# Patient Record
Sex: FEMALE | Race: WHITE | Employment: UNEMPLOYED | URBAN - NONMETROPOLITAN AREA
[De-identification: names, ages, dates, MRNs, and addresses within clinical notes are randomized per-mention and may not be internally consistent; named-entity substitution may affect disease eponyms.]

---

## 2017-01-03 ENCOUNTER — NURSE ONLY (OUTPATIENT)
Dept: FAMILY MEDICINE CLINIC | Age: 42
End: 2017-01-03
Payer: MEDICAID

## 2017-01-03 DIAGNOSIS — E53.8 B12 DEFICIENCY: Primary | ICD-10-CM

## 2017-01-03 PROCEDURE — 96372 THER/PROPH/DIAG INJ SC/IM: CPT | Performed by: FAMILY MEDICINE

## 2017-01-03 RX ORDER — CYANOCOBALAMIN 1000 UG/ML
1000 INJECTION INTRAMUSCULAR; SUBCUTANEOUS ONCE
Status: COMPLETED | OUTPATIENT
Start: 2017-01-03 | End: 2017-01-03

## 2017-01-03 RX ADMIN — CYANOCOBALAMIN 1000 MCG: 1000 INJECTION INTRAMUSCULAR; SUBCUTANEOUS at 09:21

## 2017-01-10 ENCOUNTER — OFFICE VISIT (OUTPATIENT)
Dept: FAMILY MEDICINE CLINIC | Age: 42
End: 2017-01-10
Payer: MEDICAID

## 2017-01-10 VITALS
DIASTOLIC BLOOD PRESSURE: 70 MMHG | SYSTOLIC BLOOD PRESSURE: 108 MMHG | RESPIRATION RATE: 18 BRPM | OXYGEN SATURATION: 98 % | BODY MASS INDEX: 37.89 KG/M2 | HEIGHT: 60 IN | WEIGHT: 193 LBS | HEART RATE: 74 BPM

## 2017-01-10 DIAGNOSIS — M72.2 PLANTAR FASCIITIS, RIGHT: Primary | ICD-10-CM

## 2017-01-10 DIAGNOSIS — B37.2 YEAST DERMATITIS: ICD-10-CM

## 2017-01-10 PROCEDURE — 99214 OFFICE O/P EST MOD 30 MIN: CPT | Performed by: FAMILY MEDICINE

## 2017-01-10 RX ORDER — MONTELUKAST SODIUM 10 MG/1
TABLET ORAL
Qty: 30 TABLET | Refills: 5 | Status: SHIPPED | OUTPATIENT
Start: 2017-01-10 | End: 2017-06-19 | Stop reason: SDUPTHER

## 2017-01-10 RX ORDER — PHENTERMINE HYDROCHLORIDE 37.5 MG/1
37.5 CAPSULE ORAL EVERY MORNING
Qty: 30 CAPSULE | Refills: 2 | Status: SHIPPED | OUTPATIENT
Start: 2017-01-10 | End: 2017-02-09

## 2017-01-10 RX ORDER — LEVOTHYROXINE SODIUM 88 UG/1
88 TABLET ORAL DAILY
Qty: 30 TABLET | Refills: 5 | Status: SHIPPED | OUTPATIENT
Start: 2017-01-10 | End: 2017-06-19 | Stop reason: SDUPTHER

## 2017-01-10 RX ORDER — CETIRIZINE HYDROCHLORIDE 10 MG/1
TABLET ORAL
Qty: 30 TABLET | Refills: 5 | Status: SHIPPED | OUTPATIENT
Start: 2017-01-10 | End: 2017-06-19 | Stop reason: SDUPTHER

## 2017-01-10 RX ORDER — NYSTATIN 100000 U/G
CREAM TOPICAL
Qty: 1 TUBE | Refills: 2 | Status: SHIPPED | OUTPATIENT
Start: 2017-01-10 | End: 2017-04-10 | Stop reason: SDUPTHER

## 2017-01-10 ASSESSMENT — ENCOUNTER SYMPTOMS: VOICE CHANGE: 1

## 2017-03-03 ENCOUNTER — LAB REQUISITION (OUTPATIENT)
Dept: LAB | Facility: HOSPITAL | Age: 42
End: 2017-03-03

## 2017-03-03 DIAGNOSIS — R13.10 DYSPHAGIA: ICD-10-CM

## 2017-03-03 PROCEDURE — 88305 TISSUE EXAM BY PATHOLOGIST: CPT | Performed by: INTERNAL MEDICINE

## 2017-03-08 LAB
CYTO UR: NORMAL
LAB AP CASE REPORT: NORMAL
LAB AP CLINICAL INFORMATION: NORMAL
Lab: NORMAL
PATH REPORT.ADDENDUM SPEC: NORMAL
PATH REPORT.FINAL DX SPEC: NORMAL
PATH REPORT.GROSS SPEC: NORMAL

## 2017-03-13 RX ORDER — ERGOCALCIFEROL 1.25 MG/1
CAPSULE ORAL
Qty: 4 CAPSULE | Refills: 0 | Status: SHIPPED | OUTPATIENT
Start: 2017-03-13 | End: 2017-04-10 | Stop reason: SDUPTHER

## 2017-03-30 RX ORDER — FUROSEMIDE 20 MG/1
TABLET ORAL
Qty: 30 TABLET | Refills: 0 | Status: SHIPPED | OUTPATIENT
Start: 2017-03-30 | End: 2017-04-10 | Stop reason: SDUPTHER

## 2017-04-10 ENCOUNTER — OFFICE VISIT (OUTPATIENT)
Dept: FAMILY MEDICINE CLINIC | Age: 42
End: 2017-04-10
Payer: MEDICAID

## 2017-04-10 VITALS
HEART RATE: 96 BPM | DIASTOLIC BLOOD PRESSURE: 72 MMHG | BODY MASS INDEX: 38.48 KG/M2 | OXYGEN SATURATION: 99 % | SYSTOLIC BLOOD PRESSURE: 118 MMHG | RESPIRATION RATE: 18 BRPM | WEIGHT: 196 LBS | HEIGHT: 60 IN

## 2017-04-10 DIAGNOSIS — M25.561 ACUTE PAIN OF RIGHT KNEE: ICD-10-CM

## 2017-04-10 DIAGNOSIS — M54.42 CHRONIC BILATERAL LOW BACK PAIN WITH BILATERAL SCIATICA: ICD-10-CM

## 2017-04-10 DIAGNOSIS — L03.032 CELLULITIS OF TOE OF LEFT FOOT: Primary | ICD-10-CM

## 2017-04-10 DIAGNOSIS — G89.29 CHRONIC BILATERAL LOW BACK PAIN WITH BILATERAL SCIATICA: ICD-10-CM

## 2017-04-10 DIAGNOSIS — F32.A DEPRESSION, UNSPECIFIED DEPRESSION TYPE: ICD-10-CM

## 2017-04-10 DIAGNOSIS — R10.31 RIGHT LOWER QUADRANT PAIN: ICD-10-CM

## 2017-04-10 DIAGNOSIS — M54.41 CHRONIC BILATERAL LOW BACK PAIN WITH BILATERAL SCIATICA: ICD-10-CM

## 2017-04-10 DIAGNOSIS — E53.8 B12 DEFICIENCY: ICD-10-CM

## 2017-04-10 PROCEDURE — 96372 THER/PROPH/DIAG INJ SC/IM: CPT | Performed by: FAMILY MEDICINE

## 2017-04-10 PROCEDURE — 99214 OFFICE O/P EST MOD 30 MIN: CPT | Performed by: FAMILY MEDICINE

## 2017-04-10 RX ORDER — FUROSEMIDE 20 MG/1
TABLET ORAL
Qty: 30 TABLET | Refills: 2 | Status: SHIPPED | OUTPATIENT
Start: 2017-04-10 | End: 2017-06-19 | Stop reason: SDUPTHER

## 2017-04-10 RX ORDER — CEPHALEXIN 500 MG/1
500 CAPSULE ORAL 3 TIMES DAILY
Qty: 21 CAPSULE | Refills: 0 | Status: SHIPPED | OUTPATIENT
Start: 2017-04-10 | End: 2017-04-17

## 2017-04-10 RX ORDER — FAMOTIDINE 40 MG/1
1 TABLET, FILM COATED ORAL 2 TIMES DAILY
COMMUNITY
Start: 2017-03-29 | End: 2018-03-05 | Stop reason: SDUPTHER

## 2017-04-10 RX ORDER — CYANOCOBALAMIN 1000 UG/ML
1000 INJECTION INTRAMUSCULAR; SUBCUTANEOUS ONCE
Status: COMPLETED | OUTPATIENT
Start: 2017-04-10 | End: 2017-04-10

## 2017-04-10 RX ORDER — PHENTERMINE HYDROCHLORIDE 37.5 MG/1
37.5 CAPSULE ORAL EVERY MORNING
Qty: 30 CAPSULE | Refills: 2 | Status: CANCELLED | OUTPATIENT
Start: 2017-04-10 | End: 2017-05-10

## 2017-04-10 RX ORDER — LEVOCETIRIZINE DIHYDROCHLORIDE 5 MG/1
5 TABLET, FILM COATED ORAL DAILY
Qty: 30 TABLET | Refills: 5 | Status: SHIPPED | OUTPATIENT
Start: 2017-04-10 | End: 2017-12-29 | Stop reason: SDUPTHER

## 2017-04-10 RX ORDER — NAPROXEN 500 MG/1
500 TABLET ORAL 2 TIMES DAILY WITH MEALS
Qty: 60 TABLET | Refills: 5 | Status: SHIPPED | OUTPATIENT
Start: 2017-04-10 | End: 2017-06-19

## 2017-04-10 RX ORDER — FLUTICASONE PROPIONATE 50 MCG
SPRAY, SUSPENSION (ML) NASAL
Qty: 1 BOTTLE | Refills: 5 | Status: SHIPPED | OUTPATIENT
Start: 2017-04-10 | End: 2017-10-20 | Stop reason: SDUPTHER

## 2017-04-10 RX ORDER — ERGOCALCIFEROL 1.25 MG/1
CAPSULE ORAL
Qty: 4 CAPSULE | Refills: 2 | Status: SHIPPED | OUTPATIENT
Start: 2017-04-10 | End: 2017-06-19 | Stop reason: SDUPTHER

## 2017-04-10 RX ORDER — DEXLANSOPRAZOLE 60 MG/1
1 CAPSULE, DELAYED RELEASE ORAL DAILY
COMMUNITY
Start: 2017-04-03 | End: 2018-06-29 | Stop reason: SDUPTHER

## 2017-04-10 RX ORDER — NYSTATIN 100000 U/G
CREAM TOPICAL
Qty: 1 TUBE | Refills: 2 | Status: SHIPPED | OUTPATIENT
Start: 2017-04-10 | End: 2017-06-19 | Stop reason: SDUPTHER

## 2017-04-10 RX ADMIN — CYANOCOBALAMIN 1000 MCG: 1000 INJECTION INTRAMUSCULAR; SUBCUTANEOUS at 15:18

## 2017-04-10 ASSESSMENT — ENCOUNTER SYMPTOMS: VOICE CHANGE: 1

## 2017-04-21 ENCOUNTER — HOSPITAL ENCOUNTER (OUTPATIENT)
Dept: GENERAL RADIOLOGY | Age: 42
Discharge: HOME | End: 2017-04-21
Attending: FAMILY MEDICINE | Admitting: FAMILY MEDICINE

## 2017-04-21 DIAGNOSIS — R10.31 RIGHT LOWER QUADRANT PAIN: ICD-10-CM

## 2017-04-24 ENCOUNTER — OFFICE VISIT (OUTPATIENT)
Dept: FAMILY MEDICINE CLINIC | Age: 42
End: 2017-04-24
Payer: MEDICAID

## 2017-04-24 ENCOUNTER — HOSPITAL ENCOUNTER (OUTPATIENT)
Dept: OTHER | Age: 42
Discharge: HOME | End: 2017-04-24
Attending: FAMILY MEDICINE | Admitting: FAMILY MEDICINE

## 2017-04-24 VITALS
BODY MASS INDEX: 38.68 KG/M2 | DIASTOLIC BLOOD PRESSURE: 76 MMHG | SYSTOLIC BLOOD PRESSURE: 120 MMHG | HEART RATE: 73 BPM | HEIGHT: 60 IN | RESPIRATION RATE: 18 BRPM | OXYGEN SATURATION: 98 % | WEIGHT: 197 LBS

## 2017-04-24 DIAGNOSIS — R53.83 FATIGUE, UNSPECIFIED TYPE: ICD-10-CM

## 2017-04-24 DIAGNOSIS — E55.9 VITAMIN D DEFICIENCY: ICD-10-CM

## 2017-04-24 DIAGNOSIS — K21.9 GASTROESOPHAGEAL REFLUX DISEASE WITHOUT ESOPHAGITIS: ICD-10-CM

## 2017-04-24 DIAGNOSIS — R10.9 RIGHT LATERAL ABDOMINAL PAIN: Primary | ICD-10-CM

## 2017-04-24 DIAGNOSIS — K60.2 ANAL FISSURE: ICD-10-CM

## 2017-04-24 DIAGNOSIS — R63.5 WEIGHT GAIN: ICD-10-CM

## 2017-04-24 PROCEDURE — 99214 OFFICE O/P EST MOD 30 MIN: CPT | Performed by: FAMILY MEDICINE

## 2017-04-24 RX ORDER — PHENTERMINE HYDROCHLORIDE 37.5 MG/1
37.5 CAPSULE ORAL EVERY MORNING
Qty: 30 CAPSULE | Refills: 1 | Status: SHIPPED | OUTPATIENT
Start: 2017-04-24 | End: 2017-05-24

## 2017-04-24 ASSESSMENT — ENCOUNTER SYMPTOMS: VOICE CHANGE: 1

## 2017-04-25 LAB
A/G RATIO: 2 (ref 0.8–2)
ALBUMIN SERPL-MCNC: 4.5 G/DL (ref 3.4–4.8)
ALP BLD-CCNC: 95 U/L (ref 25–100)
ALT SERPL-CCNC: 8 U/L (ref 4–36)
ANION GAP SERPL CALCULATED.3IONS-SCNC: 10 MMOL/L (ref 3–16)
AST SERPL-CCNC: 11 U/L (ref 8–33)
BASOPHILS ABSOLUTE: 0 K/UL (ref 0–0.1)
BASOPHILS RELATIVE PERCENT: 0.4 %
BILIRUB SERPL-MCNC: <0.2 MG/DL (ref 0.3–1.2)
BUN BLDV-MCNC: 14 MG/DL (ref 6–20)
CALCIUM SERPL-MCNC: 9.3 MG/DL (ref 8.5–10.5)
CHLORIDE BLD-SCNC: 103 MMOL/L (ref 98–107)
CO2: 29 MMOL/L (ref 20–30)
CREAT SERPL-MCNC: 0.9 MG/DL (ref 0.4–1.2)
EOSINOPHILS ABSOLUTE: 0.2 K/UL (ref 0–0.4)
EOSINOPHILS RELATIVE PERCENT: 2.2 %
GFR AFRICAN AMERICAN: >59
GFR NON-AFRICAN AMERICAN: >60
GLOBULIN: 2.3 G/DL
GLUCOSE BLD-MCNC: 83 MG/DL (ref 74–106)
HCT VFR BLD CALC: 38.3 % (ref 37–47)
HEMOGLOBIN: 13.1 G/DL (ref 11.5–16.5)
LYMPHOCYTES ABSOLUTE: 3.2 K/UL (ref 1.5–4)
LYMPHOCYTES RELATIVE PERCENT: 34.8 % (ref 20–40)
MCH RBC QN AUTO: 30.7 PG (ref 27–32)
MCHC RBC AUTO-ENTMCNC: 34.2 G/DL (ref 31–35)
MCV RBC AUTO: 89.7 FL (ref 80–100)
MONOCYTES ABSOLUTE: 0.6 K/UL (ref 0.2–0.8)
MONOCYTES RELATIVE PERCENT: 6.7 % (ref 3–10)
NEUTROPHILS ABSOLUTE: 5 K/UL (ref 2–7.5)
NEUTROPHILS RELATIVE PERCENT: 55.9 %
PDW BLD-RTO: 13.7 % (ref 11–16)
PLATELET # BLD: 328 K/UL (ref 150–400)
PMV BLD AUTO: 10.9 FL (ref 6–10)
POTASSIUM SERPL-SCNC: 4 MMOL/L (ref 3.4–5.1)
RBC # BLD: 4.27 M/UL (ref 3.8–5.8)
SODIUM BLD-SCNC: 142 MMOL/L (ref 136–145)
T4 FREE: 1.74 NG/DL (ref 0.89–1.76)
TOTAL PROTEIN: 6.8 G/DL (ref 6.4–8.3)
TSH SERPL DL<=0.05 MIU/L-ACNC: 1.67 UIU/ML (ref 0.35–5.5)
VITAMIN D 25-HYDROXY: 21.1 (ref 32–100)
WBC # BLD: 9 K/UL (ref 4–11)

## 2017-05-02 ENCOUNTER — HOSPITAL ENCOUNTER (OUTPATIENT)
Dept: OTHER | Age: 42
Discharge: HOME | End: 2017-05-02
Attending: FAMILY MEDICINE | Admitting: FAMILY MEDICINE

## 2017-05-02 LAB
A/G RATIO: 1.7 (ref 0.8–2)
ALBUMIN SERPL-MCNC: 4.2 G/DL (ref 3.4–4.8)
ALP BLD-CCNC: 96 U/L (ref 25–100)
ALT SERPL-CCNC: 7 U/L (ref 4–36)
ANION GAP SERPL CALCULATED.3IONS-SCNC: 10 MMOL/L (ref 3–16)
APTT: 25.7 SEC (ref 23.2–28.2)
AST SERPL-CCNC: 11 U/L (ref 8–33)
BILIRUB SERPL-MCNC: 0.4 MG/DL (ref 0.3–1.2)
BUN BLDV-MCNC: 18 MG/DL (ref 6–20)
CALCIUM SERPL-MCNC: 9.5 MG/DL (ref 8.5–10.5)
CHLORIDE BLD-SCNC: 102 MMOL/L (ref 98–107)
CO2: 28 MMOL/L (ref 20–30)
CREAT SERPL-MCNC: 0.8 MG/DL (ref 0.4–1.2)
GFR AFRICAN AMERICAN: >59
GFR NON-AFRICAN AMERICAN: >60
GLOBULIN: 2.5 G/DL
GLUCOSE BLD-MCNC: 105 MG/DL (ref 74–106)
HCT VFR BLD CALC: 38.2 % (ref 37–47)
HEMOGLOBIN: 13 G/DL (ref 11.5–16.5)
INR BLD: 1 (ref 0.85–1.15)
MCH RBC QN AUTO: 30.4 PG (ref 27–32)
MCHC RBC AUTO-ENTMCNC: 34 G/DL (ref 31–35)
MCV RBC AUTO: 89.5 FL (ref 80–100)
PDW BLD-RTO: 13.6 % (ref 11–16)
PLATELET # BLD: 275 K/UL (ref 150–400)
PMV BLD AUTO: 10.4 FL (ref 6–10)
POTASSIUM SERPL-SCNC: 4.2 MMOL/L (ref 3.4–5.1)
PROTHROMBIN TIME: 10.1 SEC (ref 9.8–11.5)
RBC # BLD: 4.27 M/UL (ref 3.8–5.8)
SODIUM BLD-SCNC: 140 MMOL/L (ref 136–145)
TOTAL PROTEIN: 6.7 G/DL (ref 6.4–8.3)
WBC # BLD: 7.9 K/UL (ref 4–11)

## 2017-05-03 ENCOUNTER — TELEPHONE (OUTPATIENT)
Dept: FAMILY MEDICINE CLINIC | Age: 42
End: 2017-05-03

## 2017-05-15 RX ORDER — CLONAZEPAM 0.5 MG/1
TABLET ORAL
Qty: 45 TABLET | Refills: 0 | Status: SHIPPED | OUTPATIENT
Start: 2017-05-15 | End: 2017-06-13 | Stop reason: SDUPTHER

## 2017-05-15 RX ORDER — BUPROPION HYDROCHLORIDE 300 MG/1
300 TABLET ORAL EVERY MORNING
Qty: 30 TABLET | Refills: 0 | Status: SHIPPED | OUTPATIENT
Start: 2017-05-15 | End: 2017-06-13 | Stop reason: SDUPTHER

## 2017-06-14 RX ORDER — CLONAZEPAM 0.5 MG/1
TABLET ORAL
Qty: 45 TABLET | Refills: 0 | Status: SHIPPED | OUTPATIENT
Start: 2017-06-14 | End: 2017-06-19 | Stop reason: SDUPTHER

## 2017-06-14 RX ORDER — BUPROPION HYDROCHLORIDE 300 MG/1
TABLET ORAL
Qty: 30 TABLET | Refills: 0 | Status: SHIPPED | OUTPATIENT
Start: 2017-06-14 | End: 2017-06-19 | Stop reason: SDUPTHER

## 2017-06-19 ENCOUNTER — OFFICE VISIT (OUTPATIENT)
Dept: FAMILY MEDICINE CLINIC | Age: 42
End: 2017-06-19
Payer: MEDICAID

## 2017-06-19 VITALS
WEIGHT: 197 LBS | BODY MASS INDEX: 37.19 KG/M2 | DIASTOLIC BLOOD PRESSURE: 74 MMHG | HEART RATE: 78 BPM | SYSTOLIC BLOOD PRESSURE: 110 MMHG | RESPIRATION RATE: 18 BRPM | HEIGHT: 61 IN | OXYGEN SATURATION: 98 %

## 2017-06-19 DIAGNOSIS — R34 DECREASED URINE OUTPUT: Primary | ICD-10-CM

## 2017-06-19 DIAGNOSIS — R91.1 LUNG NODULE: ICD-10-CM

## 2017-06-19 DIAGNOSIS — E53.8 B12 DEFICIENCY: ICD-10-CM

## 2017-06-19 PROCEDURE — 99214 OFFICE O/P EST MOD 30 MIN: CPT | Performed by: FAMILY MEDICINE

## 2017-06-19 RX ORDER — FUROSEMIDE 20 MG/1
TABLET ORAL
Qty: 30 TABLET | Refills: 5 | Status: SHIPPED | OUTPATIENT
Start: 2017-06-19 | End: 2018-01-12 | Stop reason: SDUPTHER

## 2017-06-19 RX ORDER — PHENTERMINE HYDROCHLORIDE 37.5 MG/1
37.5 CAPSULE ORAL EVERY MORNING
Qty: 30 CAPSULE | Refills: 0 | Status: SHIPPED | OUTPATIENT
Start: 2017-06-19 | End: 2017-07-17 | Stop reason: SDUPTHER

## 2017-06-19 RX ORDER — CETIRIZINE HYDROCHLORIDE 10 MG/1
TABLET ORAL
Qty: 30 TABLET | Refills: 5 | Status: SHIPPED | OUTPATIENT
Start: 2017-06-19 | End: 2018-01-17 | Stop reason: SDUPTHER

## 2017-06-19 RX ORDER — NYSTATIN 100000 U/G
CREAM TOPICAL
Qty: 1 TUBE | Refills: 2 | Status: SHIPPED | OUTPATIENT
Start: 2017-06-19 | End: 2018-01-17

## 2017-06-19 RX ORDER — TERBINAFINE HYDROCHLORIDE 250 MG/1
250 TABLET ORAL DAILY
COMMUNITY
End: 2018-01-17 | Stop reason: SDUPTHER

## 2017-06-19 RX ORDER — LEVOTHYROXINE SODIUM 88 UG/1
88 TABLET ORAL DAILY
Qty: 30 TABLET | Refills: 5 | Status: SHIPPED | OUTPATIENT
Start: 2017-06-19 | End: 2017-12-18 | Stop reason: SDUPTHER

## 2017-06-19 RX ORDER — MELOXICAM 15 MG/1
15 TABLET ORAL DAILY
Qty: 30 TABLET | Refills: 2 | Status: SHIPPED | OUTPATIENT
Start: 2017-06-19 | End: 2017-06-22 | Stop reason: SINTOL

## 2017-06-19 RX ORDER — PHENTERMINE HYDROCHLORIDE 37.5 MG/1
37.5 CAPSULE ORAL EVERY MORNING
Qty: 30 CAPSULE | Refills: 1 | Status: CANCELLED | OUTPATIENT
Start: 2017-06-19 | End: 2017-07-19

## 2017-06-19 RX ORDER — ERGOCALCIFEROL 1.25 MG/1
CAPSULE ORAL
Qty: 4 CAPSULE | Refills: 2 | Status: SHIPPED | OUTPATIENT
Start: 2017-06-19 | End: 2017-10-20 | Stop reason: SDUPTHER

## 2017-06-19 RX ORDER — OXYBUTYNIN CHLORIDE 10 MG/1
TABLET, EXTENDED RELEASE ORAL
Qty: 30 TABLET | Refills: 5 | Status: SHIPPED | OUTPATIENT
Start: 2017-06-19 | End: 2017-12-18 | Stop reason: SDUPTHER

## 2017-06-19 RX ORDER — BUDESONIDE AND FORMOTEROL FUMARATE DIHYDRATE 160; 4.5 UG/1; UG/1
AEROSOL RESPIRATORY (INHALATION)
Qty: 10.2 G | Refills: 5 | Status: SHIPPED | OUTPATIENT
Start: 2017-06-19 | End: 2017-12-18 | Stop reason: SDUPTHER

## 2017-06-19 RX ORDER — CYANOCOBALAMIN 1000 UG/ML
1000 INJECTION INTRAMUSCULAR; SUBCUTANEOUS ONCE
Status: COMPLETED | OUTPATIENT
Start: 2017-06-19 | End: 2017-06-19

## 2017-06-19 RX ORDER — PRENATAL VIT 91/IRON/FOLIC/DHA 28-975-200
COMBINATION PACKAGE (EA) ORAL 2 TIMES DAILY
COMMUNITY
End: 2018-06-29 | Stop reason: ALTCHOICE

## 2017-06-19 RX ORDER — BUPROPION HYDROCHLORIDE 300 MG/1
TABLET ORAL
Qty: 30 TABLET | Refills: 0 | Status: SHIPPED | OUTPATIENT
Start: 2017-06-19 | End: 2017-07-17 | Stop reason: SDUPTHER

## 2017-06-19 RX ORDER — MONTELUKAST SODIUM 10 MG/1
TABLET ORAL
Qty: 30 TABLET | Refills: 5 | Status: SHIPPED | OUTPATIENT
Start: 2017-06-19 | End: 2017-12-18 | Stop reason: SDUPTHER

## 2017-06-19 RX ORDER — CLONAZEPAM 0.5 MG/1
TABLET ORAL
Qty: 45 TABLET | Refills: 0 | Status: SHIPPED | OUTPATIENT
Start: 2017-06-19 | End: 2017-07-17 | Stop reason: SDUPTHER

## 2017-06-19 RX ADMIN — CYANOCOBALAMIN 1000 MCG: 1000 INJECTION INTRAMUSCULAR; SUBCUTANEOUS at 17:05

## 2017-06-19 ASSESSMENT — PATIENT HEALTH QUESTIONNAIRE - PHQ9
1. LITTLE INTEREST OR PLEASURE IN DOING THINGS: 0
2. FEELING DOWN, DEPRESSED OR HOPELESS: 0
SUM OF ALL RESPONSES TO PHQ9 QUESTIONS 1 & 2: 0
SUM OF ALL RESPONSES TO PHQ QUESTIONS 1-9: 0

## 2017-06-19 ASSESSMENT — ENCOUNTER SYMPTOMS: VOICE CHANGE: 1

## 2017-06-29 ENCOUNTER — HOSPITAL ENCOUNTER (OUTPATIENT)
Dept: OTHER | Age: 42
Discharge: HOME | End: 2017-06-29
Attending: FAMILY MEDICINE | Admitting: FAMILY MEDICINE

## 2017-06-29 DIAGNOSIS — R91.1 LUNG NODULE: ICD-10-CM

## 2017-07-17 ENCOUNTER — HOSPITAL ENCOUNTER (OUTPATIENT)
Dept: OTHER | Age: 42
Discharge: HOME | End: 2017-07-17
Attending: FAMILY MEDICINE | Admitting: FAMILY MEDICINE

## 2017-07-17 ENCOUNTER — OFFICE VISIT (OUTPATIENT)
Dept: FAMILY MEDICINE CLINIC | Age: 42
End: 2017-07-17
Payer: MEDICAID

## 2017-07-17 VITALS
HEART RATE: 86 BPM | OXYGEN SATURATION: 99 % | HEIGHT: 61 IN | BODY MASS INDEX: 37 KG/M2 | DIASTOLIC BLOOD PRESSURE: 76 MMHG | RESPIRATION RATE: 18 BRPM | WEIGHT: 196 LBS | SYSTOLIC BLOOD PRESSURE: 108 MMHG

## 2017-07-17 DIAGNOSIS — F32.A DEPRESSION, UNSPECIFIED DEPRESSION TYPE: ICD-10-CM

## 2017-07-17 DIAGNOSIS — E53.8 B12 DEFICIENCY: Primary | ICD-10-CM

## 2017-07-17 DIAGNOSIS — G89.29 CHRONIC BILATERAL LOW BACK PAIN WITH BILATERAL SCIATICA: ICD-10-CM

## 2017-07-17 DIAGNOSIS — E55.9 VITAMIN D DEFICIENCY: ICD-10-CM

## 2017-07-17 DIAGNOSIS — M54.41 CHRONIC BILATERAL LOW BACK PAIN WITH BILATERAL SCIATICA: ICD-10-CM

## 2017-07-17 DIAGNOSIS — M54.42 CHRONIC BILATERAL LOW BACK PAIN WITH BILATERAL SCIATICA: ICD-10-CM

## 2017-07-17 DIAGNOSIS — R22.32 AXILLARY MASS, LEFT: ICD-10-CM

## 2017-07-17 DIAGNOSIS — R53.83 FATIGUE, UNSPECIFIED TYPE: ICD-10-CM

## 2017-07-17 DIAGNOSIS — L30.9 ECZEMA, UNSPECIFIED TYPE: ICD-10-CM

## 2017-07-17 DIAGNOSIS — E03.9 HYPOTHYROIDISM, UNSPECIFIED TYPE: ICD-10-CM

## 2017-07-17 DIAGNOSIS — R73.9 ELEVATED BLOOD SUGAR: ICD-10-CM

## 2017-07-17 PROCEDURE — 99214 OFFICE O/P EST MOD 30 MIN: CPT | Performed by: FAMILY MEDICINE

## 2017-07-17 RX ORDER — CLONAZEPAM 0.5 MG/1
TABLET ORAL
Qty: 45 TABLET | Refills: 2 | Status: SHIPPED | OUTPATIENT
Start: 2017-07-17 | End: 2017-10-17 | Stop reason: SDUPTHER

## 2017-07-17 RX ORDER — BUPROPION HYDROCHLORIDE 300 MG/1
TABLET ORAL
Qty: 30 TABLET | Refills: 5 | Status: SHIPPED | OUTPATIENT
Start: 2017-07-17 | End: 2018-01-17 | Stop reason: SDUPTHER

## 2017-07-17 RX ORDER — PHENTERMINE HYDROCHLORIDE 37.5 MG/1
37.5 CAPSULE ORAL EVERY MORNING
Qty: 30 CAPSULE | Refills: 2 | Status: SHIPPED | OUTPATIENT
Start: 2017-07-17 | End: 2017-10-17 | Stop reason: SDUPTHER

## 2017-07-17 RX ORDER — CYANOCOBALAMIN 1000 UG/ML
1000 INJECTION INTRAMUSCULAR; SUBCUTANEOUS ONCE
Status: COMPLETED | OUTPATIENT
Start: 2017-07-17 | End: 2017-07-17

## 2017-07-17 RX ADMIN — CYANOCOBALAMIN 1000 MCG: 1000 INJECTION INTRAMUSCULAR; SUBCUTANEOUS at 17:02

## 2017-07-18 ENCOUNTER — HOSPITAL ENCOUNTER (OUTPATIENT)
Dept: OTHER | Age: 42
Discharge: HOME | End: 2017-07-18
Attending: FAMILY MEDICINE | Admitting: FAMILY MEDICINE

## 2017-07-18 DIAGNOSIS — R73.9 ELEVATED BLOOD SUGAR: ICD-10-CM

## 2017-07-18 DIAGNOSIS — E55.9 VITAMIN D DEFICIENCY: ICD-10-CM

## 2017-07-18 DIAGNOSIS — E03.9 HYPOTHYROIDISM, UNSPECIFIED TYPE: ICD-10-CM

## 2017-07-18 DIAGNOSIS — E53.8 B12 DEFICIENCY: ICD-10-CM

## 2017-07-18 LAB
FOLATE: 9.9 NG/ML
HBA1C MFR BLD: 5.5 %
T4 FREE: 1.71 NG/DL (ref 0.89–1.76)
TSH SERPL DL<=0.05 MIU/L-ACNC: 0.51 UIU/ML (ref 0.35–5.5)
VITAMIN B-12: >2000 PG/ML (ref 211–911)
VITAMIN D 25-HYDROXY: 20.1 (ref 32–100)

## 2017-07-18 ASSESSMENT — ENCOUNTER SYMPTOMS: VOICE CHANGE: 1

## 2017-08-02 ENCOUNTER — TELEPHONE (OUTPATIENT)
Dept: FAMILY MEDICINE CLINIC | Age: 42
End: 2017-08-02

## 2017-09-25 ENCOUNTER — NURSE ONLY (OUTPATIENT)
Dept: FAMILY MEDICINE CLINIC | Age: 42
End: 2017-09-25
Payer: MEDICAID

## 2017-09-25 DIAGNOSIS — E53.8 B12 DEFICIENCY: ICD-10-CM

## 2017-09-25 DIAGNOSIS — J30.89 ALLERGIC RHINITIS DUE TO OTHER ALLERGIC TRIGGER, UNSPECIFIED RHINITIS SEASONALITY: Primary | ICD-10-CM

## 2017-09-25 PROCEDURE — 96372 THER/PROPH/DIAG INJ SC/IM: CPT | Performed by: FAMILY MEDICINE

## 2017-09-25 PROCEDURE — 95117 IMMUNOTHERAPY INJECTIONS: CPT | Performed by: FAMILY MEDICINE

## 2017-09-25 RX ORDER — CYANOCOBALAMIN 1000 UG/ML
1000 INJECTION INTRAMUSCULAR; SUBCUTANEOUS ONCE
Status: COMPLETED | OUTPATIENT
Start: 2017-09-25 | End: 2017-09-25

## 2017-09-25 RX ADMIN — CYANOCOBALAMIN 1000 MCG: 1000 INJECTION INTRAMUSCULAR; SUBCUTANEOUS at 10:56

## 2017-10-09 ENCOUNTER — NURSE ONLY (OUTPATIENT)
Dept: FAMILY MEDICINE CLINIC | Age: 42
End: 2017-10-09
Payer: MEDICAID

## 2017-10-09 DIAGNOSIS — J30.2 SEASONAL ALLERGIC RHINITIS, UNSPECIFIED ALLERGIC RHINITIS TRIGGER: Primary | ICD-10-CM

## 2017-10-09 PROCEDURE — 95115 IMMUNOTHERAPY ONE INJECTION: CPT | Performed by: FAMILY MEDICINE

## 2017-10-17 ENCOUNTER — HOSPITAL ENCOUNTER (OUTPATIENT)
Dept: OTHER | Age: 42
Discharge: HOME | End: 2017-10-17
Attending: FAMILY MEDICINE | Admitting: FAMILY MEDICINE

## 2017-10-17 ENCOUNTER — OFFICE VISIT (OUTPATIENT)
Dept: FAMILY MEDICINE CLINIC | Age: 42
End: 2017-10-17
Payer: MEDICAID

## 2017-10-17 VITALS
SYSTOLIC BLOOD PRESSURE: 120 MMHG | HEIGHT: 61 IN | DIASTOLIC BLOOD PRESSURE: 68 MMHG | BODY MASS INDEX: 37.19 KG/M2 | HEART RATE: 82 BPM | RESPIRATION RATE: 18 BRPM | OXYGEN SATURATION: 97 % | WEIGHT: 197 LBS

## 2017-10-17 DIAGNOSIS — F32.A DEPRESSION, UNSPECIFIED DEPRESSION TYPE: ICD-10-CM

## 2017-10-17 DIAGNOSIS — E53.8 B12 DEFICIENCY: ICD-10-CM

## 2017-10-17 DIAGNOSIS — E66.9 OBESITY, UNSPECIFIED CLASSIFICATION, UNSPECIFIED OBESITY TYPE, UNSPECIFIED WHETHER SERIOUS COMORBIDITY PRESENT: ICD-10-CM

## 2017-10-17 DIAGNOSIS — G89.29 CHRONIC BILATERAL LOW BACK PAIN WITH BILATERAL SCIATICA: ICD-10-CM

## 2017-10-17 DIAGNOSIS — J30.2 SEASONAL ALLERGIC RHINITIS, UNSPECIFIED CHRONICITY, UNSPECIFIED TRIGGER: ICD-10-CM

## 2017-10-17 DIAGNOSIS — M54.41 CHRONIC BILATERAL LOW BACK PAIN WITH BILATERAL SCIATICA: ICD-10-CM

## 2017-10-17 DIAGNOSIS — E03.9 HYPOTHYROIDISM, UNSPECIFIED TYPE: ICD-10-CM

## 2017-10-17 DIAGNOSIS — M54.42 CHRONIC BILATERAL LOW BACK PAIN WITH BILATERAL SCIATICA: ICD-10-CM

## 2017-10-17 DIAGNOSIS — E03.9 HYPOTHYROIDISM, UNSPECIFIED TYPE: Primary | ICD-10-CM

## 2017-10-17 DIAGNOSIS — R49.0 HOARSENESS: ICD-10-CM

## 2017-10-17 PROCEDURE — 95115 IMMUNOTHERAPY ONE INJECTION: CPT | Performed by: FAMILY MEDICINE

## 2017-10-17 PROCEDURE — 99214 OFFICE O/P EST MOD 30 MIN: CPT | Performed by: FAMILY MEDICINE

## 2017-10-17 RX ORDER — CLONAZEPAM 0.5 MG/1
TABLET ORAL
Qty: 45 TABLET | Refills: 2 | Status: SHIPPED | OUTPATIENT
Start: 2017-10-17 | End: 2018-01-17 | Stop reason: SDUPTHER

## 2017-10-17 RX ORDER — PHENTERMINE HYDROCHLORIDE 37.5 MG/1
37.5 CAPSULE ORAL EVERY MORNING
Qty: 30 CAPSULE | Refills: 2 | Status: SHIPPED | OUTPATIENT
Start: 2017-10-17 | End: 2018-01-12 | Stop reason: SDUPTHER

## 2017-10-17 ASSESSMENT — ENCOUNTER SYMPTOMS: VOICE CHANGE: 1

## 2017-10-17 NOTE — PROGRESS NOTES
Have you seen any other physician or provider since your last visit yes - Pain management and Podiatrist    Have you had any other diagnostic tests since your last visit? no    Have you changed or stopped any medications since your last visit? no

## 2017-10-17 NOTE — PROGRESS NOTES
SUBJECTIVE:    Patient ID: Hussain Patten is a 38 y.o. female. Chief Complaint   Patient presents with    Hypothyroidism     follow up       HPI:    Review of Systems   HENT: Positive for voice change. Musculoskeletal: Positive for gait problem. Psychiatric/Behavioral: The patient is nervous/anxious. All other systems reviewed and are negative. OBJECTIVE:  Wt Readings from Last 3 Encounters:   10/17/17 197 lb (89.4 kg)   07/17/17 196 lb (88.9 kg)   06/19/17 197 lb (89.4 kg)     BP Readings from Last 3 Encounters:   10/17/17 120/68   07/17/17 108/76   06/19/17 110/74      Pulse Readings from Last 3 Encounters:   10/17/17 82   07/17/17 86   06/19/17 78     Body mass index is 37.22 kg/m². Resp Readings from Last 3 Encounters:   10/17/17 18   07/17/17 18   06/19/17 18     Physical Exam   Constitutional: She is oriented to person, place, and time. Vital signs are normal. She appears well-developed and well-nourished. HENT:   Head: Normocephalic and atraumatic. Right Ear: Hearing and external ear normal.   Left Ear: Hearing and external ear normal.   Nose: Nose normal.   Mouth/Throat: Uvula is midline, oropharynx is clear and moist and mucous membranes are normal.   Eyes: Conjunctivae, EOM and lids are normal. Pupils are equal, round, and reactive to light. Neck: Trachea normal, normal range of motion and phonation normal. Neck supple. Carotid bruit is not present. No thyroid mass and no thyromegaly present. Cardiovascular: Normal rate, regular rhythm, S1 normal, S2 normal, normal heart sounds, intact distal pulses and normal pulses. Exam reveals no gallop and no friction rub. No murmur heard. Pulmonary/Chest: Effort normal and breath sounds normal.   Abdominal: Normal appearance. Musculoskeletal:        Right knee: She exhibits decreased range of motion. Tenderness found. Medial joint line tenderness noted.         Lumbar back: She exhibits decreased range of motion, tenderness, pain

## 2017-10-18 LAB
FOLATE: 17.1 NG/ML
T4 FREE: 1.86 NG/DL (ref 0.89–1.76)
TSH SERPL DL<=0.05 MIU/L-ACNC: 0.65 UIU/ML (ref 0.35–5.5)
VITAMIN B-12: >2000 PG/ML (ref 211–911)

## 2017-10-18 RX ORDER — CYANOCOBALAMIN 1000 UG/ML
1000 INJECTION INTRAMUSCULAR; SUBCUTANEOUS ONCE
Status: COMPLETED | OUTPATIENT
Start: 2017-10-18 | End: 2017-10-18

## 2017-10-18 RX ADMIN — CYANOCOBALAMIN 1000 MCG: 1000 INJECTION INTRAMUSCULAR; SUBCUTANEOUS at 09:13

## 2017-10-20 ENCOUNTER — NURSE ONLY (OUTPATIENT)
Dept: FAMILY MEDICINE CLINIC | Age: 42
End: 2017-10-20
Payer: MEDICAID

## 2017-10-20 DIAGNOSIS — J30.2 SEASONAL ALLERGIC RHINITIS, UNSPECIFIED CHRONICITY, UNSPECIFIED TRIGGER: ICD-10-CM

## 2017-10-20 PROCEDURE — 95115 IMMUNOTHERAPY ONE INJECTION: CPT | Performed by: FAMILY MEDICINE

## 2017-10-24 RX ORDER — FLUTICASONE PROPIONATE 50 MCG
SPRAY, SUSPENSION (ML) NASAL
Qty: 16 G | Refills: 3 | Status: SHIPPED | OUTPATIENT
Start: 2017-10-24 | End: 2018-02-14 | Stop reason: SDUPTHER

## 2017-10-24 RX ORDER — ERGOCALCIFEROL 1.25 MG/1
CAPSULE ORAL
Qty: 4 CAPSULE | Refills: 2 | Status: SHIPPED | OUTPATIENT
Start: 2017-10-24 | End: 2018-01-12 | Stop reason: SDUPTHER

## 2017-11-17 ENCOUNTER — NURSE ONLY (OUTPATIENT)
Dept: FAMILY MEDICINE CLINIC | Age: 42
End: 2017-11-17
Payer: MEDICAID

## 2017-11-17 DIAGNOSIS — J30.2 SEASONAL ALLERGIC RHINITIS, UNSPECIFIED CHRONICITY, UNSPECIFIED TRIGGER: Primary | ICD-10-CM

## 2017-11-17 PROCEDURE — 95115 IMMUNOTHERAPY ONE INJECTION: CPT | Performed by: FAMILY MEDICINE

## 2017-11-22 ENCOUNTER — NURSE ONLY (OUTPATIENT)
Dept: FAMILY MEDICINE CLINIC | Age: 42
End: 2017-11-22
Payer: MEDICAID

## 2017-11-22 DIAGNOSIS — J30.2 SEASONAL ALLERGIC RHINITIS, UNSPECIFIED CHRONICITY, UNSPECIFIED TRIGGER: Primary | ICD-10-CM

## 2017-11-22 PROCEDURE — 95115 IMMUNOTHERAPY ONE INJECTION: CPT | Performed by: FAMILY MEDICINE

## 2017-11-22 NOTE — PROGRESS NOTES
Jayson Kelly was given allergy injection per written order  #1 0.35 ml SC right arm  No complications or reactions noted. Patient tolerated procedure well.

## 2017-11-28 ENCOUNTER — NURSE ONLY (OUTPATIENT)
Dept: FAMILY MEDICINE CLINIC | Age: 42
End: 2017-11-28

## 2017-11-28 DIAGNOSIS — J30.2 SEASONAL ALLERGIC RHINITIS, UNSPECIFIED CHRONICITY, UNSPECIFIED TRIGGER: Primary | ICD-10-CM

## 2017-11-28 NOTE — PROGRESS NOTES
Sandrine Fish was given allergy injection per written order  #1 0.35 ml SC Left arm  No complications or reactions noted. Patient tolerated procedure well.

## 2017-12-01 ENCOUNTER — NURSE ONLY (OUTPATIENT)
Dept: FAMILY MEDICINE CLINIC | Age: 42
End: 2017-12-01
Payer: MEDICAID

## 2017-12-01 DIAGNOSIS — J30.2 SEASONAL ALLERGIC RHINITIS, UNSPECIFIED CHRONICITY, UNSPECIFIED TRIGGER: Primary | ICD-10-CM

## 2017-12-01 DIAGNOSIS — E53.8 B12 DEFICIENCY: ICD-10-CM

## 2017-12-01 PROCEDURE — 96372 THER/PROPH/DIAG INJ SC/IM: CPT | Performed by: FAMILY MEDICINE

## 2017-12-01 RX ORDER — CYANOCOBALAMIN 1000 UG/ML
1000 INJECTION INTRAMUSCULAR; SUBCUTANEOUS ONCE
Status: COMPLETED | OUTPATIENT
Start: 2017-12-01 | End: 2017-12-01

## 2017-12-01 RX ADMIN — CYANOCOBALAMIN 1000 MCG: 1000 INJECTION INTRAMUSCULAR; SUBCUTANEOUS at 13:19

## 2017-12-05 ENCOUNTER — NURSE ONLY (OUTPATIENT)
Dept: FAMILY MEDICINE CLINIC | Age: 42
End: 2017-12-05
Payer: MEDICAID

## 2017-12-05 DIAGNOSIS — J30.9 ALLERGIC RHINITIS, UNSPECIFIED CHRONICITY, UNSPECIFIED SEASONALITY, UNSPECIFIED TRIGGER: Primary | ICD-10-CM

## 2017-12-05 PROCEDURE — 95115 IMMUNOTHERAPY ONE INJECTION: CPT | Performed by: FAMILY MEDICINE

## 2017-12-08 ENCOUNTER — NURSE ONLY (OUTPATIENT)
Dept: FAMILY MEDICINE CLINIC | Age: 42
End: 2017-12-08
Payer: MEDICAID

## 2017-12-08 DIAGNOSIS — J30.2 SEASONAL ALLERGIC RHINITIS, UNSPECIFIED CHRONICITY, UNSPECIFIED TRIGGER: Primary | ICD-10-CM

## 2017-12-08 PROCEDURE — 95115 IMMUNOTHERAPY ONE INJECTION: CPT | Performed by: FAMILY MEDICINE

## 2017-12-15 ENCOUNTER — NURSE ONLY (OUTPATIENT)
Dept: FAMILY MEDICINE CLINIC | Age: 42
End: 2017-12-15

## 2017-12-15 DIAGNOSIS — J30.2 SEASONAL ALLERGIC RHINITIS, UNSPECIFIED CHRONICITY, UNSPECIFIED TRIGGER: Primary | ICD-10-CM

## 2017-12-15 NOTE — PROGRESS NOTES
Jerson Navarro was given allergy injection per written order  #1 .30 ml SC right arm  No complications or reactions noted. Patient tolerated procedure well.

## 2017-12-21 RX ORDER — BUDESONIDE AND FORMOTEROL FUMARATE DIHYDRATE 160; 4.5 UG/1; UG/1
AEROSOL RESPIRATORY (INHALATION)
Qty: 1 INHALER | Refills: 3 | Status: SHIPPED | OUTPATIENT
Start: 2017-12-21 | End: 2018-04-11 | Stop reason: SDUPTHER

## 2017-12-21 RX ORDER — LEVOTHYROXINE SODIUM 88 UG/1
TABLET ORAL
Qty: 30 TABLET | Refills: 3 | Status: SHIPPED | OUTPATIENT
Start: 2017-12-21 | End: 2018-04-11 | Stop reason: SDUPTHER

## 2017-12-21 RX ORDER — MONTELUKAST SODIUM 10 MG/1
TABLET ORAL
Qty: 30 TABLET | Refills: 0 | Status: SHIPPED | OUTPATIENT
Start: 2017-12-21 | End: 2018-01-17 | Stop reason: SDUPTHER

## 2017-12-21 RX ORDER — OXYBUTYNIN CHLORIDE 10 MG/1
TABLET, EXTENDED RELEASE ORAL
Qty: 30 TABLET | Refills: 3 | Status: SHIPPED | OUTPATIENT
Start: 2017-12-21 | End: 2018-04-11 | Stop reason: SDUPTHER

## 2017-12-26 ENCOUNTER — NURSE ONLY (OUTPATIENT)
Dept: FAMILY MEDICINE CLINIC | Age: 42
End: 2017-12-26
Payer: MEDICAID

## 2017-12-26 DIAGNOSIS — J30.2 SEASONAL ALLERGIC RHINITIS, UNSPECIFIED CHRONICITY, UNSPECIFIED TRIGGER: Primary | ICD-10-CM

## 2017-12-26 PROCEDURE — 95115 IMMUNOTHERAPY ONE INJECTION: CPT | Performed by: FAMILY MEDICINE

## 2017-12-26 NOTE — PROGRESS NOTES
Gabriella Harper was given allergy injection per written order  #1 0.30 ml SC left arm  No complications or reactions noted. Patient tolerated procedure well.

## 2017-12-29 ENCOUNTER — NURSE ONLY (OUTPATIENT)
Dept: FAMILY MEDICINE CLINIC | Age: 42
End: 2017-12-29
Payer: MEDICAID

## 2017-12-29 DIAGNOSIS — J30.2 SEASONAL ALLERGIC RHINITIS, UNSPECIFIED CHRONICITY, UNSPECIFIED TRIGGER: Primary | ICD-10-CM

## 2017-12-29 PROCEDURE — 95115 IMMUNOTHERAPY ONE INJECTION: CPT | Performed by: FAMILY MEDICINE

## 2017-12-29 RX ORDER — KETOTIFEN FUMARATE 0.35 MG/ML
2 SOLUTION/ DROPS OPHTHALMIC DAILY
Qty: 1 BOTTLE | Refills: 0 | Status: SHIPPED | OUTPATIENT
Start: 2017-12-29 | End: 2018-02-02 | Stop reason: SDUPTHER

## 2017-12-29 NOTE — PROGRESS NOTES
Brooke Santillan was given allergy injection per written order  #1 0.35 ml SC right arm  No complications or reactions noted. Patient tolerated procedure well.

## 2017-12-29 NOTE — TELEPHONE ENCOUNTER
Patient states she is having allergy symptoms in her right eye again. She is requesting a refill of the ketotifen eye drops to [facility].

## 2018-01-02 RX ORDER — LEVOCETIRIZINE DIHYDROCHLORIDE 5 MG/1
TABLET, FILM COATED ORAL
Qty: 30 TABLET | Refills: 5 | Status: SHIPPED | OUTPATIENT
Start: 2018-01-02 | End: 2018-06-27 | Stop reason: SDUPTHER

## 2018-01-05 ENCOUNTER — NURSE ONLY (OUTPATIENT)
Dept: FAMILY MEDICINE CLINIC | Age: 43
End: 2018-01-05
Payer: MEDICAID

## 2018-01-05 DIAGNOSIS — J30.2 SEASONAL ALLERGIC RHINITIS, UNSPECIFIED CHRONICITY, UNSPECIFIED TRIGGER: Primary | ICD-10-CM

## 2018-01-05 PROCEDURE — 95115 IMMUNOTHERAPY ONE INJECTION: CPT | Performed by: FAMILY MEDICINE

## 2018-01-08 ENCOUNTER — NURSE ONLY (OUTPATIENT)
Dept: FAMILY MEDICINE CLINIC | Age: 43
End: 2018-01-08
Payer: MEDICAID

## 2018-01-08 DIAGNOSIS — J30.2 SEASONAL ALLERGIC RHINITIS, UNSPECIFIED CHRONICITY, UNSPECIFIED TRIGGER: Primary | ICD-10-CM

## 2018-01-08 DIAGNOSIS — E53.8 B12 DEFICIENCY: ICD-10-CM

## 2018-01-08 PROCEDURE — 96372 THER/PROPH/DIAG INJ SC/IM: CPT | Performed by: FAMILY MEDICINE

## 2018-01-08 PROCEDURE — 95115 IMMUNOTHERAPY ONE INJECTION: CPT | Performed by: FAMILY MEDICINE

## 2018-01-08 RX ORDER — CYANOCOBALAMIN 1000 UG/ML
1000 INJECTION INTRAMUSCULAR; SUBCUTANEOUS ONCE
Status: COMPLETED | OUTPATIENT
Start: 2018-01-08 | End: 2018-01-08

## 2018-01-08 RX ADMIN — CYANOCOBALAMIN 1000 MCG: 1000 INJECTION INTRAMUSCULAR; SUBCUTANEOUS at 10:32

## 2018-01-08 NOTE — PROGRESS NOTES
Glorianne Salt was given allergy injection per written order  #1 .35 ml SC Right  arm  No complications or reactions noted. Patient tolerated procedure well. Per Mary Anne Rees MD orders Gloortegae Salt was given 1 ml  of b12 IM, in the left arm. No complications or reactions were noted. Patient tolerated procedure well.

## 2018-01-12 ENCOUNTER — NURSE ONLY (OUTPATIENT)
Dept: FAMILY MEDICINE CLINIC | Age: 43
End: 2018-01-12

## 2018-01-12 DIAGNOSIS — J30.2 SEASONAL ALLERGIC RHINITIS, UNSPECIFIED CHRONICITY, UNSPECIFIED TRIGGER: Primary | ICD-10-CM

## 2018-01-14 RX ORDER — ERGOCALCIFEROL 1.25 MG/1
CAPSULE ORAL
Qty: 4 CAPSULE | Refills: 0 | Status: SHIPPED | OUTPATIENT
Start: 2018-01-14 | End: 2018-01-17 | Stop reason: SDUPTHER

## 2018-01-14 RX ORDER — FUROSEMIDE 20 MG/1
TABLET ORAL
Qty: 30 TABLET | Refills: 0 | Status: SHIPPED | OUTPATIENT
Start: 2018-01-14 | End: 2018-01-17 | Stop reason: SDUPTHER

## 2018-01-15 RX ORDER — PHENTERMINE HYDROCHLORIDE 37.5 MG/1
37.5 CAPSULE ORAL EVERY MORNING
Qty: 30 CAPSULE | Refills: 2 | Status: SHIPPED | OUTPATIENT
Start: 2018-01-15 | End: 2018-01-17 | Stop reason: SDUPTHER

## 2018-01-17 ENCOUNTER — OFFICE VISIT (OUTPATIENT)
Dept: FAMILY MEDICINE CLINIC | Age: 43
End: 2018-01-17
Payer: MEDICAID

## 2018-01-17 VITALS
OXYGEN SATURATION: 98 % | WEIGHT: 194 LBS | DIASTOLIC BLOOD PRESSURE: 80 MMHG | HEIGHT: 61 IN | BODY MASS INDEX: 36.63 KG/M2 | RESPIRATION RATE: 18 BRPM | HEART RATE: 82 BPM | SYSTOLIC BLOOD PRESSURE: 120 MMHG

## 2018-01-17 DIAGNOSIS — E03.9 HYPOTHYROIDISM, UNSPECIFIED TYPE: ICD-10-CM

## 2018-01-17 DIAGNOSIS — F32.A DEPRESSION, UNSPECIFIED DEPRESSION TYPE: ICD-10-CM

## 2018-01-17 DIAGNOSIS — J43.8 OTHER EMPHYSEMA (HCC): ICD-10-CM

## 2018-01-17 DIAGNOSIS — B35.1 TOENAIL FUNGUS: Primary | ICD-10-CM

## 2018-01-17 PROCEDURE — G8926 SPIRO NO PERF OR DOC: HCPCS | Performed by: FAMILY MEDICINE

## 2018-01-17 PROCEDURE — G8427 DOCREV CUR MEDS BY ELIG CLIN: HCPCS | Performed by: FAMILY MEDICINE

## 2018-01-17 PROCEDURE — 99214 OFFICE O/P EST MOD 30 MIN: CPT | Performed by: FAMILY MEDICINE

## 2018-01-17 PROCEDURE — G8417 CALC BMI ABV UP PARAM F/U: HCPCS | Performed by: FAMILY MEDICINE

## 2018-01-17 PROCEDURE — G8484 FLU IMMUNIZE NO ADMIN: HCPCS | Performed by: FAMILY MEDICINE

## 2018-01-17 PROCEDURE — 1036F TOBACCO NON-USER: CPT | Performed by: FAMILY MEDICINE

## 2018-01-17 PROCEDURE — 3023F SPIROM DOC REV: CPT | Performed by: FAMILY MEDICINE

## 2018-01-17 RX ORDER — NYSTATIN 100000 [USP'U]/G
POWDER TOPICAL 3 TIMES DAILY PRN
Qty: 1 BOTTLE | Refills: 3 | Status: SHIPPED | OUTPATIENT
Start: 2018-01-17 | End: 2018-06-29 | Stop reason: SDUPTHER

## 2018-01-17 RX ORDER — NAPROXEN 500 MG/1
500 TABLET ORAL 2 TIMES DAILY
COMMUNITY
Start: 2017-12-29 | End: 2018-06-29 | Stop reason: SDUPTHER

## 2018-01-17 RX ORDER — PHENTERMINE HYDROCHLORIDE 37.5 MG/1
37.5 CAPSULE ORAL EVERY MORNING
Qty: 30 CAPSULE | Refills: 2 | Status: SHIPPED | OUTPATIENT
Start: 2018-01-17 | End: 2018-04-16 | Stop reason: SDUPTHER

## 2018-01-17 RX ORDER — ERGOCALCIFEROL 1.25 MG/1
CAPSULE ORAL
Qty: 4 CAPSULE | Refills: 2 | Status: SHIPPED | OUTPATIENT
Start: 2018-01-17 | End: 2018-05-07 | Stop reason: SDUPTHER

## 2018-01-17 RX ORDER — KETOTIFEN FUMARATE 0.35 MG/ML
2 SOLUTION/ DROPS OPHTHALMIC DAILY
Qty: 1 BOTTLE | Refills: 0 | Status: CANCELLED | OUTPATIENT
Start: 2018-01-17

## 2018-01-17 RX ORDER — FUROSEMIDE 20 MG/1
TABLET ORAL
Qty: 30 TABLET | Refills: 5 | Status: SHIPPED | OUTPATIENT
Start: 2018-01-17 | End: 2018-06-29 | Stop reason: SDUPTHER

## 2018-01-17 RX ORDER — TERBINAFINE HYDROCHLORIDE 250 MG/1
250 TABLET ORAL DAILY
Qty: 30 TABLET | Refills: 0 | Status: SHIPPED | OUTPATIENT
Start: 2018-01-17 | End: 2018-06-29 | Stop reason: ALTCHOICE

## 2018-01-17 RX ORDER — TERBINAFINE HYDROCHLORIDE 250 MG/1
250 TABLET ORAL DAILY
Qty: 30 TABLET | Refills: 0 | Status: CANCELLED | OUTPATIENT
Start: 2018-01-17

## 2018-01-17 RX ORDER — CLONAZEPAM 0.5 MG/1
TABLET ORAL
Qty: 45 TABLET | Refills: 2 | Status: SHIPPED | OUTPATIENT
Start: 2018-01-17 | End: 2018-04-16 | Stop reason: SDUPTHER

## 2018-01-17 RX ORDER — BUPROPION HYDROCHLORIDE 300 MG/1
TABLET ORAL
Qty: 30 TABLET | Refills: 5 | Status: SHIPPED | OUTPATIENT
Start: 2018-01-17 | End: 2018-06-29 | Stop reason: SDUPTHER

## 2018-01-17 RX ORDER — CETIRIZINE HYDROCHLORIDE 10 MG/1
TABLET ORAL
Qty: 30 TABLET | Refills: 5 | Status: SHIPPED | OUTPATIENT
Start: 2018-01-17 | End: 2018-06-29 | Stop reason: SDUPTHER

## 2018-01-17 RX ORDER — MONTELUKAST SODIUM 10 MG/1
TABLET ORAL
Qty: 30 TABLET | Refills: 5 | Status: SHIPPED | OUTPATIENT
Start: 2018-01-17 | End: 2018-06-29 | Stop reason: SDUPTHER

## 2018-01-17 ASSESSMENT — ENCOUNTER SYMPTOMS: VOICE CHANGE: 1

## 2018-01-17 NOTE — PROGRESS NOTES
SUBJECTIVE:    Patient ID: Sherly Spence is a 41 y.o. female. Chief Complaint   Patient presents with    Hypothyroidism     f/u    Injections     Allergy       HPI: she is having some significant fatigue. She's not had any chest pain or shortness of breath. She denies any significant allergy issues. She's doing relatively well with her medicine. She continues to see pain management for her back. She's not have any medication problems that she denies any other new issues. Review of Systems   HENT: Positive for voice change. Musculoskeletal: Positive for gait problem. Psychiatric/Behavioral: The patient is nervous/anxious. All other systems reviewed and are negative. OBJECTIVE:  Wt Readings from Last 3 Encounters:   01/17/18 194 lb (88 kg)   10/17/17 197 lb (89.4 kg)   07/17/17 196 lb (88.9 kg)     BP Readings from Last 3 Encounters:   01/17/18 120/80   10/17/17 120/68   07/17/17 108/76      Pulse Readings from Last 3 Encounters:   01/17/18 82   10/17/17 82   07/17/17 86     Body mass index is 36.66 kg/m². Resp Readings from Last 3 Encounters:   01/17/18 18   10/17/17 18   07/17/17 18     Physical Exam   Constitutional: She is oriented to person, place, and time. Vital signs are normal. She appears well-developed and well-nourished. HENT:   Head: Normocephalic and atraumatic. Right Ear: Hearing and external ear normal.   Left Ear: Hearing and external ear normal.   Nose: Nose normal.   Mouth/Throat: Uvula is midline, oropharynx is clear and moist and mucous membranes are normal.   Eyes: Conjunctivae, EOM and lids are normal. Pupils are equal, round, and reactive to light. Neck: Trachea normal, normal range of motion and phonation normal. Neck supple. Carotid bruit is not present. No thyroid mass and no thyromegaly present. Cardiovascular: Normal rate, regular rhythm, S1 normal, S2 normal, normal heart sounds, intact distal pulses and normal pulses.   Exam reveals no gallop and no (WELLBUTRIN XL) 300 MG extended release tablet     Sig: TAKE ONE TABLET EVERY MORNING     Dispense:  30 tablet     Refill:  5    cetirizine (ZYRTEC) 10 MG tablet     Sig: TAKE ONE TABLET BY MOUTH EVERY DAY     Dispense:  30 tablet     Refill:  5    nystatin (MYCOSTATIN) 322744 UNIT/GM powder     Sig: Apply topically 3 times daily as needed (yeast)     Dispense:  1 Bottle     Refill:  3    terbinafine (LAMISIL) 250 MG tablet     Sig: Take 1 tablet by mouth daily     Dispense:  30 tablet     Refill:  0        Medications Discontinued During This Encounter   Medication Reason    nystatin (MYCOSTATIN) 964484 UNIT/GM cream     phentermine (ADIPEX-P) 37.5 MG capsule Reorder    furosemide (LASIX) 20 MG tablet Reorder    vitamin D (ERGOCALCIFEROL) 34576 units CAPS capsule Reorder    montelukast (SINGULAIR) 10 MG tablet Reorder    clonazePAM (KLONOPIN) 0.5 MG tablet Reorder    buPROPion (WELLBUTRIN XL) 300 MG extended release tablet Reorder    cetirizine (ZYRTEC) 10 MG tablet Reorder    terbinafine (LAMISIL) 250 MG tablet Reorder       Controlled Substances Monitoring:

## 2018-01-23 ENCOUNTER — NURSE ONLY (OUTPATIENT)
Dept: FAMILY MEDICINE CLINIC | Age: 43
End: 2018-01-23
Payer: MEDICAID

## 2018-01-23 DIAGNOSIS — J30.2 SEASONAL ALLERGIC RHINITIS, UNSPECIFIED CHRONICITY, UNSPECIFIED TRIGGER: Primary | ICD-10-CM

## 2018-01-23 DIAGNOSIS — E53.8 B12 DEFICIENCY: ICD-10-CM

## 2018-01-23 PROCEDURE — 95115 IMMUNOTHERAPY ONE INJECTION: CPT | Performed by: FAMILY MEDICINE

## 2018-01-23 PROCEDURE — 96372 THER/PROPH/DIAG INJ SC/IM: CPT | Performed by: FAMILY MEDICINE

## 2018-01-23 RX ORDER — CYANOCOBALAMIN 1000 UG/ML
1000 INJECTION INTRAMUSCULAR; SUBCUTANEOUS ONCE
Status: COMPLETED | OUTPATIENT
Start: 2018-01-23 | End: 2018-01-23

## 2018-01-23 RX ADMIN — CYANOCOBALAMIN 1000 MCG: 1000 INJECTION INTRAMUSCULAR; SUBCUTANEOUS at 09:27

## 2018-01-30 ENCOUNTER — NURSE ONLY (OUTPATIENT)
Dept: FAMILY MEDICINE CLINIC | Age: 43
End: 2018-01-30

## 2018-01-30 DIAGNOSIS — J30.2 SEASONAL ALLERGIC RHINITIS, UNSPECIFIED CHRONICITY, UNSPECIFIED TRIGGER: Primary | ICD-10-CM

## 2018-01-30 NOTE — PROGRESS NOTES
Ameya Leary was given 2 allergy injections per written orders. #1 0.35 ml SC right arm    No complications or reactions noted. Patient tolerated procedure well.

## 2018-02-03 RX ORDER — KETOTIFEN FUMARATE 0.35 MG/ML
SOLUTION/ DROPS OPHTHALMIC
Qty: 5 ML | Refills: 0 | Status: SHIPPED | OUTPATIENT
Start: 2018-02-03 | End: 2020-11-04

## 2018-02-08 ENCOUNTER — NURSE ONLY (OUTPATIENT)
Dept: FAMILY MEDICINE CLINIC | Age: 43
End: 2018-02-08
Payer: MEDICAID

## 2018-02-08 DIAGNOSIS — J30.2 SEASONAL ALLERGIC RHINITIS, UNSPECIFIED CHRONICITY, UNSPECIFIED TRIGGER: Primary | ICD-10-CM

## 2018-02-08 PROCEDURE — 95115 IMMUNOTHERAPY ONE INJECTION: CPT | Performed by: FAMILY MEDICINE

## 2018-02-13 ENCOUNTER — NURSE ONLY (OUTPATIENT)
Dept: FAMILY MEDICINE CLINIC | Age: 43
End: 2018-02-13
Payer: MEDICAID

## 2018-02-13 DIAGNOSIS — J30.2 SEASONAL ALLERGIC RHINITIS, UNSPECIFIED CHRONICITY, UNSPECIFIED TRIGGER: Primary | ICD-10-CM

## 2018-02-13 PROCEDURE — 95115 IMMUNOTHERAPY ONE INJECTION: CPT | Performed by: FAMILY MEDICINE

## 2018-02-15 RX ORDER — FLUTICASONE PROPIONATE 50 MCG
SPRAY, SUSPENSION (ML) NASAL
Qty: 1 BOTTLE | Refills: 5 | Status: SHIPPED | OUTPATIENT
Start: 2018-02-15 | End: 2018-08-03 | Stop reason: SDUPTHER

## 2018-02-16 ENCOUNTER — NURSE ONLY (OUTPATIENT)
Dept: FAMILY MEDICINE CLINIC | Age: 43
End: 2018-02-16

## 2018-02-16 DIAGNOSIS — J30.9 ALLERGIC RHINITIS, UNSPECIFIED CHRONICITY, UNSPECIFIED SEASONALITY, UNSPECIFIED TRIGGER: Primary | ICD-10-CM

## 2018-02-26 ENCOUNTER — NURSE ONLY (OUTPATIENT)
Dept: FAMILY MEDICINE CLINIC | Age: 43
End: 2018-02-26
Payer: MEDICAID

## 2018-02-26 DIAGNOSIS — E53.8 B12 DEFICIENCY: Primary | ICD-10-CM

## 2018-02-26 PROCEDURE — 95115 IMMUNOTHERAPY ONE INJECTION: CPT | Performed by: FAMILY MEDICINE

## 2018-02-26 PROCEDURE — 96372 THER/PROPH/DIAG INJ SC/IM: CPT | Performed by: FAMILY MEDICINE

## 2018-02-26 RX ORDER — CYANOCOBALAMIN 1000 UG/ML
1000 INJECTION INTRAMUSCULAR; SUBCUTANEOUS ONCE
Status: COMPLETED | OUTPATIENT
Start: 2018-02-26 | End: 2018-02-26

## 2018-02-26 RX ADMIN — CYANOCOBALAMIN 1000 MCG: 1000 INJECTION INTRAMUSCULAR; SUBCUTANEOUS at 15:49

## 2018-03-01 ENCOUNTER — NURSE ONLY (OUTPATIENT)
Dept: FAMILY MEDICINE CLINIC | Age: 43
End: 2018-03-01
Payer: MEDICAID

## 2018-03-01 DIAGNOSIS — J30.2 SEASONAL ALLERGIC RHINITIS, UNSPECIFIED CHRONICITY, UNSPECIFIED TRIGGER: Primary | ICD-10-CM

## 2018-03-01 PROCEDURE — 95115 IMMUNOTHERAPY ONE INJECTION: CPT | Performed by: FAMILY MEDICINE

## 2018-03-05 ENCOUNTER — NURSE ONLY (OUTPATIENT)
Dept: FAMILY MEDICINE CLINIC | Age: 43
End: 2018-03-05
Payer: MEDICAID

## 2018-03-05 DIAGNOSIS — J30.2 SEASONAL ALLERGIC RHINITIS, UNSPECIFIED CHRONICITY, UNSPECIFIED TRIGGER: Primary | ICD-10-CM

## 2018-03-05 PROCEDURE — 95115 IMMUNOTHERAPY ONE INJECTION: CPT | Performed by: FAMILY MEDICINE

## 2018-03-05 RX ORDER — FAMOTIDINE 40 MG/1
TABLET, FILM COATED ORAL
Qty: 60 TABLET | Refills: 0 | Status: SHIPPED | OUTPATIENT
Start: 2018-03-05 | End: 2018-03-30 | Stop reason: SDUPTHER

## 2018-03-05 RX ORDER — DEXLANSOPRAZOLE 60 MG/1
CAPSULE, DELAYED RELEASE ORAL
Qty: 30 CAPSULE | Refills: 0 | OUTPATIENT
Start: 2018-03-05

## 2018-03-15 ENCOUNTER — NURSE ONLY (OUTPATIENT)
Dept: FAMILY MEDICINE CLINIC | Age: 43
End: 2018-03-15

## 2018-03-15 DIAGNOSIS — J30.2 SEASONAL ALLERGIC RHINITIS, UNSPECIFIED CHRONICITY, UNSPECIFIED TRIGGER: Primary | ICD-10-CM

## 2018-03-19 ENCOUNTER — NURSE ONLY (OUTPATIENT)
Dept: FAMILY MEDICINE CLINIC | Age: 43
End: 2018-03-19
Payer: MEDICAID

## 2018-03-19 DIAGNOSIS — J30.2 SEASONAL ALLERGIC RHINITIS, UNSPECIFIED CHRONICITY, UNSPECIFIED TRIGGER: Primary | ICD-10-CM

## 2018-03-19 PROCEDURE — 95115 IMMUNOTHERAPY ONE INJECTION: CPT | Performed by: FAMILY MEDICINE

## 2018-03-22 ENCOUNTER — NURSE ONLY (OUTPATIENT)
Dept: FAMILY MEDICINE CLINIC | Age: 43
End: 2018-03-22
Payer: MEDICAID

## 2018-03-22 DIAGNOSIS — J30.2 SEASONAL ALLERGIC RHINITIS, UNSPECIFIED CHRONICITY, UNSPECIFIED TRIGGER: Primary | ICD-10-CM

## 2018-03-22 PROCEDURE — 95115 IMMUNOTHERAPY ONE INJECTION: CPT | Performed by: FAMILY MEDICINE

## 2018-03-22 NOTE — PROGRESS NOTES
Aram Dobbins was given allergy injection per written order  #1 0.35  ml SC L arm  No complications or reactions noted. Patient tolerated procedure well.

## 2018-03-29 ENCOUNTER — NURSE ONLY (OUTPATIENT)
Dept: FAMILY MEDICINE CLINIC | Age: 43
End: 2018-03-29
Payer: MEDICAID

## 2018-03-29 DIAGNOSIS — J30.2 SEASONAL ALLERGIC RHINITIS, UNSPECIFIED CHRONICITY, UNSPECIFIED TRIGGER: ICD-10-CM

## 2018-03-29 DIAGNOSIS — E53.8 B12 DEFICIENCY: Primary | ICD-10-CM

## 2018-03-29 PROCEDURE — 95115 IMMUNOTHERAPY ONE INJECTION: CPT | Performed by: FAMILY MEDICINE

## 2018-03-29 PROCEDURE — 96372 THER/PROPH/DIAG INJ SC/IM: CPT | Performed by: FAMILY MEDICINE

## 2018-03-29 RX ORDER — CYANOCOBALAMIN 1000 UG/ML
1000 INJECTION INTRAMUSCULAR; SUBCUTANEOUS ONCE
Status: COMPLETED | OUTPATIENT
Start: 2018-03-29 | End: 2018-03-29

## 2018-03-29 RX ADMIN — CYANOCOBALAMIN 1000 MCG: 1000 INJECTION INTRAMUSCULAR; SUBCUTANEOUS at 10:47

## 2018-03-29 NOTE — PROGRESS NOTES
Per Heather Cantor MD orders [facility] was given 1mL  of Vitamin B12 IM, in the Left Deltoid. No complications or reactions were noted. Patient tolerated procedure well. [facility] was given allergy injection per written order  #1 0.35 ml SC Right arm  No complications or reactions noted. Patient tolerated procedure well.

## 2018-04-02 ENCOUNTER — NURSE ONLY (OUTPATIENT)
Dept: FAMILY MEDICINE CLINIC | Age: 43
End: 2018-04-02
Payer: MEDICAID

## 2018-04-02 DIAGNOSIS — J30.2 SEASONAL ALLERGIC RHINITIS, UNSPECIFIED CHRONICITY, UNSPECIFIED TRIGGER: Primary | ICD-10-CM

## 2018-04-02 PROCEDURE — 95115 IMMUNOTHERAPY ONE INJECTION: CPT | Performed by: FAMILY MEDICINE

## 2018-04-02 RX ORDER — FAMOTIDINE 40 MG/1
TABLET, FILM COATED ORAL
Qty: 60 TABLET | Refills: 2 | Status: SHIPPED | OUTPATIENT
Start: 2018-04-02 | End: 2018-06-27 | Stop reason: SDUPTHER

## 2018-04-05 ENCOUNTER — NURSE ONLY (OUTPATIENT)
Dept: FAMILY MEDICINE CLINIC | Age: 43
End: 2018-04-05
Payer: MEDICAID

## 2018-04-05 DIAGNOSIS — J30.2 SEASONAL ALLERGIC RHINITIS, UNSPECIFIED CHRONICITY, UNSPECIFIED TRIGGER: Primary | ICD-10-CM

## 2018-04-05 PROCEDURE — 95115 IMMUNOTHERAPY ONE INJECTION: CPT | Performed by: FAMILY MEDICINE

## 2018-04-11 RX ORDER — BUDESONIDE AND FORMOTEROL FUMARATE DIHYDRATE 160; 4.5 UG/1; UG/1
AEROSOL RESPIRATORY (INHALATION)
Qty: 1 INHALER | Refills: 3 | Status: SHIPPED | OUTPATIENT
Start: 2018-04-11 | End: 2018-08-03 | Stop reason: SDUPTHER

## 2018-04-11 RX ORDER — LEVOTHYROXINE SODIUM 88 UG/1
TABLET ORAL
Qty: 30 TABLET | Refills: 3 | Status: SHIPPED | OUTPATIENT
Start: 2018-04-11 | End: 2018-07-03

## 2018-04-11 RX ORDER — OXYBUTYNIN CHLORIDE 10 MG/1
TABLET, EXTENDED RELEASE ORAL
Qty: 30 TABLET | Refills: 3 | Status: SHIPPED | OUTPATIENT
Start: 2018-04-11 | End: 2018-08-03 | Stop reason: SDUPTHER

## 2018-04-12 ENCOUNTER — NURSE ONLY (OUTPATIENT)
Dept: FAMILY MEDICINE CLINIC | Age: 43
End: 2018-04-12
Payer: MEDICAID

## 2018-04-12 DIAGNOSIS — J30.2 SEASONAL ALLERGIC RHINITIS, UNSPECIFIED CHRONICITY, UNSPECIFIED TRIGGER: Primary | ICD-10-CM

## 2018-04-12 PROCEDURE — 95117 IMMUNOTHERAPY INJECTIONS: CPT | Performed by: FAMILY MEDICINE

## 2018-04-16 ENCOUNTER — HOSPITAL ENCOUNTER (OUTPATIENT)
Dept: OTHER | Age: 43
Discharge: HOME | End: 2018-04-16
Attending: FAMILY MEDICINE | Admitting: FAMILY MEDICINE

## 2018-04-16 ENCOUNTER — OFFICE VISIT (OUTPATIENT)
Dept: FAMILY MEDICINE CLINIC | Age: 43
End: 2018-04-16
Payer: MEDICAID

## 2018-04-16 VITALS
DIASTOLIC BLOOD PRESSURE: 72 MMHG | OXYGEN SATURATION: 98 % | HEIGHT: 61 IN | HEART RATE: 88 BPM | RESPIRATION RATE: 20 BRPM | WEIGHT: 192.7 LBS | BODY MASS INDEX: 36.38 KG/M2 | SYSTOLIC BLOOD PRESSURE: 130 MMHG

## 2018-04-16 DIAGNOSIS — E55.9 VITAMIN D DEFICIENCY: ICD-10-CM

## 2018-04-16 DIAGNOSIS — J30.2 SEASONAL ALLERGIC RHINITIS, UNSPECIFIED CHRONICITY, UNSPECIFIED TRIGGER: ICD-10-CM

## 2018-04-16 DIAGNOSIS — F32.A DEPRESSION, UNSPECIFIED DEPRESSION TYPE: Primary | ICD-10-CM

## 2018-04-16 DIAGNOSIS — K21.9 GASTROESOPHAGEAL REFLUX DISEASE WITHOUT ESOPHAGITIS: ICD-10-CM

## 2018-04-16 DIAGNOSIS — E03.9 HYPOTHYROIDISM, UNSPECIFIED TYPE: ICD-10-CM

## 2018-04-16 DIAGNOSIS — R53.83 FATIGUE, UNSPECIFIED TYPE: ICD-10-CM

## 2018-04-16 DIAGNOSIS — J43.8 OTHER EMPHYSEMA (HCC): ICD-10-CM

## 2018-04-16 PROCEDURE — 99214 OFFICE O/P EST MOD 30 MIN: CPT | Performed by: FAMILY MEDICINE

## 2018-04-16 PROCEDURE — G8417 CALC BMI ABV UP PARAM F/U: HCPCS | Performed by: FAMILY MEDICINE

## 2018-04-16 PROCEDURE — 1036F TOBACCO NON-USER: CPT | Performed by: FAMILY MEDICINE

## 2018-04-16 PROCEDURE — G8926 SPIRO NO PERF OR DOC: HCPCS | Performed by: FAMILY MEDICINE

## 2018-04-16 PROCEDURE — 3023F SPIROM DOC REV: CPT | Performed by: FAMILY MEDICINE

## 2018-04-16 PROCEDURE — G8427 DOCREV CUR MEDS BY ELIG CLIN: HCPCS | Performed by: FAMILY MEDICINE

## 2018-04-16 RX ORDER — CLONAZEPAM 0.5 MG/1
TABLET ORAL
Qty: 45 TABLET | Refills: 2 | Status: SHIPPED | OUTPATIENT
Start: 2018-04-16 | End: 2018-06-29 | Stop reason: SDUPTHER

## 2018-04-16 RX ORDER — PHENTERMINE HYDROCHLORIDE 37.5 MG/1
37.5 CAPSULE ORAL EVERY MORNING
Qty: 30 CAPSULE | Refills: 2 | Status: SHIPPED | OUTPATIENT
Start: 2018-04-16 | End: 2018-05-16

## 2018-04-16 ASSESSMENT — ENCOUNTER SYMPTOMS: VOICE CHANGE: 1

## 2018-04-17 LAB
A/G RATIO: 1.6 (ref 0.8–2)
ALBUMIN SERPL-MCNC: 4.2 G/DL (ref 3.4–4.8)
ALP BLD-CCNC: 106 U/L (ref 25–100)
ALT SERPL-CCNC: 7 U/L (ref 4–36)
ANION GAP SERPL CALCULATED.3IONS-SCNC: 11 MMOL/L (ref 3–16)
AST SERPL-CCNC: 14 U/L (ref 8–33)
BILIRUB SERPL-MCNC: <0.2 MG/DL (ref 0.3–1.2)
BUN BLDV-MCNC: 13 MG/DL (ref 6–20)
CALCIUM SERPL-MCNC: 9 MG/DL (ref 8.5–10.5)
CHLORIDE BLD-SCNC: 102 MMOL/L (ref 98–107)
CO2: 29 MMOL/L (ref 20–30)
CREAT SERPL-MCNC: 0.8 MG/DL (ref 0.4–1.2)
FOLATE: 19.61 NG/ML
GFR AFRICAN AMERICAN: >59
GFR NON-AFRICAN AMERICAN: >60
GLOBULIN: 2.6 G/DL
GLUCOSE BLD-MCNC: 140 MG/DL (ref 74–106)
HCT VFR BLD CALC: 40.1 % (ref 37–47)
HEMOGLOBIN: 12.6 G/DL (ref 11.5–16.5)
MCH RBC QN AUTO: 29.2 PG (ref 27–32)
MCHC RBC AUTO-ENTMCNC: 31.4 G/DL (ref 31–35)
MCV RBC AUTO: 93 FL (ref 80–100)
PDW BLD-RTO: 13.6 % (ref 11–16)
PLATELET # BLD: 304 K/UL (ref 150–400)
PMV BLD AUTO: 11 FL (ref 6–10)
POTASSIUM SERPL-SCNC: 4.4 MMOL/L (ref 3.4–5.1)
RBC # BLD: 4.31 M/UL (ref 3.8–5.8)
SODIUM BLD-SCNC: 142 MMOL/L (ref 136–145)
T4 FREE: 2.02 NG/DL (ref 0.89–1.76)
TOTAL PROTEIN: 6.8 G/DL (ref 6.4–8.3)
TSH SERPL DL<=0.05 MIU/L-ACNC: 0.96 UIU/ML (ref 0.35–5.5)
VITAMIN B-12: 664 PG/ML (ref 211–911)
VITAMIN D 25-HYDROXY: 27.9 (ref 32–100)
WBC # BLD: 9.1 K/UL (ref 4–11)

## 2018-04-19 ENCOUNTER — NURSE ONLY (OUTPATIENT)
Dept: FAMILY MEDICINE CLINIC | Age: 43
End: 2018-04-19
Payer: MEDICAID

## 2018-04-19 DIAGNOSIS — J30.2 SEASONAL ALLERGIC RHINITIS, UNSPECIFIED CHRONICITY, UNSPECIFIED TRIGGER: Primary | ICD-10-CM

## 2018-04-19 PROCEDURE — 95115 IMMUNOTHERAPY ONE INJECTION: CPT | Performed by: FAMILY MEDICINE

## 2018-04-23 ENCOUNTER — NURSE ONLY (OUTPATIENT)
Dept: FAMILY MEDICINE CLINIC | Age: 43
End: 2018-04-23
Payer: MEDICAID

## 2018-04-23 DIAGNOSIS — J30.2 SEASONAL ALLERGIC RHINITIS, UNSPECIFIED CHRONICITY, UNSPECIFIED TRIGGER: Primary | ICD-10-CM

## 2018-04-23 PROCEDURE — 95115 IMMUNOTHERAPY ONE INJECTION: CPT | Performed by: FAMILY MEDICINE

## 2018-04-30 ENCOUNTER — NURSE ONLY (OUTPATIENT)
Dept: FAMILY MEDICINE CLINIC | Age: 43
End: 2018-04-30
Payer: MEDICAID

## 2018-04-30 DIAGNOSIS — E53.8 B12 DEFICIENCY: Primary | ICD-10-CM

## 2018-04-30 DIAGNOSIS — J30.9 ALLERGIC RHINITIS, UNSPECIFIED CHRONICITY, UNSPECIFIED SEASONALITY, UNSPECIFIED TRIGGER: ICD-10-CM

## 2018-04-30 PROCEDURE — 96372 THER/PROPH/DIAG INJ SC/IM: CPT | Performed by: FAMILY MEDICINE

## 2018-04-30 PROCEDURE — 95115 IMMUNOTHERAPY ONE INJECTION: CPT | Performed by: FAMILY MEDICINE

## 2018-04-30 RX ORDER — CYANOCOBALAMIN 1000 UG/ML
1000 INJECTION INTRAMUSCULAR; SUBCUTANEOUS ONCE
Status: COMPLETED | OUTPATIENT
Start: 2018-04-30 | End: 2018-04-30

## 2018-04-30 RX ADMIN — CYANOCOBALAMIN 1000 MCG: 1000 INJECTION INTRAMUSCULAR; SUBCUTANEOUS at 16:52

## 2018-05-03 ENCOUNTER — NURSE ONLY (OUTPATIENT)
Dept: FAMILY MEDICINE CLINIC | Age: 43
End: 2018-05-03
Payer: MEDICAID

## 2018-05-03 DIAGNOSIS — J30.2 SEASONAL ALLERGIC RHINITIS, UNSPECIFIED CHRONICITY, UNSPECIFIED TRIGGER: Primary | ICD-10-CM

## 2018-05-03 PROCEDURE — 95115 IMMUNOTHERAPY ONE INJECTION: CPT | Performed by: FAMILY MEDICINE

## 2018-05-07 RX ORDER — ERGOCALCIFEROL 1.25 MG/1
CAPSULE ORAL
Qty: 4 CAPSULE | Refills: 0 | Status: SHIPPED | OUTPATIENT
Start: 2018-05-07 | End: 2018-06-08 | Stop reason: SDUPTHER

## 2018-05-10 ENCOUNTER — NURSE ONLY (OUTPATIENT)
Dept: FAMILY MEDICINE CLINIC | Age: 43
End: 2018-05-10
Payer: MEDICAID

## 2018-05-10 DIAGNOSIS — J30.2 SEASONAL ALLERGIC RHINITIS, UNSPECIFIED CHRONICITY, UNSPECIFIED TRIGGER: Primary | ICD-10-CM

## 2018-05-10 PROCEDURE — 95115 IMMUNOTHERAPY ONE INJECTION: CPT | Performed by: FAMILY MEDICINE

## 2018-05-21 ENCOUNTER — NURSE ONLY (OUTPATIENT)
Dept: FAMILY MEDICINE CLINIC | Age: 43
End: 2018-05-21
Payer: MEDICAID

## 2018-05-21 DIAGNOSIS — J30.2 SEASONAL ALLERGIC RHINITIS, UNSPECIFIED CHRONICITY, UNSPECIFIED TRIGGER: Primary | ICD-10-CM

## 2018-05-21 PROCEDURE — 95115 IMMUNOTHERAPY ONE INJECTION: CPT | Performed by: FAMILY MEDICINE

## 2018-05-30 ENCOUNTER — NURSE ONLY (OUTPATIENT)
Dept: FAMILY MEDICINE CLINIC | Age: 43
End: 2018-05-30
Payer: MEDICAID

## 2018-05-30 DIAGNOSIS — E53.8 VITAMIN B12 DEFICIENCY: ICD-10-CM

## 2018-05-30 DIAGNOSIS — J30.2 SEASONAL ALLERGIC RHINITIS, UNSPECIFIED CHRONICITY, UNSPECIFIED TRIGGER: Primary | ICD-10-CM

## 2018-05-30 PROCEDURE — 96372 THER/PROPH/DIAG INJ SC/IM: CPT | Performed by: FAMILY MEDICINE

## 2018-05-30 PROCEDURE — 95115 IMMUNOTHERAPY ONE INJECTION: CPT | Performed by: FAMILY MEDICINE

## 2018-05-30 RX ORDER — CYANOCOBALAMIN 1000 UG/ML
1000 INJECTION INTRAMUSCULAR; SUBCUTANEOUS ONCE
Status: COMPLETED | OUTPATIENT
Start: 2018-05-30 | End: 2018-05-30

## 2018-05-30 RX ADMIN — CYANOCOBALAMIN 1000 MCG: 1000 INJECTION INTRAMUSCULAR; SUBCUTANEOUS at 16:36

## 2018-06-07 ENCOUNTER — NURSE ONLY (OUTPATIENT)
Dept: FAMILY MEDICINE CLINIC | Age: 43
End: 2018-06-07
Payer: MEDICAID

## 2018-06-07 DIAGNOSIS — J30.9 ALLERGIC RHINITIS, UNSPECIFIED CHRONICITY, UNSPECIFIED SEASONALITY, UNSPECIFIED TRIGGER: Primary | ICD-10-CM

## 2018-06-07 PROCEDURE — 95115 IMMUNOTHERAPY ONE INJECTION: CPT | Performed by: FAMILY MEDICINE

## 2018-06-08 RX ORDER — ERGOCALCIFEROL 1.25 MG/1
CAPSULE ORAL
Qty: 4 CAPSULE | Refills: 3 | Status: SHIPPED | OUTPATIENT
Start: 2018-06-08 | End: 2018-10-03 | Stop reason: SDUPTHER

## 2018-06-13 ENCOUNTER — NURSE ONLY (OUTPATIENT)
Dept: FAMILY MEDICINE CLINIC | Age: 43
End: 2018-06-13
Payer: MEDICAID

## 2018-06-13 DIAGNOSIS — J30.9 CHRONIC ALLERGIC RHINITIS, UNSPECIFIED SEASONALITY, UNSPECIFIED TRIGGER: Primary | ICD-10-CM

## 2018-06-13 PROCEDURE — 95115 IMMUNOTHERAPY ONE INJECTION: CPT | Performed by: FAMILY MEDICINE

## 2018-06-27 RX ORDER — FAMOTIDINE 40 MG/1
TABLET, FILM COATED ORAL
Qty: 60 TABLET | Refills: 5 | Status: SHIPPED | OUTPATIENT
Start: 2018-06-27 | End: 2018-12-26 | Stop reason: SDUPTHER

## 2018-06-27 RX ORDER — LEVOCETIRIZINE DIHYDROCHLORIDE 5 MG/1
TABLET, FILM COATED ORAL
Qty: 30 TABLET | Refills: 5 | Status: SHIPPED | OUTPATIENT
Start: 2018-06-27 | End: 2018-12-26 | Stop reason: SDUPTHER

## 2018-06-29 ENCOUNTER — OFFICE VISIT (OUTPATIENT)
Dept: FAMILY MEDICINE CLINIC | Age: 43
End: 2018-06-29
Payer: MEDICAID

## 2018-06-29 ENCOUNTER — HOSPITAL ENCOUNTER (OUTPATIENT)
Dept: OTHER | Age: 43
Discharge: HOME | End: 2018-06-29
Attending: FAMILY MEDICINE | Admitting: FAMILY MEDICINE

## 2018-06-29 VITALS
SYSTOLIC BLOOD PRESSURE: 122 MMHG | WEIGHT: 199 LBS | BODY MASS INDEX: 37.57 KG/M2 | DIASTOLIC BLOOD PRESSURE: 70 MMHG | HEART RATE: 85 BPM | HEIGHT: 61 IN | OXYGEN SATURATION: 98 %

## 2018-06-29 DIAGNOSIS — E03.9 HYPOTHYROIDISM, UNSPECIFIED TYPE: ICD-10-CM

## 2018-06-29 DIAGNOSIS — F41.9 ANXIETY: ICD-10-CM

## 2018-06-29 DIAGNOSIS — R63.5 WEIGHT GAIN: ICD-10-CM

## 2018-06-29 DIAGNOSIS — E53.8 B12 DEFICIENCY: Primary | ICD-10-CM

## 2018-06-29 LAB
T4 FREE: 1.64 NG/DL (ref 0.89–1.76)
TSH SERPL DL<=0.05 MIU/L-ACNC: 6.71 UIU/ML (ref 0.35–5.5)

## 2018-06-29 PROCEDURE — G8417 CALC BMI ABV UP PARAM F/U: HCPCS | Performed by: FAMILY MEDICINE

## 2018-06-29 PROCEDURE — 1036F TOBACCO NON-USER: CPT | Performed by: FAMILY MEDICINE

## 2018-06-29 PROCEDURE — G8427 DOCREV CUR MEDS BY ELIG CLIN: HCPCS | Performed by: FAMILY MEDICINE

## 2018-06-29 PROCEDURE — 99214 OFFICE O/P EST MOD 30 MIN: CPT | Performed by: FAMILY MEDICINE

## 2018-06-29 RX ORDER — NAPROXEN 500 MG/1
500 TABLET ORAL 2 TIMES DAILY
Qty: 60 TABLET | Refills: 2 | Status: SHIPPED | OUTPATIENT
Start: 2018-06-29 | End: 2019-09-16 | Stop reason: SDUPTHER

## 2018-06-29 RX ORDER — CETIRIZINE HYDROCHLORIDE 10 MG/1
TABLET ORAL
Qty: 30 TABLET | Refills: 5 | Status: SHIPPED | OUTPATIENT
Start: 2018-06-29 | End: 2019-06-21

## 2018-06-29 RX ORDER — CLONAZEPAM 0.5 MG/1
TABLET ORAL
Qty: 45 TABLET | Refills: 2 | Status: SHIPPED | OUTPATIENT
Start: 2018-06-29 | End: 2018-09-24 | Stop reason: SDUPTHER

## 2018-06-29 RX ORDER — DEXLANSOPRAZOLE 60 MG/1
60 CAPSULE, DELAYED RELEASE ORAL DAILY
Qty: 30 CAPSULE | Refills: 5 | Status: SHIPPED | OUTPATIENT
Start: 2018-06-29 | End: 2018-12-26 | Stop reason: SDUPTHER

## 2018-06-29 RX ORDER — PHENTERMINE HYDROCHLORIDE 37.5 MG/1
37.5 TABLET ORAL
Qty: 30 TABLET | Refills: 2 | Status: SHIPPED | OUTPATIENT
Start: 2018-06-29 | End: 2018-09-24 | Stop reason: SDUPTHER

## 2018-06-29 RX ORDER — CYANOCOBALAMIN 1000 UG/ML
1000 INJECTION INTRAMUSCULAR; SUBCUTANEOUS ONCE
Status: COMPLETED | OUTPATIENT
Start: 2018-06-29 | End: 2018-06-29

## 2018-06-29 RX ORDER — NYSTATIN 100000 [USP'U]/G
POWDER TOPICAL 3 TIMES DAILY PRN
Qty: 1 BOTTLE | Refills: 3 | Status: SHIPPED | OUTPATIENT
Start: 2018-06-29 | End: 2020-07-23 | Stop reason: SDUPTHER

## 2018-06-29 RX ORDER — MORPHINE SULFATE 15 MG/1
15 TABLET, FILM COATED, EXTENDED RELEASE ORAL NIGHTLY
Qty: 30 TABLET | Refills: 0 | Status: CANCELLED | OUTPATIENT
Start: 2018-06-29 | End: 2018-07-29

## 2018-06-29 RX ORDER — MONTELUKAST SODIUM 10 MG/1
TABLET ORAL
Qty: 30 TABLET | Refills: 5 | Status: SHIPPED | OUTPATIENT
Start: 2018-06-29 | End: 2018-12-26 | Stop reason: SDUPTHER

## 2018-06-29 RX ORDER — OXYCODONE AND ACETAMINOPHEN 7.5; 325 MG/1; MG/1
1 TABLET ORAL EVERY 8 HOURS PRN
Qty: 30 TABLET | Status: CANCELLED | OUTPATIENT
Start: 2018-06-29 | End: 2018-07-29

## 2018-06-29 RX ORDER — FUROSEMIDE 20 MG/1
TABLET ORAL
Qty: 30 TABLET | Refills: 5 | Status: SHIPPED | OUTPATIENT
Start: 2018-06-29 | End: 2019-01-24 | Stop reason: SDUPTHER

## 2018-06-29 RX ORDER — BUPROPION HYDROCHLORIDE 300 MG/1
TABLET ORAL
Qty: 30 TABLET | Refills: 5 | Status: SHIPPED | OUTPATIENT
Start: 2018-06-29 | End: 2019-01-24 | Stop reason: SDUPTHER

## 2018-06-29 RX ADMIN — CYANOCOBALAMIN 1000 MCG: 1000 INJECTION INTRAMUSCULAR; SUBCUTANEOUS at 10:45

## 2018-06-29 ASSESSMENT — ENCOUNTER SYMPTOMS: VOICE CHANGE: 1

## 2018-07-03 RX ORDER — LEVOTHYROXINE SODIUM 100 MCG
100 TABLET ORAL DAILY
Qty: 30 TABLET | Refills: 2 | Status: SHIPPED | OUTPATIENT
Start: 2018-07-03 | End: 2018-09-24 | Stop reason: SDUPTHER

## 2018-07-30 ENCOUNTER — NURSE ONLY (OUTPATIENT)
Dept: FAMILY MEDICINE CLINIC | Age: 43
End: 2018-07-30
Payer: MEDICAID

## 2018-07-30 DIAGNOSIS — E53.8 B12 DEFICIENCY: Primary | ICD-10-CM

## 2018-07-30 DIAGNOSIS — J30.9 ALLERGIC RHINITIS, UNSPECIFIED CHRONICITY, UNSPECIFIED SEASONALITY, UNSPECIFIED TRIGGER: ICD-10-CM

## 2018-07-30 PROCEDURE — 95115 IMMUNOTHERAPY ONE INJECTION: CPT | Performed by: FAMILY MEDICINE

## 2018-07-30 PROCEDURE — 96372 THER/PROPH/DIAG INJ SC/IM: CPT | Performed by: FAMILY MEDICINE

## 2018-07-30 RX ORDER — CYANOCOBALAMIN 1000 UG/ML
1000 INJECTION INTRAMUSCULAR; SUBCUTANEOUS ONCE
Status: COMPLETED | OUTPATIENT
Start: 2018-07-30 | End: 2018-07-30

## 2018-07-30 RX ADMIN — CYANOCOBALAMIN 1000 MCG: 1000 INJECTION INTRAMUSCULAR; SUBCUTANEOUS at 16:08

## 2018-08-02 ENCOUNTER — NURSE ONLY (OUTPATIENT)
Dept: FAMILY MEDICINE CLINIC | Age: 43
End: 2018-08-02
Payer: MEDICAID

## 2018-08-02 DIAGNOSIS — J30.9 ALLERGIC RHINITIS, UNSPECIFIED CHRONICITY, UNSPECIFIED SEASONALITY, UNSPECIFIED TRIGGER: Primary | ICD-10-CM

## 2018-08-02 PROCEDURE — 95115 IMMUNOTHERAPY ONE INJECTION: CPT | Performed by: FAMILY MEDICINE

## 2018-08-03 RX ORDER — OXYBUTYNIN CHLORIDE 10 MG/1
TABLET, EXTENDED RELEASE ORAL
Qty: 30 TABLET | Refills: 0 | Status: SHIPPED | OUTPATIENT
Start: 2018-08-03 | End: 2018-08-29 | Stop reason: SDUPTHER

## 2018-08-03 RX ORDER — FLUTICASONE PROPIONATE 50 MCG
SPRAY, SUSPENSION (ML) NASAL
Qty: 16 G | Refills: 0 | Status: SHIPPED | OUTPATIENT
Start: 2018-08-03 | End: 2018-08-29 | Stop reason: SDUPTHER

## 2018-08-03 RX ORDER — BUDESONIDE AND FORMOTEROL FUMARATE DIHYDRATE 160; 4.5 UG/1; UG/1
AEROSOL RESPIRATORY (INHALATION)
Qty: 10.2 G | Refills: 0 | Status: SHIPPED | OUTPATIENT
Start: 2018-08-03 | End: 2018-08-29 | Stop reason: SDUPTHER

## 2018-08-09 ENCOUNTER — NURSE ONLY (OUTPATIENT)
Dept: FAMILY MEDICINE CLINIC | Age: 43
End: 2018-08-09
Payer: MEDICAID

## 2018-08-09 DIAGNOSIS — J30.2 SEASONAL ALLERGIES: Primary | ICD-10-CM

## 2018-08-09 PROCEDURE — 95115 IMMUNOTHERAPY ONE INJECTION: CPT | Performed by: FAMILY MEDICINE

## 2018-08-09 NOTE — PROGRESS NOTES
Administrations This Visit     ALLERGEN EXTRACT 0.35 mL     Admin Date  08/09/2018 Action  Given Dose  0.35 mL Route  Subcutaneous Administered By  Surekha Alcaraz MA

## 2018-08-13 ENCOUNTER — NURSE ONLY (OUTPATIENT)
Dept: FAMILY MEDICINE CLINIC | Age: 43
End: 2018-08-13
Payer: MEDICAID

## 2018-08-13 DIAGNOSIS — J30.2 SEASONAL ALLERGIES: Primary | ICD-10-CM

## 2018-08-13 PROCEDURE — 95115 IMMUNOTHERAPY ONE INJECTION: CPT | Performed by: FAMILY MEDICINE

## 2018-08-13 NOTE — PROGRESS NOTES
Administrations This Visit     ALLERGEN EXTRACT 0.35 Units     Admin Date  08/13/2018  14:40 Action  Given Dose  0.35 Units Route  Subcutaneous Site  Arm Right Administered By  [address]    Ordering Provider:  Sarah Cai MD    Patient Supplied?:  Yes

## 2018-08-16 ENCOUNTER — NURSE ONLY (OUTPATIENT)
Dept: FAMILY MEDICINE CLINIC | Age: 43
End: 2018-08-16
Payer: MEDICAID

## 2018-08-16 DIAGNOSIS — J30.2 SEASONAL ALLERGIES: Primary | ICD-10-CM

## 2018-08-16 PROCEDURE — 95115 IMMUNOTHERAPY ONE INJECTION: CPT | Performed by: FAMILY MEDICINE

## 2018-08-16 NOTE — PROGRESS NOTES
Administrations This Visit     ALLERGEN EXTRACT 3.5 Units     Admin Date  08/16/2018  14:54 Action  Given Dose  3.5 Units Route  Subcutaneous Site  Arm Left Administered By  Royal Civil    Ordering Provider:  Delia Cuadra MD    Patient Supplied?:  Yes

## 2018-08-20 ENCOUNTER — NURSE ONLY (OUTPATIENT)
Dept: FAMILY MEDICINE CLINIC | Age: 43
End: 2018-08-20
Payer: MEDICAID

## 2018-08-20 DIAGNOSIS — J30.2 SEASONAL ALLERGIES: Primary | ICD-10-CM

## 2018-08-20 PROCEDURE — 95115 IMMUNOTHERAPY ONE INJECTION: CPT | Performed by: FAMILY MEDICINE

## 2018-08-27 ENCOUNTER — NURSE ONLY (OUTPATIENT)
Dept: FAMILY MEDICINE CLINIC | Age: 43
End: 2018-08-27
Payer: MEDICAID

## 2018-08-27 DIAGNOSIS — J30.2 SEASONAL ALLERGIES: Primary | ICD-10-CM

## 2018-08-27 PROCEDURE — 95115 IMMUNOTHERAPY ONE INJECTION: CPT | Performed by: FAMILY MEDICINE

## 2018-08-29 RX ORDER — BUDESONIDE AND FORMOTEROL FUMARATE DIHYDRATE 160; 4.5 UG/1; UG/1
AEROSOL RESPIRATORY (INHALATION)
Qty: 10.2 G | Refills: 2 | Status: SHIPPED | OUTPATIENT
Start: 2018-08-29 | End: 2018-11-28 | Stop reason: SDUPTHER

## 2018-08-29 RX ORDER — OXYBUTYNIN CHLORIDE 10 MG/1
TABLET, EXTENDED RELEASE ORAL
Qty: 30 TABLET | Refills: 2 | Status: SHIPPED | OUTPATIENT
Start: 2018-08-29 | End: 2018-11-28 | Stop reason: SDUPTHER

## 2018-08-29 RX ORDER — FLUTICASONE PROPIONATE 50 MCG
SPRAY, SUSPENSION (ML) NASAL
Qty: 16 G | Refills: 2 | Status: SHIPPED | OUTPATIENT
Start: 2018-08-29 | End: 2018-11-28 | Stop reason: SDUPTHER

## 2018-09-10 ENCOUNTER — NURSE ONLY (OUTPATIENT)
Dept: FAMILY MEDICINE CLINIC | Age: 43
End: 2018-09-10
Payer: MEDICAID

## 2018-09-10 DIAGNOSIS — E53.8 B12 DEFICIENCY: Primary | ICD-10-CM

## 2018-09-10 DIAGNOSIS — J30.2 SEASONAL ALLERGIES: ICD-10-CM

## 2018-09-10 PROCEDURE — 95115 IMMUNOTHERAPY ONE INJECTION: CPT | Performed by: FAMILY MEDICINE

## 2018-09-10 PROCEDURE — 96372 THER/PROPH/DIAG INJ SC/IM: CPT | Performed by: FAMILY MEDICINE

## 2018-09-10 RX ORDER — CYANOCOBALAMIN 1000 UG/ML
1000 INJECTION INTRAMUSCULAR; SUBCUTANEOUS ONCE
Status: COMPLETED | OUTPATIENT
Start: 2018-09-10 | End: 2018-09-10

## 2018-09-10 RX ADMIN — CYANOCOBALAMIN 1000 MCG: 1000 INJECTION INTRAMUSCULAR; SUBCUTANEOUS at 13:54

## 2018-09-17 ENCOUNTER — NURSE ONLY (OUTPATIENT)
Dept: FAMILY MEDICINE CLINIC | Age: 43
End: 2018-09-17
Payer: MEDICAID

## 2018-09-17 DIAGNOSIS — J30.9 ALLERGIC RHINITIS, UNSPECIFIED SEASONALITY, UNSPECIFIED TRIGGER: Primary | ICD-10-CM

## 2018-09-17 PROCEDURE — 95115 IMMUNOTHERAPY ONE INJECTION: CPT | Performed by: FAMILY MEDICINE

## 2018-09-17 NOTE — PROGRESS NOTES
Administrations This Visit     ALLERGEN EXTRACT 0.35 mL     Admin Date  09/17/2018 Action  Given Dose  0.35 mL Route  Subcutaneous Administered By  Karon Lagos

## 2018-09-24 ENCOUNTER — OFFICE VISIT (OUTPATIENT)
Dept: FAMILY MEDICINE CLINIC | Age: 43
End: 2018-09-24
Payer: MEDICAID

## 2018-09-24 ENCOUNTER — HOSPITAL ENCOUNTER (OUTPATIENT)
Facility: HOSPITAL | Age: 43
Discharge: HOME | End: 2018-09-24
Payer: MEDICAID

## 2018-09-24 VITALS
HEART RATE: 78 BPM | DIASTOLIC BLOOD PRESSURE: 72 MMHG | SYSTOLIC BLOOD PRESSURE: 112 MMHG | WEIGHT: 195.6 LBS | OXYGEN SATURATION: 98 % | HEIGHT: 61 IN | RESPIRATION RATE: 20 BRPM | BODY MASS INDEX: 36.93 KG/M2

## 2018-09-24 DIAGNOSIS — E03.9 HYPOTHYROIDISM, UNSPECIFIED TYPE: ICD-10-CM

## 2018-09-24 DIAGNOSIS — N39.0 URINARY TRACT INFECTION WITHOUT HEMATURIA, SITE UNSPECIFIED: Primary | ICD-10-CM

## 2018-09-24 DIAGNOSIS — R31.1 BENIGN ESSENTIAL MICROSCOPIC HEMATURIA: ICD-10-CM

## 2018-09-24 DIAGNOSIS — E86.0 DEHYDRATION: ICD-10-CM

## 2018-09-24 DIAGNOSIS — F41.9 ANXIETY: ICD-10-CM

## 2018-09-24 DIAGNOSIS — R63.5 WEIGHT GAIN: ICD-10-CM

## 2018-09-24 LAB
A/G RATIO: 1.6 (ref 0.8–2)
ALBUMIN SERPL-MCNC: 3.9 G/DL (ref 3.4–4.8)
ALP BLD-CCNC: 104 U/L (ref 25–100)
ALT SERPL-CCNC: 7 U/L (ref 4–36)
ANION GAP SERPL CALCULATED.3IONS-SCNC: 13 MMOL/L (ref 3–16)
AST SERPL-CCNC: 16 U/L (ref 8–33)
BILIRUB SERPL-MCNC: 0.3 MG/DL (ref 0.3–1.2)
BILIRUBIN, POC: NORMAL
BLOOD URINE, POC: NORMAL
BUN BLDV-MCNC: 11 MG/DL (ref 6–20)
CALCIUM SERPL-MCNC: 8.9 MG/DL (ref 8.5–10.5)
CHLORIDE BLD-SCNC: 106 MMOL/L (ref 98–107)
CLARITY, POC: CLEAR
CO2: 24 MMOL/L (ref 20–30)
COLOR, POC: YELLOW
CREAT SERPL-MCNC: 0.7 MG/DL (ref 0.4–1.2)
GFR AFRICAN AMERICAN: >59
GFR NON-AFRICAN AMERICAN: >60
GLOBULIN: 2.4 G/DL
GLUCOSE BLD-MCNC: 116 MG/DL (ref 74–106)
GLUCOSE URINE, POC: NORMAL
HCT VFR BLD CALC: 37.2 % (ref 37–47)
HEMOGLOBIN: 11.5 G/DL (ref 11.5–16.5)
KETONES, POC: NORMAL
LEUKOCYTE EST, POC: NORMAL
MCH RBC QN AUTO: 27.4 PG (ref 27–32)
MCHC RBC AUTO-ENTMCNC: 30.9 G/DL (ref 31–35)
MCV RBC AUTO: 88.8 FL (ref 80–100)
NITRITE, POC: POSITIVE
PDW BLD-RTO: 14.1 % (ref 11–16)
PH, POC: 5.5
PLATELET # BLD: 296 K/UL (ref 150–400)
PMV BLD AUTO: 10.8 FL (ref 6–10)
POTASSIUM SERPL-SCNC: 4.3 MMOL/L (ref 3.4–5.1)
PROTEIN, POC: NORMAL
RBC # BLD: 4.19 M/UL (ref 3.8–5.8)
SODIUM BLD-SCNC: 143 MMOL/L (ref 136–145)
SPECIFIC GRAVITY, POC: 1.03
T4 FREE: 3.43 NG/DL (ref 0.89–1.76)
TOTAL PROTEIN: 6.3 G/DL (ref 6.4–8.3)
TSH SERPL DL<=0.05 MIU/L-ACNC: 2.77 UIU/ML (ref 0.35–5.5)
UROBILINOGEN, POC: 1
WBC # BLD: 6.1 K/UL (ref 4–11)

## 2018-09-24 PROCEDURE — 84439 ASSAY OF FREE THYROXINE: CPT

## 2018-09-24 PROCEDURE — 81002 URINALYSIS NONAUTO W/O SCOPE: CPT | Performed by: FAMILY MEDICINE

## 2018-09-24 PROCEDURE — 85027 COMPLETE CBC AUTOMATED: CPT

## 2018-09-24 PROCEDURE — 36415 COLL VENOUS BLD VENIPUNCTURE: CPT

## 2018-09-24 PROCEDURE — 1036F TOBACCO NON-USER: CPT | Performed by: FAMILY MEDICINE

## 2018-09-24 PROCEDURE — 80053 COMPREHEN METABOLIC PANEL: CPT

## 2018-09-24 PROCEDURE — G8417 CALC BMI ABV UP PARAM F/U: HCPCS | Performed by: FAMILY MEDICINE

## 2018-09-24 PROCEDURE — 99214 OFFICE O/P EST MOD 30 MIN: CPT | Performed by: FAMILY MEDICINE

## 2018-09-24 PROCEDURE — G8427 DOCREV CUR MEDS BY ELIG CLIN: HCPCS | Performed by: FAMILY MEDICINE

## 2018-09-24 PROCEDURE — 84443 ASSAY THYROID STIM HORMONE: CPT

## 2018-09-24 RX ORDER — CIPROFLOXACIN 500 MG/1
500 TABLET, FILM COATED ORAL 2 TIMES DAILY
Qty: 10 TABLET | Refills: 0 | Status: SHIPPED | OUTPATIENT
Start: 2018-09-24 | End: 2018-09-29

## 2018-09-24 RX ORDER — LEVOTHYROXINE SODIUM 100 MCG
100 TABLET ORAL DAILY
Qty: 30 TABLET | Refills: 2 | Status: SHIPPED | OUTPATIENT
Start: 2018-09-24 | End: 2018-12-26 | Stop reason: SDUPTHER

## 2018-09-24 RX ORDER — PHENTERMINE HYDROCHLORIDE 37.5 MG/1
37.5 TABLET ORAL
Qty: 30 TABLET | Refills: 2 | Status: SHIPPED | OUTPATIENT
Start: 2018-09-24 | End: 2019-04-29 | Stop reason: SDUPTHER

## 2018-09-24 RX ORDER — FLUCONAZOLE 150 MG/1
150 TABLET ORAL ONCE
Qty: 2 TABLET | Refills: 0 | Status: SHIPPED | OUTPATIENT
Start: 2018-09-24 | End: 2018-09-24

## 2018-09-24 RX ORDER — CLONAZEPAM 0.5 MG/1
TABLET ORAL
Qty: 45 TABLET | Refills: 2 | Status: SHIPPED | OUTPATIENT
Start: 2018-09-24 | End: 2019-01-24 | Stop reason: SDUPTHER

## 2018-09-24 ASSESSMENT — ENCOUNTER SYMPTOMS: VOICE CHANGE: 1

## 2018-09-24 NOTE — PROGRESS NOTES
Have you seen any other physician or provider since your last visit no    Have you had any other diagnostic tests since your last visit? no    Have you changed or stopped any medications since your last visit? no
10.5 mg/dL Not in Time Range    Chloride 106 (9/24/2018) 98 - 107 mmol/L Not in Time Range    CO2 24 (9/24/2018) 20 - 30 mmol/L Not in Time Range    CREATININE 0.7 (9/24/2018) 0.4 - 1.2 mg/dL Not in Time Range    GFR  >59 (9/24/2018) >59 Not in Time Range    GFR Non- >60 (9/24/2018) >59 Not in Time Range    Globulin 2.4 (9/24/2018) g/dL Not in Time Range    Glucose 116 (H) (9/24/2018) 74 - 106 mg/dL Not in Time Range    Potassium 4.3 (9/24/2018) 3.4 - 5.1 mmol/L Not in Time Range    Sodium 143 (9/24/2018) 136 - 145 mmol/L Not in Time Range    Total Bilirubin 0.3 (9/24/2018) 0.3 - 1.2 mg/dL Not in Time Range    Total Protein 6.3 (L) (9/24/2018) 6.4 - 8.3 g/dL Not in Time Range        Hemoglobin A1C (%)   Date Value   07/17/2017 5.5     LDL Calculated (mg/dL)   Date Value   08/01/2016 63         Lab Results   Component Value Date    WBC 6.1 09/24/2018    NEUTROABS 5.0 04/24/2017    HGB 11.5 09/24/2018    HCT 37.2 09/24/2018    MCV 88.8 09/24/2018     09/24/2018       Lab Results   Component Value Date    TSH 2.77 09/24/2018         ASSESSMENT:    Diagnosis Orders   1. Urinary tract infection without hematuria, site unspecified  COMPREHENSIVE METABOLIC PANEL    CBC   2. Weight gain  phentermine (ADIPEX-P) 37.5 MG tablet   3. Anxiety  clonazePAM (KLONOPIN) 0.5 MG tablet   4. Hypothyroidism, unspecified type  TSH without Reflex    T4, FREE   5. Dehydration          PLAN:  Orders Placed This Encounter   Medications    phentermine (ADIPEX-P) 37.5 MG tablet     Sig: Take 1 tablet by mouth every morning (before breakfast) for 30 days. .     Dispense:  30 tablet     Refill:  2    clonazePAM (KLONOPIN) 0.5 MG tablet     Sig: TAKE 1/2 TABLET EVERY MORNING AND 1 TABLET IN EVENING. Dispense:  45 tablet     Refill:  2    SYNTHROID 100 MCG tablet     Sig: Take 1 tablet by mouth daily Take with water on an empty stomach- wait 30 minutes before eating or taking other meds.      Dispense:

## 2018-09-24 NOTE — LETTER
coughing, these are especially dangerous for patients with lung disease. The medications may also lower testosterone, loss of bone strength, stamina and sex drive. For opiate medications, women who are of child bearing age 14-53 who could become pregnant should weigh the risks carefully with their physician as these medications make pregnancy more risky and the baby could be born sick and or addicted and have complications. For opiate medications, its possible to have dangerous interactions with alcohol and other medications. You may have an increase in pain called hyperalgesia, opioid medications can affect the brain and nerves that may cause increased pain and you may have trouble getting pain relief. To reduce the risks of harm to patients, we work to address lowering pain medication and improving your function. Dependence withdrawal symptoms may include; feelings of uneasiness, increased pain, irritability, belly pain, diarrhea, sweats and goose-flesh. 1. I understand that I have the following responsibilities:    ? I will take medications at the dose and frequency prescribed. ? I will not increase or change how I take my medications without the approval of the health care provider who signs this Medication Agreement. ? I will arrange for refills at the prescribed interval ONLY during regular office hours. I will not ask for refills earlier than agreed, after-hours, on holidays or on weekends. ? I will obtain all refills for medications [facility] [address] [facility] , with full consent for my provider and pharmacist to exchange information in writing or verbally. ? I will not request any pain medications or controlled substances from other providers and will inform this provider of all other medications I am taking. ? I will inform my other health care providers that I am taking these medications and of the existence of this [id number].  In the or inappropriate drug screen results. I understand if contacted for a random count it is my responsibility to bring all medications listed on this MEDICATION AGREEMENT to the providers office at time of count. 6. I understand that this provider may stop prescribing the medications listed if:    ? I do not show any improvement in pain or my activity has not improved    ? I develop rapid tolerance or loss of improvement as described in my treatment plan    ? I develop significant side effects from the medication    ? My behavior is inconsistent with the responsibilities outlined above, which may also result in being prevented from receiving further care from this office. AGREEMENT: I have read the above and have had all my questions answered. For chronic pain management, I know that chronic pain can be managed with many types of treatments. A chronic opioid trial may be part of my treatment but I must be an active participant in my care. Opioid medication is only one part of my pain management. There is limited scientific data to suggest that using opioids over 4 months will lower my pain and or improve my daily function. There is some scientific information that suggests using chronic opioids can increase my pain, make me feel less well, and increase my risk of unintentional death directly related to the opioid medication. I know that if my provider feels my risk from controlled medications and or opioid therapy is greater than my benefit, I will have my controlled substance medications and or opioid medication compassionately lowered or removed altogether. I agree to a controlled substance medication or chronic opioid trial.     I further agree to allow this office to contact family or friends if there are concerns about my safety and use of the controlled medications.     I [address] have agreed to use the medications listed on this

## 2018-10-03 RX ORDER — ERGOCALCIFEROL 1.25 MG/1
CAPSULE ORAL
Qty: 4 CAPSULE | Refills: 0 | Status: SHIPPED | OUTPATIENT
Start: 2018-10-03 | End: 2018-10-29 | Stop reason: SDUPTHER

## 2018-10-04 ENCOUNTER — NURSE ONLY (OUTPATIENT)
Dept: FAMILY MEDICINE CLINIC | Age: 43
End: 2018-10-04
Payer: MEDICAID

## 2018-10-04 DIAGNOSIS — J30.2 SEASONAL ALLERGIES: Primary | ICD-10-CM

## 2018-10-04 PROCEDURE — 95115 IMMUNOTHERAPY ONE INJECTION: CPT | Performed by: FAMILY MEDICINE

## 2018-10-04 NOTE — PROGRESS NOTES
Administrations This Visit     ALLERGEN EXTRACT 0.35 mL     Admin Date  10/04/2018  15:46 Action  Given Dose  0.35 mL Route  Subcutaneous Site  Arm Right Administered By  Lloyd Alvarez MA    Ordering Provider:  Syed Mcgee MD    Patient Supplied?:  Yes                  I spoke with patient about being on time for her allergy shots. Patient states she is having transportation issues. I advised her that she would have to follow up with her allergist if she needs to change her dosing schedule. I also spoke with Yon Cain about this.  S 10/4/18 3:45pm

## 2018-10-09 ENCOUNTER — NURSE ONLY (OUTPATIENT)
Dept: FAMILY MEDICINE CLINIC | Age: 43
End: 2018-10-09
Payer: MEDICAID

## 2018-10-09 DIAGNOSIS — J30.2 SEASONAL ALLERGIES: ICD-10-CM

## 2018-10-09 DIAGNOSIS — E53.8 B12 DEFICIENCY: Primary | ICD-10-CM

## 2018-10-09 PROCEDURE — 96372 THER/PROPH/DIAG INJ SC/IM: CPT | Performed by: FAMILY MEDICINE

## 2018-10-09 PROCEDURE — 95115 IMMUNOTHERAPY ONE INJECTION: CPT | Performed by: FAMILY MEDICINE

## 2018-10-09 RX ORDER — CYANOCOBALAMIN 1000 UG/ML
1000 INJECTION INTRAMUSCULAR; SUBCUTANEOUS ONCE
Status: COMPLETED | OUTPATIENT
Start: 2018-10-09 | End: 2018-10-09

## 2018-10-09 RX ADMIN — CYANOCOBALAMIN 1000 MCG: 1000 INJECTION INTRAMUSCULAR; SUBCUTANEOUS at 17:50

## 2018-10-15 ENCOUNTER — NURSE ONLY (OUTPATIENT)
Dept: FAMILY MEDICINE CLINIC | Age: 43
End: 2018-10-15
Payer: MEDICAID

## 2018-10-15 DIAGNOSIS — J30.2 SEASONAL ALLERGIES: Primary | ICD-10-CM

## 2018-10-15 PROCEDURE — 95115 IMMUNOTHERAPY ONE INJECTION: CPT | Performed by: FAMILY MEDICINE

## 2018-10-15 NOTE — PROGRESS NOTES
Administrations This Visit     ALLERGEN EXTRACT 0.35 mL     Admin Date  10/15/2018  15:20 Action  Given Dose  0.35 mL Route  Subcutaneous Site  Arm Right Administered By  Beverly Lamb MA    Ordering Provider:  Olga Watts MD    Patient Supplied?:  Yes

## 2018-10-25 ENCOUNTER — OFFICE VISIT (OUTPATIENT)
Dept: FAMILY MEDICINE CLINIC | Age: 43
End: 2018-10-25
Payer: MEDICAID

## 2018-10-25 VITALS
HEIGHT: 61 IN | WEIGHT: 195 LBS | HEART RATE: 99 BPM | BODY MASS INDEX: 36.82 KG/M2 | OXYGEN SATURATION: 99 % | RESPIRATION RATE: 18 BRPM | DIASTOLIC BLOOD PRESSURE: 68 MMHG | SYSTOLIC BLOOD PRESSURE: 118 MMHG

## 2018-10-25 DIAGNOSIS — F32.A DEPRESSION, UNSPECIFIED DEPRESSION TYPE: ICD-10-CM

## 2018-10-25 DIAGNOSIS — Z87.440 RECENT URINARY TRACT INFECTION: Primary | ICD-10-CM

## 2018-10-25 DIAGNOSIS — R53.83 OTHER FATIGUE: ICD-10-CM

## 2018-10-25 PROCEDURE — 1036F TOBACCO NON-USER: CPT | Performed by: FAMILY MEDICINE

## 2018-10-25 PROCEDURE — G8484 FLU IMMUNIZE NO ADMIN: HCPCS | Performed by: FAMILY MEDICINE

## 2018-10-25 PROCEDURE — 99213 OFFICE O/P EST LOW 20 MIN: CPT | Performed by: FAMILY MEDICINE

## 2018-10-25 PROCEDURE — G8427 DOCREV CUR MEDS BY ELIG CLIN: HCPCS | Performed by: FAMILY MEDICINE

## 2018-10-25 PROCEDURE — G8417 CALC BMI ABV UP PARAM F/U: HCPCS | Performed by: FAMILY MEDICINE

## 2018-10-25 ASSESSMENT — ENCOUNTER SYMPTOMS: VOICE CHANGE: 1

## 2018-10-25 NOTE — PROGRESS NOTES
SUBJECTIVE:    Patient ID: Estefani Curiel is a 41 y.o. female. Chief Complaint   Patient presents with    Urinary Tract Infection     follow up       HPI:she is here in follow up of the uti. She says she feels like she is osme better. she finished all the medication. She has been trying to drink more fluids. She is struggling to drink much water. She is not ahving any fever. She denies any flank pain. She does complain of continued weakness and fatigue. She denies any chest pain or increas in sob. She continues to deal with some depressive symptoms due to some social issues. She denies any other medication problems    Review of Systems   HENT: Positive for voice change. Musculoskeletal: Positive for gait problem. Psychiatric/Behavioral: The patient is nervous/anxious. All other systems reviewed and are negative. OBJECTIVE:  Wt Readings from Last 3 Encounters:   10/25/18 195 lb (88.5 kg)   09/24/18 195 lb 9.6 oz (88.7 kg)   06/29/18 199 lb (90.3 kg)     BP Readings from Last 3 Encounters:   10/25/18 118/68   09/24/18 112/72   06/29/18 122/70      Pulse Readings from Last 3 Encounters:   10/25/18 99   09/24/18 78   06/29/18 85     Body mass index is 36.84 kg/m². Resp Readings from Last 3 Encounters:   10/25/18 18   09/24/18 20   04/16/18 20     Physical Exam   Constitutional: She is oriented to person, place, and time. Vital signs are normal. She appears well-developed and well-nourished. HENT:   Head: Normocephalic and atraumatic. Right Ear: Hearing and external ear normal.   Left Ear: Hearing and external ear normal.   Nose: Nose normal.   Mouth/Throat: Uvula is midline, oropharynx is clear and moist and mucous membranes are normal.   Eyes: Pupils are equal, round, and reactive to light. Conjunctivae, EOM and lids are normal.   Neck: Trachea normal, normal range of motion and phonation normal. Neck supple. Carotid bruit is not present. No thyroid mass and no thyromegaly present. Cardiovascular: Normal rate, regular rhythm, S1 normal, S2 normal, normal heart sounds, intact distal pulses and normal pulses. Exam reveals no gallop and no friction rub. No murmur heard. Pulmonary/Chest: Effort normal and breath sounds normal.   Abdominal: Normal appearance. Genitourinary: Rectum normal. Pelvic exam was performed with patient supine. No labial fusion. There is no rash, tenderness, lesion or injury on the right labia. There is no rash, tenderness, lesion or injury on the left labia. No erythema, tenderness or bleeding in the vagina. No foreign body in the vagina. No signs of injury around the vagina. No vaginal discharge found. Genitourinary Comments: Status post hysterectomy     Musculoskeletal:        Right knee: She exhibits decreased range of motion. Tenderness found. Medial joint line tenderness noted. Lumbar back: She exhibits decreased range of motion, tenderness, pain and spasm. Right si joint tenderness    Neurological: She is alert and oriented to person, place, and time. Skin: Skin is warm and dry. Psychiatric: She has a normal mood and affect. Nursing note and vitals reviewed. No results found for requested labs within last 30 days. Hemoglobin A1C (%)   Date Value   07/17/2017 5.5     LDL Calculated (mg/dL)   Date Value   08/01/2016 63         Lab Results   Component Value Date    WBC 6.1 09/24/2018    NEUTROABS 5.0 04/24/2017    HGB 11.5 09/24/2018    HCT 37.2 09/24/2018    MCV 88.8 09/24/2018     09/24/2018       Lab Results   Component Value Date    TSH 2.77 09/24/2018         ASSESSMENT:    Diagnosis Orders   1. Recent urinary tract infection     2. Depression, unspecified depression type     3. Other fatigue          PLAN:  No orders of the defined types were placed in this encounter. There are no discontinued medications.     Controlled Substances Monitoring:

## 2018-10-29 RX ORDER — ERGOCALCIFEROL 1.25 MG/1
CAPSULE ORAL
Qty: 4 CAPSULE | Refills: 0 | Status: SHIPPED | OUTPATIENT
Start: 2018-10-29 | End: 2018-11-28 | Stop reason: SDUPTHER

## 2018-11-05 PROBLEM — R53.83 OTHER FATIGUE: Status: ACTIVE | Noted: 2018-11-05

## 2018-11-12 ENCOUNTER — NURSE ONLY (OUTPATIENT)
Dept: FAMILY MEDICINE CLINIC | Age: 43
End: 2018-11-12
Payer: MEDICAID

## 2018-11-12 DIAGNOSIS — E53.8 B12 DEFICIENCY: Primary | ICD-10-CM

## 2018-11-12 DIAGNOSIS — J30.9 ALLERGIC RHINITIS, UNSPECIFIED SEASONALITY, UNSPECIFIED TRIGGER: ICD-10-CM

## 2018-11-12 PROCEDURE — 96372 THER/PROPH/DIAG INJ SC/IM: CPT | Performed by: FAMILY MEDICINE

## 2018-11-12 PROCEDURE — 95115 IMMUNOTHERAPY ONE INJECTION: CPT | Performed by: FAMILY MEDICINE

## 2018-11-12 RX ORDER — CYANOCOBALAMIN 1000 UG/ML
1000 INJECTION INTRAMUSCULAR; SUBCUTANEOUS ONCE
Status: COMPLETED | OUTPATIENT
Start: 2018-11-12 | End: 2018-11-12

## 2018-11-12 RX ADMIN — CYANOCOBALAMIN 1000 MCG: 1000 INJECTION INTRAMUSCULAR; SUBCUTANEOUS at 14:27

## 2018-11-15 ENCOUNTER — NURSE ONLY (OUTPATIENT)
Dept: FAMILY MEDICINE CLINIC | Age: 43
End: 2018-11-15
Payer: MEDICAID

## 2018-11-15 DIAGNOSIS — J30.2 SEASONAL ALLERGIES: Primary | ICD-10-CM

## 2018-11-15 PROCEDURE — 95115 IMMUNOTHERAPY ONE INJECTION: CPT | Performed by: FAMILY MEDICINE

## 2018-11-19 ENCOUNTER — NURSE ONLY (OUTPATIENT)
Dept: FAMILY MEDICINE CLINIC | Age: 43
End: 2018-11-19
Payer: MEDICAID

## 2018-11-19 DIAGNOSIS — J30.2 SEASONAL ALLERGIES: Primary | ICD-10-CM

## 2018-11-19 PROCEDURE — 95115 IMMUNOTHERAPY ONE INJECTION: CPT | Performed by: FAMILY MEDICINE

## 2018-11-19 NOTE — PROGRESS NOTES
Administrations This Visit     ALLERGEN EXTRACT 0.15 mL     Admin Date  11/19/2018  16:42 Action  Given Dose  0.15 mL Route  Subcutaneous Site  Arm Right Administered By  Rain Toney RN    Ordering Provider:  Daja Ferrera MD    Patient Supplied?:  Yes            Patient supplied medication

## 2018-11-26 ENCOUNTER — NURSE ONLY (OUTPATIENT)
Dept: FAMILY MEDICINE CLINIC | Age: 43
End: 2018-11-26

## 2018-11-26 DIAGNOSIS — J30.9 ALLERGIC RHINITIS, UNSPECIFIED SEASONALITY, UNSPECIFIED TRIGGER: Primary | ICD-10-CM

## 2018-11-28 RX ORDER — ERGOCALCIFEROL 1.25 MG/1
CAPSULE ORAL
Qty: 4 CAPSULE | Refills: 0 | Status: SHIPPED | OUTPATIENT
Start: 2018-11-28 | End: 2018-12-26 | Stop reason: SDUPTHER

## 2018-11-28 RX ORDER — BUDESONIDE AND FORMOTEROL FUMARATE DIHYDRATE 160; 4.5 UG/1; UG/1
AEROSOL RESPIRATORY (INHALATION)
Qty: 10.2 G | Refills: 5 | Status: SHIPPED | OUTPATIENT
Start: 2018-11-28 | End: 2019-05-23 | Stop reason: SDUPTHER

## 2018-11-28 RX ORDER — FLUTICASONE PROPIONATE 50 MCG
SPRAY, SUSPENSION (ML) NASAL
Qty: 16 G | Refills: 5 | Status: SHIPPED | OUTPATIENT
Start: 2018-11-28 | End: 2019-05-23

## 2018-11-28 RX ORDER — OXYBUTYNIN CHLORIDE 10 MG/1
TABLET, EXTENDED RELEASE ORAL
Qty: 30 TABLET | Refills: 5 | Status: SHIPPED | OUTPATIENT
Start: 2018-11-28 | End: 2019-05-23

## 2018-12-03 ENCOUNTER — NURSE ONLY (OUTPATIENT)
Dept: FAMILY MEDICINE CLINIC | Age: 43
End: 2018-12-03
Payer: MEDICAID

## 2018-12-03 DIAGNOSIS — J30.2 SEASONAL ALLERGIES: Primary | ICD-10-CM

## 2018-12-03 PROCEDURE — 95115 IMMUNOTHERAPY ONE INJECTION: CPT | Performed by: FAMILY MEDICINE

## 2018-12-10 ENCOUNTER — NURSE ONLY (OUTPATIENT)
Dept: FAMILY MEDICINE CLINIC | Age: 43
End: 2018-12-10

## 2018-12-10 DIAGNOSIS — J30.9 ALLERGIC RHINITIS, UNSPECIFIED SEASONALITY, UNSPECIFIED TRIGGER: Primary | ICD-10-CM

## 2018-12-17 ENCOUNTER — NURSE ONLY (OUTPATIENT)
Dept: FAMILY MEDICINE CLINIC | Age: 43
End: 2018-12-17
Payer: MEDICAID

## 2018-12-17 DIAGNOSIS — J30.2 SEASONAL ALLERGIES: ICD-10-CM

## 2018-12-17 DIAGNOSIS — R53.83 OTHER FATIGUE: Primary | ICD-10-CM

## 2018-12-17 PROCEDURE — 96372 THER/PROPH/DIAG INJ SC/IM: CPT | Performed by: FAMILY MEDICINE

## 2018-12-17 PROCEDURE — 95115 IMMUNOTHERAPY ONE INJECTION: CPT | Performed by: FAMILY MEDICINE

## 2018-12-17 RX ORDER — CYANOCOBALAMIN 1000 UG/ML
1000 INJECTION INTRAMUSCULAR; SUBCUTANEOUS ONCE
Status: COMPLETED | OUTPATIENT
Start: 2018-12-17 | End: 2018-12-17

## 2018-12-17 RX ADMIN — CYANOCOBALAMIN 1000 MCG: 1000 INJECTION INTRAMUSCULAR; SUBCUTANEOUS at 16:30

## 2018-12-26 RX ORDER — LEVOCETIRIZINE DIHYDROCHLORIDE 5 MG/1
TABLET, FILM COATED ORAL
Qty: 30 TABLET | Refills: 5 | Status: SHIPPED | OUTPATIENT
Start: 2018-12-26 | End: 2019-06-21

## 2018-12-26 RX ORDER — ERGOCALCIFEROL 1.25 MG/1
CAPSULE ORAL
Qty: 4 CAPSULE | Refills: 0 | Status: SHIPPED | OUTPATIENT
Start: 2018-12-26 | End: 2019-01-24 | Stop reason: SDUPTHER

## 2018-12-26 RX ORDER — DEXLANSOPRAZOLE 60 MG/1
CAPSULE, DELAYED RELEASE ORAL
Qty: 30 CAPSULE | Refills: 5 | Status: SHIPPED | OUTPATIENT
Start: 2018-12-26 | End: 2019-04-29 | Stop reason: SDUPTHER

## 2018-12-26 RX ORDER — MONTELUKAST SODIUM 10 MG/1
TABLET ORAL
Qty: 30 TABLET | Refills: 5 | Status: SHIPPED | OUTPATIENT
Start: 2018-12-26 | End: 2019-06-21 | Stop reason: SDUPTHER

## 2018-12-26 RX ORDER — FAMOTIDINE 40 MG/1
TABLET, FILM COATED ORAL
Qty: 60 TABLET | Refills: 5 | Status: SHIPPED | OUTPATIENT
Start: 2018-12-26 | End: 2019-04-29 | Stop reason: SDUPTHER

## 2018-12-26 RX ORDER — LEVOTHYROXINE SODIUM 0.1 MG/1
TABLET ORAL
Qty: 30 TABLET | Refills: 2 | Status: SHIPPED | OUTPATIENT
Start: 2018-12-26 | End: 2019-03-21 | Stop reason: SDUPTHER

## 2018-12-31 ENCOUNTER — NURSE ONLY (OUTPATIENT)
Dept: FAMILY MEDICINE CLINIC | Age: 43
End: 2018-12-31
Payer: MEDICAID

## 2018-12-31 DIAGNOSIS — J30.2 SEASONAL ALLERGIES: Primary | ICD-10-CM

## 2018-12-31 PROCEDURE — 95115 IMMUNOTHERAPY ONE INJECTION: CPT | Performed by: FAMILY MEDICINE

## 2019-01-07 ENCOUNTER — NURSE ONLY (OUTPATIENT)
Dept: FAMILY MEDICINE CLINIC | Age: 44
End: 2019-01-07
Payer: MEDICAID

## 2019-01-07 DIAGNOSIS — J30.2 SEASONAL ALLERGIES: Primary | ICD-10-CM

## 2019-01-07 PROCEDURE — 95115 IMMUNOTHERAPY ONE INJECTION: CPT | Performed by: FAMILY MEDICINE

## 2019-01-16 ENCOUNTER — NURSE ONLY (OUTPATIENT)
Dept: FAMILY MEDICINE CLINIC | Age: 44
End: 2019-01-16
Payer: MEDICAID

## 2019-01-16 DIAGNOSIS — E53.8 B12 DEFICIENCY: ICD-10-CM

## 2019-01-16 DIAGNOSIS — J30.2 SEASONAL ALLERGIES: Primary | ICD-10-CM

## 2019-01-16 PROCEDURE — 96372 THER/PROPH/DIAG INJ SC/IM: CPT | Performed by: FAMILY MEDICINE

## 2019-01-16 PROCEDURE — 95115 IMMUNOTHERAPY ONE INJECTION: CPT | Performed by: FAMILY MEDICINE

## 2019-01-16 RX ORDER — CYANOCOBALAMIN 1000 UG/ML
1000 INJECTION INTRAMUSCULAR; SUBCUTANEOUS ONCE
Status: COMPLETED | OUTPATIENT
Start: 2019-01-16 | End: 2019-01-16

## 2019-01-16 RX ADMIN — CYANOCOBALAMIN 1000 MCG: 1000 INJECTION INTRAMUSCULAR; SUBCUTANEOUS at 16:02

## 2019-01-22 ENCOUNTER — OFFICE VISIT (OUTPATIENT)
Dept: FAMILY MEDICINE CLINIC | Age: 44
End: 2019-01-22
Payer: MEDICAID

## 2019-01-22 VITALS
RESPIRATION RATE: 18 BRPM | DIASTOLIC BLOOD PRESSURE: 80 MMHG | BODY MASS INDEX: 36.63 KG/M2 | HEART RATE: 88 BPM | SYSTOLIC BLOOD PRESSURE: 132 MMHG | WEIGHT: 194 LBS | OXYGEN SATURATION: 98 % | HEIGHT: 61 IN

## 2019-01-22 DIAGNOSIS — G57.52 TARSAL TUNNEL SYNDROME OF LEFT SIDE: ICD-10-CM

## 2019-01-22 DIAGNOSIS — Z01.818 PREOP EXAMINATION: Primary | ICD-10-CM

## 2019-01-22 DIAGNOSIS — J30.2 SEASONAL ALLERGIES: ICD-10-CM

## 2019-01-22 PROCEDURE — G8417 CALC BMI ABV UP PARAM F/U: HCPCS | Performed by: FAMILY MEDICINE

## 2019-01-22 PROCEDURE — G8484 FLU IMMUNIZE NO ADMIN: HCPCS | Performed by: FAMILY MEDICINE

## 2019-01-22 PROCEDURE — 1036F TOBACCO NON-USER: CPT | Performed by: FAMILY MEDICINE

## 2019-01-22 PROCEDURE — 99214 OFFICE O/P EST MOD 30 MIN: CPT | Performed by: FAMILY MEDICINE

## 2019-01-22 PROCEDURE — G8427 DOCREV CUR MEDS BY ELIG CLIN: HCPCS | Performed by: FAMILY MEDICINE

## 2019-01-22 ASSESSMENT — ENCOUNTER SYMPTOMS: VOICE CHANGE: 1

## 2019-01-24 DIAGNOSIS — F41.9 ANXIETY: ICD-10-CM

## 2019-01-24 RX ORDER — BUPROPION HYDROCHLORIDE 300 MG/1
TABLET ORAL
Qty: 30 TABLET | Refills: 0 | Status: SHIPPED | OUTPATIENT
Start: 2019-01-24 | End: 2019-02-21 | Stop reason: SDUPTHER

## 2019-01-24 RX ORDER — CLONAZEPAM 0.5 MG/1
TABLET ORAL
Qty: 45 TABLET | Refills: 0 | Status: SHIPPED | OUTPATIENT
Start: 2019-01-24 | End: 2019-02-21 | Stop reason: SDUPTHER

## 2019-01-24 RX ORDER — ERGOCALCIFEROL 1.25 MG/1
CAPSULE ORAL
Qty: 4 CAPSULE | Refills: 0 | Status: SHIPPED | OUTPATIENT
Start: 2019-01-24 | End: 2019-02-21 | Stop reason: SDUPTHER

## 2019-01-24 RX ORDER — FUROSEMIDE 20 MG/1
TABLET ORAL
Qty: 30 TABLET | Refills: 0 | Status: SHIPPED | OUTPATIENT
Start: 2019-01-24 | End: 2019-02-21 | Stop reason: SDUPTHER

## 2019-01-28 ENCOUNTER — NURSE ONLY (OUTPATIENT)
Dept: FAMILY MEDICINE CLINIC | Age: 44
End: 2019-01-28
Payer: MEDICAID

## 2019-01-28 DIAGNOSIS — J30.9 ALLERGIC RHINITIS, UNSPECIFIED SEASONALITY, UNSPECIFIED TRIGGER: Primary | ICD-10-CM

## 2019-01-28 PROCEDURE — 95115 IMMUNOTHERAPY ONE INJECTION: CPT | Performed by: FAMILY MEDICINE

## 2019-02-20 ENCOUNTER — NURSE ONLY (OUTPATIENT)
Dept: FAMILY MEDICINE CLINIC | Age: 44
End: 2019-02-20
Payer: MEDICAID

## 2019-02-20 DIAGNOSIS — J30.2 SEASONAL ALLERGIES: Primary | ICD-10-CM

## 2019-02-20 PROCEDURE — 95115 IMMUNOTHERAPY ONE INJECTION: CPT | Performed by: FAMILY MEDICINE

## 2019-02-21 DIAGNOSIS — F41.9 ANXIETY: ICD-10-CM

## 2019-02-21 RX ORDER — ERGOCALCIFEROL 1.25 MG/1
CAPSULE ORAL
Qty: 4 CAPSULE | Refills: 0 | Status: SHIPPED | OUTPATIENT
Start: 2019-02-21 | End: 2019-03-21 | Stop reason: SDUPTHER

## 2019-02-21 RX ORDER — BUPROPION HYDROCHLORIDE 300 MG/1
TABLET ORAL
Qty: 30 TABLET | Refills: 0 | Status: SHIPPED | OUTPATIENT
Start: 2019-02-21 | End: 2019-03-22

## 2019-02-21 RX ORDER — FUROSEMIDE 20 MG/1
TABLET ORAL
Qty: 30 TABLET | Refills: 0 | Status: SHIPPED | OUTPATIENT
Start: 2019-02-21 | End: 2019-03-21 | Stop reason: SDUPTHER

## 2019-02-21 RX ORDER — CLONAZEPAM 0.5 MG/1
TABLET ORAL
Qty: 45 TABLET | Refills: 0 | Status: SHIPPED | OUTPATIENT
Start: 2019-02-21 | End: 2019-03-21 | Stop reason: SDUPTHER

## 2019-02-27 ENCOUNTER — NURSE ONLY (OUTPATIENT)
Dept: FAMILY MEDICINE CLINIC | Age: 44
End: 2019-02-27
Payer: MEDICAID

## 2019-02-27 DIAGNOSIS — J30.9 ALLERGIC RHINITIS, UNSPECIFIED SEASONALITY, UNSPECIFIED TRIGGER: Primary | ICD-10-CM

## 2019-02-27 PROCEDURE — 95115 IMMUNOTHERAPY ONE INJECTION: CPT | Performed by: FAMILY MEDICINE

## 2019-03-12 ENCOUNTER — NURSE ONLY (OUTPATIENT)
Dept: FAMILY MEDICINE CLINIC | Age: 44
End: 2019-03-12
Payer: MEDICAID

## 2019-03-12 DIAGNOSIS — J30.9 ALLERGIC RHINITIS, UNSPECIFIED SEASONALITY, UNSPECIFIED TRIGGER: Primary | ICD-10-CM

## 2019-03-12 PROCEDURE — 95115 IMMUNOTHERAPY ONE INJECTION: CPT | Performed by: FAMILY MEDICINE

## 2019-03-21 DIAGNOSIS — F41.9 ANXIETY: ICD-10-CM

## 2019-03-22 DIAGNOSIS — F41.9 ANXIETY: ICD-10-CM

## 2019-03-22 RX ORDER — CLONAZEPAM 0.5 MG/1
TABLET ORAL
Qty: 45 TABLET | Refills: 0 | Status: CANCELLED | OUTPATIENT
Start: 2019-03-22 | End: 2019-04-22

## 2019-03-22 RX ORDER — BUPROPION HYDROCHLORIDE 300 MG/1
TABLET ORAL
Qty: 30 TABLET | Refills: 0 | Status: SHIPPED | OUTPATIENT
Start: 2019-03-22 | End: 2019-04-24

## 2019-03-22 RX ORDER — ERGOCALCIFEROL 1.25 MG/1
CAPSULE ORAL
Qty: 4 CAPSULE | Refills: 0 | Status: SHIPPED | OUTPATIENT
Start: 2019-03-22 | End: 2019-04-24 | Stop reason: SDUPTHER

## 2019-03-22 RX ORDER — LEVOTHYROXINE SODIUM 0.1 MG/1
TABLET ORAL
Qty: 30 TABLET | Refills: 0 | Status: SHIPPED | OUTPATIENT
Start: 2019-03-22 | End: 2019-04-29 | Stop reason: SDUPTHER

## 2019-03-22 RX ORDER — BUPROPION HYDROCHLORIDE 300 MG/1
TABLET ORAL
Qty: 30 TABLET | Refills: 0 | Status: CANCELLED | OUTPATIENT
Start: 2019-03-22

## 2019-03-22 RX ORDER — ERGOCALCIFEROL 1.25 MG/1
CAPSULE ORAL
Qty: 4 CAPSULE | Refills: 0 | Status: CANCELLED | OUTPATIENT
Start: 2019-03-22

## 2019-03-22 RX ORDER — CLONAZEPAM 0.5 MG/1
TABLET ORAL
Qty: 45 TABLET | Refills: 0 | Status: SHIPPED | OUTPATIENT
Start: 2019-03-22 | End: 2019-04-24 | Stop reason: SDUPTHER

## 2019-03-22 RX ORDER — LEVOTHYROXINE SODIUM 0.1 MG/1
TABLET ORAL
Qty: 30 TABLET | Refills: 2 | Status: CANCELLED | OUTPATIENT
Start: 2019-03-22

## 2019-03-22 RX ORDER — FUROSEMIDE 20 MG/1
TABLET ORAL
Qty: 30 TABLET | Refills: 0 | Status: SHIPPED | OUTPATIENT
Start: 2019-03-22 | End: 2019-04-24 | Stop reason: SDUPTHER

## 2019-03-22 RX ORDER — FUROSEMIDE 20 MG/1
TABLET ORAL
Qty: 30 TABLET | Refills: 0 | Status: CANCELLED | OUTPATIENT
Start: 2019-03-22

## 2019-03-28 ENCOUNTER — NURSE ONLY (OUTPATIENT)
Dept: FAMILY MEDICINE CLINIC | Age: 44
End: 2019-03-28
Payer: MEDICAID

## 2019-03-28 ENCOUNTER — TELEPHONE (OUTPATIENT)
Dept: FAMILY MEDICINE CLINIC | Age: 44
End: 2019-03-28

## 2019-03-28 DIAGNOSIS — E53.8 B12 DEFICIENCY: Primary | ICD-10-CM

## 2019-03-28 DIAGNOSIS — J30.2 SEASONAL ALLERGIES: ICD-10-CM

## 2019-03-28 PROCEDURE — 96372 THER/PROPH/DIAG INJ SC/IM: CPT | Performed by: FAMILY MEDICINE

## 2019-03-28 PROCEDURE — 95115 IMMUNOTHERAPY ONE INJECTION: CPT | Performed by: FAMILY MEDICINE

## 2019-03-28 RX ORDER — CYANOCOBALAMIN 1000 UG/ML
1000 INJECTION INTRAMUSCULAR; SUBCUTANEOUS ONCE
Status: COMPLETED | OUTPATIENT
Start: 2019-03-28 | End: 2019-03-28

## 2019-03-28 RX ADMIN — CYANOCOBALAMIN 1000 MCG: 1000 INJECTION INTRAMUSCULAR; SUBCUTANEOUS at 09:06

## 2019-04-01 ENCOUNTER — NURSE ONLY (OUTPATIENT)
Dept: FAMILY MEDICINE CLINIC | Age: 44
End: 2019-04-01
Payer: MEDICAID

## 2019-04-01 DIAGNOSIS — J30.2 SEASONAL ALLERGIES: Primary | ICD-10-CM

## 2019-04-01 PROCEDURE — 95115 IMMUNOTHERAPY ONE INJECTION: CPT | Performed by: FAMILY MEDICINE

## 2019-04-01 NOTE — PROGRESS NOTES
Administrations This Visit     ALLERGEN EXTRACT 0.3 mL     Admin Date  04/01/2019  16:44 Action  Given Dose  0.3 mL Route  Subcutaneous Site  Arm Right Administered By  Stephania Pete    Ordering Provider:  Sunshine Cisneros MD    Patient Supplied?:  Yes

## 2019-04-04 ENCOUNTER — NURSE ONLY (OUTPATIENT)
Dept: FAMILY MEDICINE CLINIC | Age: 44
End: 2019-04-04
Payer: MEDICAID

## 2019-04-04 DIAGNOSIS — J30.2 SEASONAL ALLERGIES: Primary | ICD-10-CM

## 2019-04-04 PROCEDURE — 95115 IMMUNOTHERAPY ONE INJECTION: CPT | Performed by: FAMILY MEDICINE

## 2019-04-04 NOTE — PROGRESS NOTES
Administrations This Visit     ALLERGEN EXTRACT 0.35 mL     Admin Date  04/04/2019  15:06 Action  Given Dose  0.35 mL Route  Subcutaneous Site  Arm Left Administered By  Jocelyn Skelton    Ordering Provider:  Shukri Pelaez MD    Patient Supplied?:  Yes

## 2019-04-08 ENCOUNTER — NURSE ONLY (OUTPATIENT)
Dept: FAMILY MEDICINE CLINIC | Age: 44
End: 2019-04-08
Payer: MEDICAID

## 2019-04-08 DIAGNOSIS — J30.2 SEASONAL ALLERGIES: Primary | ICD-10-CM

## 2019-04-08 PROCEDURE — 95115 IMMUNOTHERAPY ONE INJECTION: CPT | Performed by: FAMILY MEDICINE

## 2019-04-08 NOTE — PROGRESS NOTES
Administrations This Visit     ALLERGEN EXTRACT 0.35 mL     Admin Date  04/08/2019  16:43 Action  Given Dose  0.35 mL Route  Subcutaneous Site  Arm Right Administered By  Flori Urbano MA    Ordering Provider:  Bruno West MD    Patient Supplied?:  Yes

## 2019-04-11 ENCOUNTER — NURSE ONLY (OUTPATIENT)
Dept: FAMILY MEDICINE CLINIC | Age: 44
End: 2019-04-11
Payer: MEDICAID

## 2019-04-11 DIAGNOSIS — J30.2 SEASONAL ALLERGIES: Primary | ICD-10-CM

## 2019-04-11 PROCEDURE — 95115 IMMUNOTHERAPY ONE INJECTION: CPT | Performed by: FAMILY MEDICINE

## 2019-04-11 NOTE — PROGRESS NOTES
Administrations This Visit     ALLERGEN EXTRACT 0.35 mL     Admin Date  04/11/2019  10:58 Action  Given Dose  0.35 mL Route  Subcutaneous Site  Arm Left Administered By  Kane Bauer MA    Ordering Provider:  Lili Handley MD    Patient Supplied?:  Yes

## 2019-04-24 DIAGNOSIS — F41.9 ANXIETY: ICD-10-CM

## 2019-04-24 RX ORDER — FUROSEMIDE 20 MG/1
TABLET ORAL
Qty: 30 TABLET | Refills: 0 | Status: SHIPPED | OUTPATIENT
Start: 2019-04-24 | End: 2019-05-23 | Stop reason: SDUPTHER

## 2019-04-24 RX ORDER — CLONAZEPAM 0.5 MG/1
TABLET ORAL
Qty: 45 TABLET | Refills: 0 | Status: SHIPPED | OUTPATIENT
Start: 2019-04-24 | End: 2019-04-29 | Stop reason: SDUPTHER

## 2019-04-24 RX ORDER — BUPROPION HYDROCHLORIDE 300 MG/1
TABLET ORAL
Qty: 30 TABLET | Refills: 0 | Status: SHIPPED | OUTPATIENT
Start: 2019-04-24 | End: 2019-04-29 | Stop reason: SDUPTHER

## 2019-04-24 RX ORDER — ERGOCALCIFEROL 1.25 MG/1
CAPSULE ORAL
Qty: 4 CAPSULE | Refills: 0 | Status: SHIPPED | OUTPATIENT
Start: 2019-04-24 | End: 2019-05-23 | Stop reason: SDUPTHER

## 2019-04-24 NOTE — TELEPHONE ENCOUNTER
Refill request received from pharmacy.  Medication pending for approval.     Patient information below:    Ludy Carpenter up to date    Hemoglobin A1C (%)   Date Value   07/17/2017 5.5   02/16/2016 5.1     LDL Calculated (mg/dL)   Date Value   08/01/2016 63     AST (U/L)   Date Value   09/24/2018 16     ALT (U/L)   Date Value   09/24/2018 7     BUN (mg/dL)   Date Value   09/24/2018 11      (goal A1C is < 7)   (goal LDL is <100) need 30-50% reduction from baseline     BP Readings from Last 3 Encounters:   01/22/19 132/80   10/25/18 118/68   09/24/18 112/72    (goal /80)      All Future Testing planned in [facility]:  Lab Frequency Next Occurrence       Last Office Visit With PCP:  3/28/2019      Next Visit Date:  Future Appointments   Date Time Provider Petr Young   4/29/2019  9:00 AM Heber Milner MD [address]            Patient Active Problem List:     Chronic low back pain     Left leg pain     Sciatica     Nervous disorder     Depression     Migraine     Seasonal allergies     COPD (chronic obstructive pulmonary disease) (HCC)     DDD (degenerative disc disease)     Hypothyroid     Obesity     Right shoulder pain     Hoarseness     GERD (gastroesophageal reflux disease)     Dysphagia     Pelvic pain     Urinary incontinence     Rectal pain     B12 deficiency     Anal fissure     Other fatigue     Tarsal tunnel syndrome of left side

## 2019-04-29 ENCOUNTER — OFFICE VISIT (OUTPATIENT)
Dept: FAMILY MEDICINE CLINIC | Age: 44
End: 2019-04-29
Payer: MEDICAID

## 2019-04-29 ENCOUNTER — HOSPITAL ENCOUNTER (OUTPATIENT)
Facility: HOSPITAL | Age: 44
Discharge: HOME | End: 2019-04-29
Payer: MEDICAID

## 2019-04-29 VITALS
DIASTOLIC BLOOD PRESSURE: 72 MMHG | RESPIRATION RATE: 20 BRPM | BODY MASS INDEX: 39.27 KG/M2 | HEIGHT: 61 IN | HEART RATE: 80 BPM | WEIGHT: 208 LBS | OXYGEN SATURATION: 99 % | SYSTOLIC BLOOD PRESSURE: 110 MMHG

## 2019-04-29 DIAGNOSIS — R73.9 ELEVATED BLOOD SUGAR: ICD-10-CM

## 2019-04-29 DIAGNOSIS — J30.2 SEASONAL ALLERGIES: Primary | ICD-10-CM

## 2019-04-29 DIAGNOSIS — F41.9 ANXIETY: ICD-10-CM

## 2019-04-29 DIAGNOSIS — R63.5 WEIGHT GAIN: ICD-10-CM

## 2019-04-29 LAB
A/G RATIO: 1.7 (ref 0.8–2)
ALBUMIN SERPL-MCNC: 4.1 G/DL (ref 3.4–4.8)
ALP BLD-CCNC: 93 U/L (ref 25–100)
ALT SERPL-CCNC: 6 U/L (ref 4–36)
ANION GAP SERPL CALCULATED.3IONS-SCNC: 11 MMOL/L (ref 3–16)
AST SERPL-CCNC: 13 U/L (ref 8–33)
BILIRUB SERPL-MCNC: 0.3 MG/DL (ref 0.3–1.2)
BUN BLDV-MCNC: 17 MG/DL (ref 6–20)
CALCIUM SERPL-MCNC: 9.1 MG/DL (ref 8.5–10.5)
CHLORIDE BLD-SCNC: 105 MMOL/L (ref 98–107)
CO2: 26 MMOL/L (ref 20–30)
CREAT SERPL-MCNC: 0.8 MG/DL (ref 0.4–1.2)
GFR AFRICAN AMERICAN: >59
GFR NON-AFRICAN AMERICAN: >60
GLOBULIN: 2.4 G/DL
GLUCOSE BLD-MCNC: 116 MG/DL (ref 74–106)
HBA1C MFR BLD: 5 %
POTASSIUM SERPL-SCNC: 4.3 MMOL/L (ref 3.4–5.1)
SODIUM BLD-SCNC: 142 MMOL/L (ref 136–145)
TOTAL PROTEIN: 6.5 G/DL (ref 6.4–8.3)

## 2019-04-29 PROCEDURE — 83036 HEMOGLOBIN GLYCOSYLATED A1C: CPT

## 2019-04-29 PROCEDURE — 1036F TOBACCO NON-USER: CPT | Performed by: FAMILY MEDICINE

## 2019-04-29 PROCEDURE — 80053 COMPREHEN METABOLIC PANEL: CPT

## 2019-04-29 PROCEDURE — G8417 CALC BMI ABV UP PARAM F/U: HCPCS | Performed by: FAMILY MEDICINE

## 2019-04-29 PROCEDURE — G8427 DOCREV CUR MEDS BY ELIG CLIN: HCPCS | Performed by: FAMILY MEDICINE

## 2019-04-29 PROCEDURE — 99214 OFFICE O/P EST MOD 30 MIN: CPT | Performed by: FAMILY MEDICINE

## 2019-04-29 RX ORDER — PHENTERMINE HYDROCHLORIDE 37.5 MG/1
37.5 TABLET ORAL
Qty: 30 TABLET | Refills: 2 | Status: SHIPPED | OUTPATIENT
Start: 2019-04-29 | End: 2019-05-29 | Stop reason: SDUPTHER

## 2019-04-29 RX ORDER — DEXLANSOPRAZOLE 60 MG/1
CAPSULE, DELAYED RELEASE ORAL
Qty: 30 CAPSULE | Refills: 5 | Status: SHIPPED | OUTPATIENT
Start: 2019-04-29 | End: 2019-10-23 | Stop reason: SDUPTHER

## 2019-04-29 RX ORDER — BUPROPION HYDROCHLORIDE 300 MG/1
TABLET ORAL
Qty: 30 TABLET | Refills: 5 | Status: SHIPPED | OUTPATIENT
Start: 2019-04-29 | End: 2019-10-23 | Stop reason: SDUPTHER

## 2019-04-29 RX ORDER — CYANOCOBALAMIN 1000 UG/ML
1000 INJECTION INTRAMUSCULAR; SUBCUTANEOUS ONCE
Status: COMPLETED | OUTPATIENT
Start: 2019-04-29 | End: 2019-04-29

## 2019-04-29 RX ORDER — FAMOTIDINE 40 MG/1
TABLET, FILM COATED ORAL
Qty: 60 TABLET | Refills: 5 | Status: SHIPPED | OUTPATIENT
Start: 2019-04-29 | End: 2019-12-16 | Stop reason: SDUPTHER

## 2019-04-29 RX ORDER — LEVOTHYROXINE SODIUM 0.1 MG/1
TABLET ORAL
Qty: 30 TABLET | Refills: 5 | Status: SHIPPED | OUTPATIENT
Start: 2019-04-29 | End: 2019-10-23 | Stop reason: SDUPTHER

## 2019-04-29 RX ORDER — CLONAZEPAM 0.5 MG/1
TABLET ORAL
Qty: 45 TABLET | Refills: 2 | Status: SHIPPED | OUTPATIENT
Start: 2019-04-29 | End: 2019-05-29 | Stop reason: SDUPTHER

## 2019-04-29 RX ADMIN — CYANOCOBALAMIN 1000 MCG: 1000 INJECTION INTRAMUSCULAR; SUBCUTANEOUS at 11:45

## 2019-04-29 ASSESSMENT — ENCOUNTER SYMPTOMS: VOICE CHANGE: 1

## 2019-04-29 NOTE — PROGRESS NOTES
SUBJECTIVE:    Patient ID: Bright Abreu is a 45 y.o. female. Chief Complaint   Patient presents with    Headache    Weight Gain       HPI:she's here today in follow-up of her weight gain. She is working hard on her diet. She is working on losing weight. She says she does feel like the medication helps. She's not had any chest pain. She hasn't any shortness of breath or palpitations related to this medicine. She says she is doing better with her diet. She's trying to be active. She continues to see pain management about her back. She is having more headaches than she used to. She says she thinks a lot of stress related to stress. she is having more seasonal allergy symptoms. Her blood sugars have been elevated in the past. She does have a significant family history of diabetes. Review of Systems   HENT: Positive for voice change. Musculoskeletal: Positive for gait problem. Psychiatric/Behavioral: The patient is nervous/anxious. All other systems reviewed and are negative. OBJECTIVE:  Wt Readings from Last 3 Encounters:   04/29/19 208 lb (94.3 kg)   01/22/19 194 lb (88 kg)   10/25/18 195 lb (88.5 kg)     BP Readings from Last 3 Encounters:   04/29/19 110/72   01/22/19 132/80   10/25/18 118/68      Pulse Readings from Last 3 Encounters:   04/29/19 80   01/22/19 88   10/25/18 99     Body mass index is 39.3 kg/m². Resp Readings from Last 3 Encounters:   04/29/19 20   01/22/19 18   10/25/18 18     Physical Exam   Constitutional: She is oriented to person, place, and time. Vital signs are normal. She appears well-developed and well-nourished. HENT:   Head: Normocephalic and atraumatic. Right Ear: Hearing and external ear normal.   Left Ear: Hearing and external ear normal.   Nose: Nose normal.   Mouth/Throat: Uvula is midline, oropharynx is clear and moist and mucous membranes are normal.   Eyes: Pupils are equal, round, and reactive to light.  Conjunctivae, EOM and lids are normal.   Neck: Trachea normal, normal range of motion and phonation normal. Neck supple. Carotid bruit is not present. No thyroid mass and no thyromegaly present. Cardiovascular: Normal rate, regular rhythm, S1 normal, S2 normal, normal heart sounds, intact distal pulses and normal pulses. Exam reveals no gallop and no friction rub. No murmur heard. Pulmonary/Chest: Effort normal and breath sounds normal.   Abdominal: Normal appearance. Genitourinary: Rectum normal. Pelvic exam was performed with patient supine. No labial fusion. There is no rash, tenderness, lesion or injury on the right labia. There is no rash, tenderness, lesion or injury on the left labia. No erythema, tenderness or bleeding in the vagina. No foreign body in the vagina. No signs of injury around the vagina. No vaginal discharge found. Genitourinary Comments: Status post hysterectomy     Musculoskeletal:        Right knee: She exhibits decreased range of motion. Tenderness found. Medial joint line tenderness noted. Lumbar back: She exhibits decreased range of motion, tenderness, pain and spasm. Right si joint tenderness    Neurological: She is alert and oriented to person, place, and time. Skin: Skin is warm and dry. Psychiatric: She has a normal mood and affect. Nursing note and vitals reviewed.       Results in Past 30 Days  Result Component Current Result Ref Range Previous Result Ref Range   Alb 4.1 (4/29/2019) 3.4 - 4.8 g/dL Not in Time Range    Albumin/Globulin Ratio 1.7 (4/29/2019) 0.8 - 2.0 Not in Time Range    Alkaline Phosphatase 93 (4/29/2019) 25 - 100 U/L Not in Time Range    ALT 6 (4/29/2019) 4 - 36 U/L Not in Time Range    AST 13 (4/29/2019) 8 - 33 U/L Not in Time Range    BUN 17 (4/29/2019) 6 - 20 mg/dL Not in Time Range    Calcium 9.1 (4/29/2019) 8.5 - 10.5 mg/dL Not in Time Range    Chloride 105 (4/29/2019) 98 - 107 mmol/L Not in Time Range    CO2 26 (4/29/2019) 20 - 30 mmol/L Not in Time Range    CREATININE 0.8

## 2019-05-06 ENCOUNTER — NURSE ONLY (OUTPATIENT)
Dept: FAMILY MEDICINE CLINIC | Age: 44
End: 2019-05-06
Payer: MEDICAID

## 2019-05-06 DIAGNOSIS — J30.2 SEASONAL ALLERGIES: Primary | ICD-10-CM

## 2019-05-06 PROCEDURE — 95115 IMMUNOTHERAPY ONE INJECTION: CPT | Performed by: FAMILY MEDICINE

## 2019-05-13 ENCOUNTER — NURSE ONLY (OUTPATIENT)
Dept: FAMILY MEDICINE CLINIC | Age: 44
End: 2019-05-13
Payer: MEDICAID

## 2019-05-13 DIAGNOSIS — E53.8 B12 DEFICIENCY: ICD-10-CM

## 2019-05-13 DIAGNOSIS — J30.9 ALLERGIC RHINITIS, UNSPECIFIED SEASONALITY, UNSPECIFIED TRIGGER: Primary | ICD-10-CM

## 2019-05-13 PROCEDURE — 95115 IMMUNOTHERAPY ONE INJECTION: CPT | Performed by: FAMILY MEDICINE

## 2019-05-15 PROCEDURE — 96372 THER/PROPH/DIAG INJ SC/IM: CPT | Performed by: FAMILY MEDICINE

## 2019-05-15 RX ORDER — CYANOCOBALAMIN 1000 UG/ML
1000 INJECTION INTRAMUSCULAR; SUBCUTANEOUS ONCE
Status: COMPLETED | OUTPATIENT
Start: 2019-05-15 | End: 2019-05-15

## 2019-05-15 RX ADMIN — CYANOCOBALAMIN 1000 MCG: 1000 INJECTION INTRAMUSCULAR; SUBCUTANEOUS at 11:34

## 2019-05-15 NOTE — PROGRESS NOTES
Administrations This Visit     ALLERGEN EXTRACT 0.35 mL     Admin Date  05/15/2019 Action  Given Dose  0.35 mL Route  Subcutaneous Administered By  Jacobo Ballard          cyanocobalamin injection 1,000 mcg     Admin Date  05/15/2019 Action  Given Dose  1000 mcg Route  Intramuscular Administered By  Jacobo Ballard

## 2019-05-23 RX ORDER — BUDESONIDE AND FORMOTEROL FUMARATE DIHYDRATE 160; 4.5 UG/1; UG/1
AEROSOL RESPIRATORY (INHALATION)
Qty: 10.2 G | Refills: 0 | Status: SHIPPED | OUTPATIENT
Start: 2019-05-23 | End: 2019-05-29 | Stop reason: SDUPTHER

## 2019-05-23 RX ORDER — OXYBUTYNIN CHLORIDE 10 MG/1
TABLET, EXTENDED RELEASE ORAL
Qty: 30 TABLET | Refills: 0 | Status: SHIPPED | OUTPATIENT
Start: 2019-05-23 | End: 2019-05-29 | Stop reason: SDUPTHER

## 2019-05-23 RX ORDER — FUROSEMIDE 20 MG/1
TABLET ORAL
Qty: 30 TABLET | Refills: 0 | Status: SHIPPED | OUTPATIENT
Start: 2019-05-23 | End: 2019-05-29 | Stop reason: SDUPTHER

## 2019-05-23 RX ORDER — FLUTICASONE PROPIONATE 50 MCG
SPRAY, SUSPENSION (ML) NASAL
Qty: 16 G | Refills: 0 | Status: SHIPPED | OUTPATIENT
Start: 2019-05-23 | End: 2019-05-29 | Stop reason: SDUPTHER

## 2019-05-23 RX ORDER — ERGOCALCIFEROL 1.25 MG/1
CAPSULE ORAL
Qty: 4 CAPSULE | Refills: 0 | Status: SHIPPED | OUTPATIENT
Start: 2019-05-23 | End: 2019-05-29 | Stop reason: SDUPTHER

## 2019-05-23 NOTE — TELEPHONE ENCOUNTER
Refill request received from pharmacy.  Medication pending for approval.     Patient information below:      Hemoglobin A1C (%)   Date Value   04/29/2019 5.0   07/17/2017 5.5   02/16/2016 5.1     LDL Calculated (mg/dL)   Date Value   08/01/2016 63     AST (U/L)   Date Value   04/29/2019 13     ALT (U/L)   Date Value   04/29/2019 6     BUN (mg/dL)   Date Value   04/29/2019 17      (goal A1C is < 7)   (goal LDL is <100) need 30-50% reduction from baseline     BP Readings from Last 3 Encounters:   04/29/19 110/72   01/22/19 132/80   10/25/18 118/68    (goal /80)      All Future Testing planned in [facility]:  Lab Frequency Next Occurrence       Last Office Visit With PCP:  4/29/2019      Next Visit Date:  Future Appointments   Date Time Provider Petr Young   5/29/2019  8:30 AM Belinda Aquino MD [address]            Patient Active Problem List:     Chronic low back pain     Left leg pain     Sciatica     Nervous disorder     Depression     Migraine     Seasonal allergies     COPD (chronic obstructive pulmonary disease) (HCC)     DDD (degenerative disc disease)     Hypothyroid     Obesity     Right shoulder pain     Hoarseness     GERD (gastroesophageal reflux disease)     Dysphagia     Pelvic pain     Urinary incontinence     Rectal pain     B12 deficiency     Anal fissure     Other fatigue     Tarsal tunnel syndrome of left side

## 2019-05-29 ENCOUNTER — HOSPITAL ENCOUNTER (OUTPATIENT)
Facility: HOSPITAL | Age: 44
Discharge: HOME | End: 2019-05-29
Payer: MEDICAID

## 2019-05-29 ENCOUNTER — OFFICE VISIT (OUTPATIENT)
Dept: FAMILY MEDICINE CLINIC | Age: 44
End: 2019-05-29
Payer: MEDICAID

## 2019-05-29 VITALS
RESPIRATION RATE: 18 BRPM | OXYGEN SATURATION: 98 % | SYSTOLIC BLOOD PRESSURE: 116 MMHG | HEIGHT: 61 IN | DIASTOLIC BLOOD PRESSURE: 78 MMHG | BODY MASS INDEX: 38.89 KG/M2 | HEART RATE: 84 BPM | WEIGHT: 206 LBS

## 2019-05-29 DIAGNOSIS — R53.83 FATIGUE, UNSPECIFIED TYPE: ICD-10-CM

## 2019-05-29 DIAGNOSIS — F41.9 ANXIETY: ICD-10-CM

## 2019-05-29 DIAGNOSIS — E03.9 HYPOTHYROIDISM, UNSPECIFIED TYPE: ICD-10-CM

## 2019-05-29 DIAGNOSIS — F32.A DEPRESSION, UNSPECIFIED DEPRESSION TYPE: ICD-10-CM

## 2019-05-29 DIAGNOSIS — R63.5 WEIGHT GAIN: Primary | ICD-10-CM

## 2019-05-29 DIAGNOSIS — R63.5 WEIGHT GAIN: ICD-10-CM

## 2019-05-29 DIAGNOSIS — J30.9 ALLERGIC RHINITIS, UNSPECIFIED SEASONALITY, UNSPECIFIED TRIGGER: ICD-10-CM

## 2019-05-29 DIAGNOSIS — R73.9 ELEVATED BLOOD SUGAR: ICD-10-CM

## 2019-05-29 LAB
A/G RATIO: 1.9 (ref 0.8–2)
ALBUMIN SERPL-MCNC: 3.9 G/DL (ref 3.4–4.8)
ALP BLD-CCNC: 108 U/L (ref 25–100)
ALT SERPL-CCNC: 6 U/L (ref 4–36)
ANION GAP SERPL CALCULATED.3IONS-SCNC: 12 MMOL/L (ref 3–16)
AST SERPL-CCNC: 9 U/L (ref 8–33)
BILIRUB SERPL-MCNC: <0.2 MG/DL (ref 0.3–1.2)
BUN BLDV-MCNC: 12 MG/DL (ref 6–20)
CALCIUM SERPL-MCNC: 8.8 MG/DL (ref 8.5–10.5)
CHLORIDE BLD-SCNC: 103 MMOL/L (ref 98–107)
CO2: 26 MMOL/L (ref 20–30)
CREAT SERPL-MCNC: 0.6 MG/DL (ref 0.4–1.2)
GFR AFRICAN AMERICAN: >59
GFR NON-AFRICAN AMERICAN: >60
GLOBULIN: 2.1 G/DL
GLUCOSE BLD-MCNC: 101 MG/DL (ref 74–106)
POTASSIUM SERPL-SCNC: 4.1 MMOL/L (ref 3.4–5.1)
SODIUM BLD-SCNC: 141 MMOL/L (ref 136–145)
T4 FREE: 1.2 NG/DL (ref 0.89–1.76)
TOTAL PROTEIN: 6 G/DL (ref 6.4–8.3)
TSH SERPL DL<=0.05 MIU/L-ACNC: 5.02 UIU/ML (ref 0.35–5.5)

## 2019-05-29 PROCEDURE — G8417 CALC BMI ABV UP PARAM F/U: HCPCS | Performed by: FAMILY MEDICINE

## 2019-05-29 PROCEDURE — 80053 COMPREHEN METABOLIC PANEL: CPT

## 2019-05-29 PROCEDURE — 84443 ASSAY THYROID STIM HORMONE: CPT

## 2019-05-29 PROCEDURE — 95115 IMMUNOTHERAPY ONE INJECTION: CPT | Performed by: FAMILY MEDICINE

## 2019-05-29 PROCEDURE — 1036F TOBACCO NON-USER: CPT | Performed by: FAMILY MEDICINE

## 2019-05-29 PROCEDURE — 99214 OFFICE O/P EST MOD 30 MIN: CPT | Performed by: FAMILY MEDICINE

## 2019-05-29 PROCEDURE — G8427 DOCREV CUR MEDS BY ELIG CLIN: HCPCS | Performed by: FAMILY MEDICINE

## 2019-05-29 PROCEDURE — 84439 ASSAY OF FREE THYROXINE: CPT

## 2019-05-29 RX ORDER — FLUTICASONE PROPIONATE 50 MCG
SPRAY, SUSPENSION (ML) NASAL
Qty: 16 G | Refills: 5 | Status: SHIPPED | OUTPATIENT
Start: 2019-05-29 | End: 2019-12-16 | Stop reason: SDUPTHER

## 2019-05-29 RX ORDER — FUROSEMIDE 20 MG/1
TABLET ORAL
Qty: 30 TABLET | Refills: 5 | Status: SHIPPED | OUTPATIENT
Start: 2019-05-29 | End: 2019-10-23 | Stop reason: SDUPTHER

## 2019-05-29 RX ORDER — BUDESONIDE AND FORMOTEROL FUMARATE DIHYDRATE 160; 4.5 UG/1; UG/1
AEROSOL RESPIRATORY (INHALATION)
Qty: 10.2 G | Refills: 5 | Status: SHIPPED | OUTPATIENT
Start: 2019-05-29 | End: 2019-07-24

## 2019-05-29 RX ORDER — CLONAZEPAM 0.5 MG/1
TABLET ORAL
Qty: 45 TABLET | Refills: 2 | Status: SHIPPED | OUTPATIENT
Start: 2019-05-29 | End: 2019-07-24 | Stop reason: SDUPTHER

## 2019-05-29 RX ORDER — OXYBUTYNIN CHLORIDE 10 MG/1
TABLET, EXTENDED RELEASE ORAL
Qty: 30 TABLET | Refills: 5 | Status: SHIPPED | OUTPATIENT
Start: 2019-05-29 | End: 2019-10-23 | Stop reason: SDUPTHER

## 2019-05-29 RX ORDER — ERGOCALCIFEROL 1.25 MG/1
CAPSULE ORAL
Qty: 4 CAPSULE | Refills: 2 | Status: SHIPPED | OUTPATIENT
Start: 2019-05-29 | End: 2019-09-16 | Stop reason: SDUPTHER

## 2019-05-29 RX ORDER — PHENTERMINE HYDROCHLORIDE 37.5 MG/1
37.5 TABLET ORAL
Qty: 30 TABLET | Refills: 2 | Status: SHIPPED | OUTPATIENT
Start: 2019-05-29 | End: 2019-07-24 | Stop reason: SDUPTHER

## 2019-05-29 ASSESSMENT — ENCOUNTER SYMPTOMS: VOICE CHANGE: 1

## 2019-05-29 NOTE — PROGRESS NOTES
light. Conjunctivae, EOM and lids are normal.   Neck: Trachea normal, normal range of motion and phonation normal. Neck supple. Carotid bruit is not present. No thyroid mass and no thyromegaly present. Cardiovascular: Normal rate, regular rhythm, S1 normal, S2 normal, normal heart sounds, intact distal pulses and normal pulses. Exam reveals no gallop and no friction rub. No murmur heard. Pulmonary/Chest: Effort normal and breath sounds normal.   Abdominal: Normal appearance. Genitourinary: Rectum normal. Pelvic exam was performed with patient supine. No labial fusion. There is no rash, tenderness, lesion or injury on the right labia. There is no rash, tenderness, lesion or injury on the left labia. No erythema, tenderness or bleeding in the vagina. No foreign body in the vagina. No signs of injury around the vagina. No vaginal discharge found. Genitourinary Comments: Status post hysterectomy     Musculoskeletal:        Right knee: She exhibits decreased range of motion. Tenderness found. Medial joint line tenderness noted. Lumbar back: She exhibits decreased range of motion, tenderness, pain and spasm. Right si joint tenderness    Neurological: She is alert and oriented to person, place, and time. Skin: Skin is warm and dry. Psychiatric: She has a normal mood and affect. Nursing note and vitals reviewed. No results found for requested labs within last 30 days. Hemoglobin A1C (%)   Date Value   04/29/2019 5.0     LDL Calculated (mg/dL)   Date Value   08/01/2016 63         Lab Results   Component Value Date    WBC 6.1 09/24/2018    NEUTROABS 5.0 04/24/2017    HGB 11.5 09/24/2018    HCT 37.2 09/24/2018    MCV 88.8 09/24/2018     09/24/2018       Lab Results   Component Value Date    TSH 2.77 09/24/2018         ASSESSMENT:    Diagnosis Orders   1. Weight gain  phentermine (ADIPEX-P) 37.5 MG tablet    COMPREHENSIVE METABOLIC PANEL   2.  Anxiety  clonazePAM (KLONOPIN) 0.5 MG tablet 3. Allergic rhinitis, unspecified seasonality, unspecified trigger  ALLERGEN EXTRACT 0.25 mL   4. Hypothyroidism, unspecified type  TSH without Reflex    T4, FREE   5. Elevated blood sugar     6. Depression, unspecified depression type     7. Fatigue, unspecified type          PLAN:  Orders Placed This Encounter   Medications    furosemide (LASIX) 20 MG tablet     Sig: TAKE ONE TABLET BY MOUTH EVERY DAY     Dispense:  30 tablet     Refill:  5    vitamin D (ERGOCALCIFEROL) 08883 units CAPS capsule     Sig: TAKE ONE CAPSULE ONCE WEEKLY     Dispense:  4 capsule     Refill:  2    fluticasone (FLONASE) 50 MCG/ACT nasal spray     Sig: USE 1 SPRAY IN EACH NOSTRIL DAILY     Dispense:  16 g     Refill:  5    oxybutynin (DITROPAN-XL) 10 MG extended release tablet     Sig: TAKE 1 TABLET DAILY. Dispense:  30 tablet     Refill:  5    budesonide-formoterol (SYMBICORT) 160-4.5 MCG/ACT AERO     Sig: INHALE 2 PUFFS BY MOUTH TWICE A DAY     Dispense:  10.2 g     Refill:  5    clonazePAM (KLONOPIN) 0.5 MG tablet     Sig: TAKE 1/2 TABLET EVERY MORNING AND 1 TABLET EVENING     Dispense:  45 tablet     Refill:  2    phentermine (ADIPEX-P) 37.5 MG tablet     Sig: Take 1 tablet by mouth every morning (before breakfast) for 30 days. Dispense:  30 tablet     Refill:  2    ALLERGEN EXTRACT 0.25 mL           Medications Discontinued During This Encounter   Medication Reason    furosemide (LASIX) 20 MG tablet REORDER    vitamin D (ERGOCALCIFEROL) 19552 units CAPS capsule REORDER    fluticasone (FLONASE) 50 MCG/ACT nasal spray REORDER    oxybutynin (DITROPAN-XL) 10 MG extended release tablet REORDER    SYMBICORT 160-4.5 MCG/ACT AERO REORDER    clonazePAM (KLONOPIN) 0.5 MG tablet REORDER    phentermine (ADIPEX-P) 37.5 MG tablet REORDER       Controlled Substances Monitoring: Attestation: The Prescription Monitoring Report for this patient was reviewed today.  Ryan Tubbs MD)  Chronic Pain Routine Monitoring: Possible

## 2019-06-10 ENCOUNTER — NURSE ONLY (OUTPATIENT)
Dept: FAMILY MEDICINE CLINIC | Age: 44
End: 2019-06-10

## 2019-06-10 DIAGNOSIS — J30.9 ALLERGIC RHINITIS, UNSPECIFIED SEASONALITY, UNSPECIFIED TRIGGER: Primary | ICD-10-CM

## 2019-06-13 ENCOUNTER — NURSE ONLY (OUTPATIENT)
Dept: FAMILY MEDICINE CLINIC | Age: 44
End: 2019-06-13
Payer: MEDICAID

## 2019-06-13 DIAGNOSIS — E53.8 B12 DEFICIENCY: ICD-10-CM

## 2019-06-13 DIAGNOSIS — J30.9 ALLERGIC RHINITIS, UNSPECIFIED SEASONALITY, UNSPECIFIED TRIGGER: Primary | ICD-10-CM

## 2019-06-13 PROCEDURE — 96372 THER/PROPH/DIAG INJ SC/IM: CPT | Performed by: FAMILY MEDICINE

## 2019-06-13 PROCEDURE — 95115 IMMUNOTHERAPY ONE INJECTION: CPT | Performed by: FAMILY MEDICINE

## 2019-06-13 RX ORDER — CYANOCOBALAMIN 1000 UG/ML
1000 INJECTION INTRAMUSCULAR; SUBCUTANEOUS ONCE
Status: COMPLETED | OUTPATIENT
Start: 2019-06-13 | End: 2019-06-13

## 2019-06-13 RX ADMIN — CYANOCOBALAMIN 1000 MCG: 1000 INJECTION INTRAMUSCULAR; SUBCUTANEOUS at 15:17

## 2019-06-13 NOTE — PROGRESS NOTES
Administrations This Visit     ALLERGEN EXTRACT 0.3 mL     Admin Date  06/13/2019  15:17 Action  Given Dose  0.3 mL Route  Subcutaneous Site  Arm Left Administered By  Debra Naik MA    Ordering Provider:  Tameka Pandey MD    Patient Supplied?:  Yes          cyanocobalamin injection 1,000 mcg     Admin Date  06/13/2019  15:17 Action  Given Dose  1000 mcg Route  Intramuscular Site  Deltoid Right Administered By  Debra Naik MA    Ordering Provider:  Tameka Pandey MD    [address]:  [id number]    Lot#:  [id number]    :  [id number] [facility]    Patient Supplied?:  No

## 2019-06-20 ENCOUNTER — NURSE ONLY (OUTPATIENT)
Dept: FAMILY MEDICINE CLINIC | Age: 44
End: 2019-06-20
Payer: MEDICAID

## 2019-06-20 DIAGNOSIS — J30.9 ALLERGIC RHINITIS, UNSPECIFIED SEASONALITY, UNSPECIFIED TRIGGER: Primary | ICD-10-CM

## 2019-06-20 PROCEDURE — 95115 IMMUNOTHERAPY ONE INJECTION: CPT | Performed by: FAMILY MEDICINE

## 2019-06-21 RX ORDER — MONTELUKAST SODIUM 10 MG/1
TABLET ORAL
Qty: 30 TABLET | Refills: 0 | Status: SHIPPED | OUTPATIENT
Start: 2019-06-21 | End: 2019-07-19 | Stop reason: SDUPTHER

## 2019-06-21 RX ORDER — LEVOCETIRIZINE DIHYDROCHLORIDE 5 MG/1
TABLET, FILM COATED ORAL
Qty: 30 TABLET | Refills: 0 | Status: SHIPPED | OUTPATIENT
Start: 2019-06-21 | End: 2019-07-22

## 2019-06-21 NOTE — TELEPHONE ENCOUNTER
Refill request received from pharmacy.  Medication pending for approval.     Patient information below:      Hemoglobin A1C (%)   Date Value   04/29/2019 5.0   07/17/2017 5.5   02/16/2016 5.1     LDL Calculated (mg/dL)   Date Value   08/01/2016 63     AST (U/L)   Date Value   05/29/2019 9     ALT (U/L)   Date Value   05/29/2019 6     BUN (mg/dL)   Date Value   05/29/2019 12      (goal A1C is < 7)   (goal LDL is <100) need 30-50% reduction from baseline     BP Readings from Last 3 Encounters:   05/29/19 116/78   04/29/19 110/72   01/22/19 132/80    (goal /80)      All Future Testing planned in [facility]:  Lab Frequency Next Occurrence       Last Office Visit With PCP:  5/29/2019      Next Visit Date:  Future Appointments   Date Time Provider Petr Young   7/24/2019  8:45 AM Tameka Pandey MD [address] [place] [address]            Patient Active Problem List:     Chronic low back pain     Left leg pain     Sciatica     Nervous disorder     Depression     Migraine     Seasonal allergies     COPD (chronic obstructive pulmonary disease) (HCC)     DDD (degenerative disc disease)     Hypothyroid     Obesity     Right shoulder pain     Hoarseness     GERD (gastroesophageal reflux disease)     Dysphagia     Pelvic pain     Urinary incontinence     Rectal pain     B12 deficiency     Anal fissure     Other fatigue     Tarsal tunnel syndrome of left side

## 2019-06-26 ENCOUNTER — NURSE ONLY (OUTPATIENT)
Dept: FAMILY MEDICINE CLINIC | Age: 44
End: 2019-06-26
Payer: MEDICAID

## 2019-06-26 DIAGNOSIS — J30.9 ALLERGIC RHINITIS, UNSPECIFIED SEASONALITY, UNSPECIFIED TRIGGER: Primary | ICD-10-CM

## 2019-06-26 PROCEDURE — 95115 IMMUNOTHERAPY ONE INJECTION: CPT | Performed by: FAMILY MEDICINE

## 2019-06-26 NOTE — PROGRESS NOTES
Administrations This Visit     ALLERGEN EXTRACT 0.35 mL     Admin Date  06/26/2019  14:44 Action  Given Dose  0.35 mL Route  Subcutaneous Site  Arm Left Administered By  Emerita Feliz RN    Ordering Provider:  Brooke Mendoza MD    Patient Supplied?:  Yes

## 2019-07-17 ENCOUNTER — NURSE ONLY (OUTPATIENT)
Dept: FAMILY MEDICINE CLINIC | Age: 44
End: 2019-07-17
Payer: MEDICAID

## 2019-07-17 DIAGNOSIS — E53.8 B12 DEFICIENCY: ICD-10-CM

## 2019-07-17 DIAGNOSIS — J30.9 ALLERGIC RHINITIS, UNSPECIFIED SEASONALITY, UNSPECIFIED TRIGGER: Primary | ICD-10-CM

## 2019-07-17 PROCEDURE — 96372 THER/PROPH/DIAG INJ SC/IM: CPT | Performed by: FAMILY MEDICINE

## 2019-07-17 PROCEDURE — 95115 IMMUNOTHERAPY ONE INJECTION: CPT | Performed by: FAMILY MEDICINE

## 2019-07-17 RX ORDER — CYANOCOBALAMIN 1000 UG/ML
1000 INJECTION INTRAMUSCULAR; SUBCUTANEOUS ONCE
Status: COMPLETED | OUTPATIENT
Start: 2019-07-17 | End: 2019-07-17

## 2019-07-17 RX ADMIN — CYANOCOBALAMIN 1000 MCG: 1000 INJECTION INTRAMUSCULAR; SUBCUTANEOUS at 14:49

## 2019-07-22 ENCOUNTER — NURSE ONLY (OUTPATIENT)
Dept: FAMILY MEDICINE CLINIC | Age: 44
End: 2019-07-22

## 2019-07-22 DIAGNOSIS — J30.9 ALLERGIC RHINITIS, UNSPECIFIED SEASONALITY, UNSPECIFIED TRIGGER: Primary | ICD-10-CM

## 2019-07-22 RX ORDER — MONTELUKAST SODIUM 10 MG/1
TABLET ORAL
Qty: 30 TABLET | Refills: 0 | Status: SHIPPED | OUTPATIENT
Start: 2019-07-22 | End: 2019-07-24 | Stop reason: SDUPTHER

## 2019-07-22 RX ORDER — LEVOCETIRIZINE DIHYDROCHLORIDE 5 MG/1
TABLET, FILM COATED ORAL
Qty: 30 TABLET | Refills: 0 | Status: SHIPPED | OUTPATIENT
Start: 2019-07-22 | End: 2019-07-24 | Stop reason: SDUPTHER

## 2019-07-24 ENCOUNTER — HOSPITAL ENCOUNTER (OUTPATIENT)
Facility: HOSPITAL | Age: 44
Discharge: HOME | End: 2019-07-24
Payer: MEDICAID

## 2019-07-24 ENCOUNTER — OFFICE VISIT (OUTPATIENT)
Dept: FAMILY MEDICINE CLINIC | Age: 44
End: 2019-07-24
Payer: MEDICAID

## 2019-07-24 VITALS
WEIGHT: 202 LBS | SYSTOLIC BLOOD PRESSURE: 112 MMHG | BODY MASS INDEX: 38.14 KG/M2 | RESPIRATION RATE: 18 BRPM | HEART RATE: 73 BPM | DIASTOLIC BLOOD PRESSURE: 74 MMHG | HEIGHT: 61 IN | OXYGEN SATURATION: 98 %

## 2019-07-24 DIAGNOSIS — F41.9 ANXIETY: ICD-10-CM

## 2019-07-24 DIAGNOSIS — R53.83 FATIGUE, UNSPECIFIED TYPE: ICD-10-CM

## 2019-07-24 DIAGNOSIS — R63.5 WEIGHT GAIN: Primary | ICD-10-CM

## 2019-07-24 DIAGNOSIS — F32.A DEPRESSION, UNSPECIFIED DEPRESSION TYPE: ICD-10-CM

## 2019-07-24 DIAGNOSIS — E55.9 VITAMIN D DEFICIENCY: ICD-10-CM

## 2019-07-24 DIAGNOSIS — E03.9 HYPOTHYROIDISM, UNSPECIFIED TYPE: ICD-10-CM

## 2019-07-24 DIAGNOSIS — J30.2 SEASONAL ALLERGIES: ICD-10-CM

## 2019-07-24 LAB
HCT VFR BLD CALC: 37.9 % (ref 37–47)
HEMOGLOBIN: 12 G/DL (ref 11.5–16.5)
MCH RBC QN AUTO: 27.6 PG (ref 27–32)
MCHC RBC AUTO-ENTMCNC: 31.7 G/DL (ref 31–35)
MCV RBC AUTO: 87.1 FL (ref 80–100)
PDW BLD-RTO: 14.7 % (ref 11–16)
PLATELET # BLD: 296 K/UL (ref 150–400)
PMV BLD AUTO: 10.4 FL (ref 6–10)
RBC # BLD: 4.35 M/UL (ref 3.8–5.8)
TSH SERPL DL<=0.05 MIU/L-ACNC: 0.49 UIU/ML (ref 0.35–5.5)
VITAMIN D 25-HYDROXY: 30.7 (ref 32–100)
WBC # BLD: 8.2 K/UL (ref 4–11)

## 2019-07-24 PROCEDURE — 84443 ASSAY THYROID STIM HORMONE: CPT

## 2019-07-24 PROCEDURE — 99214 OFFICE O/P EST MOD 30 MIN: CPT | Performed by: FAMILY MEDICINE

## 2019-07-24 PROCEDURE — 82306 VITAMIN D 25 HYDROXY: CPT

## 2019-07-24 PROCEDURE — 84439 ASSAY OF FREE THYROXINE: CPT

## 2019-07-24 PROCEDURE — G8427 DOCREV CUR MEDS BY ELIG CLIN: HCPCS | Performed by: FAMILY MEDICINE

## 2019-07-24 PROCEDURE — 1036F TOBACCO NON-USER: CPT | Performed by: FAMILY MEDICINE

## 2019-07-24 PROCEDURE — G8417 CALC BMI ABV UP PARAM F/U: HCPCS | Performed by: FAMILY MEDICINE

## 2019-07-24 PROCEDURE — 85027 COMPLETE CBC AUTOMATED: CPT

## 2019-07-24 RX ORDER — PHENTERMINE HYDROCHLORIDE 37.5 MG/1
37.5 TABLET ORAL
Qty: 30 TABLET | Refills: 2 | Status: SHIPPED | OUTPATIENT
Start: 2019-07-24 | End: 2020-02-06 | Stop reason: SDUPTHER

## 2019-07-24 RX ORDER — ERGOCALCIFEROL 1.25 MG/1
CAPSULE ORAL
Qty: 4 CAPSULE | Refills: 2 | Status: CANCELLED | OUTPATIENT
Start: 2019-07-24

## 2019-07-24 RX ORDER — LEVOCETIRIZINE DIHYDROCHLORIDE 5 MG/1
TABLET, FILM COATED ORAL
Qty: 30 TABLET | Refills: 5 | Status: SHIPPED | OUTPATIENT
Start: 2019-07-24 | End: 2020-02-06 | Stop reason: SDUPTHER

## 2019-07-24 RX ORDER — CLONAZEPAM 0.5 MG/1
TABLET ORAL
Qty: 45 TABLET | Refills: 2 | Status: SHIPPED | OUTPATIENT
Start: 2019-07-24 | End: 2019-10-23 | Stop reason: SDUPTHER

## 2019-07-24 RX ORDER — MONTELUKAST SODIUM 10 MG/1
TABLET ORAL
Qty: 30 TABLET | Refills: 5 | Status: SHIPPED | OUTPATIENT
Start: 2019-07-24 | End: 2020-02-06 | Stop reason: SDUPTHER

## 2019-07-24 ASSESSMENT — ENCOUNTER SYMPTOMS: VOICE CHANGE: 1

## 2019-07-24 NOTE — PROGRESS NOTES
depression type     4. Hypothyroidism, unspecified type  TSH without Reflex    T4, FREE   5. Seasonal allergies     6. Vitamin D deficiency  VITAMIN D 25 HYDROXY   7. Fatigue, unspecified type  CBC        PLAN:  Orders Placed This Encounter   Medications    montelukast (SINGULAIR) 10 MG tablet     Sig: TAKE ONE TABLET BY MOUTH NIGHTLY     Dispense:  30 tablet     Refill:  5    levocetirizine (XYZAL) 5 MG tablet     Sig: TAKE ONE TABLET DAILY     Dispense:  30 tablet     Refill:  5    clonazePAM (KLONOPIN) 0.5 MG tablet     Sig: TAKE 1/2 TABLET EVERY MORNING AND 1 TABLET EVENING     Dispense:  45 tablet     Refill:  2    phentermine (ADIPEX-P) 37.5 MG tablet     Sig: Take 1 tablet by mouth every morning (before breakfast) for 30 days. Dispense:  30 tablet     Refill:  2           Medications Discontinued During This Encounter   Medication Reason    budesonide-formoterol (SYMBICORT) 160-4.5 MCG/ACT AERO Cost of medication    montelukast (SINGULAIR) 10 MG tablet REORDER    levocetirizine (XYZAL) 5 MG tablet REORDER    clonazePAM (KLONOPIN) 0.5 MG tablet REORDER    phentermine (ADIPEX-P) 37.5 MG tablet REORDER       Controlled Substances Monitoring: Attestation: The Prescription Monitoring Report for this patient was reviewed today. Amina Kolb MD)  Periodic Controlled Substance Monitoring: Possible medication side effects, risk of tolerance/dependence & alternative treatments discussed., No signs of potential drug abuse or diversion identified. , Assessed functional status., Obtaining appropriate analgesic effect of treatment. , Random urine drug screen sent today.  Amina Kolb MD)

## 2019-07-25 ENCOUNTER — NURSE ONLY (OUTPATIENT)
Dept: FAMILY MEDICINE CLINIC | Age: 44
End: 2019-07-25
Payer: MEDICAID

## 2019-07-25 DIAGNOSIS — J30.9 ALLERGIC RHINITIS, UNSPECIFIED SEASONALITY, UNSPECIFIED TRIGGER: Primary | ICD-10-CM

## 2019-07-25 LAB — T4 FREE: 1.69 NG/DL (ref 0.89–1.76)

## 2019-07-25 PROCEDURE — 95117 IMMUNOTHERAPY INJECTIONS: CPT | Performed by: FAMILY MEDICINE

## 2019-07-31 ENCOUNTER — TELEPHONE (OUTPATIENT)
Dept: FAMILY MEDICINE CLINIC | Age: 44
End: 2019-07-31

## 2019-07-31 NOTE — PROGRESS NOTES
Eddieopal Aylinaries was given 2 allergy injections per written orders. #1 0.35 ml SC Left arm  #2 0.35 ml SC right arm  No complications or reactions noted. Patient tolerated procedure well.

## 2019-08-01 ENCOUNTER — NURSE ONLY (OUTPATIENT)
Dept: FAMILY MEDICINE CLINIC | Age: 44
End: 2019-08-01
Payer: MEDICAID

## 2019-08-01 DIAGNOSIS — J30.9 ALLERGIC RHINITIS, UNSPECIFIED SEASONALITY, UNSPECIFIED TRIGGER: Primary | ICD-10-CM

## 2019-08-01 PROCEDURE — 95115 IMMUNOTHERAPY ONE INJECTION: CPT | Performed by: NURSE PRACTITIONER

## 2019-08-01 NOTE — PROGRESS NOTES
Administrations This Visit     ALLERGEN EXTRACT 0.35 mL     Admin Date  08/01/2019  15:12 Action  Given Dose  0.35 mL Route  Subcutaneous Site  Arm Left Administered By  Umm Amaro    Ordering Provider:  Tc Yip MD    Patient Supplied?:  Yes

## 2019-08-05 ENCOUNTER — NURSE ONLY (OUTPATIENT)
Dept: FAMILY MEDICINE CLINIC | Age: 44
End: 2019-08-05
Payer: MEDICAID

## 2019-08-05 DIAGNOSIS — J30.9 ALLERGIC RHINITIS, UNSPECIFIED SEASONALITY, UNSPECIFIED TRIGGER: Primary | ICD-10-CM

## 2019-08-05 PROCEDURE — 95115 IMMUNOTHERAPY ONE INJECTION: CPT | Performed by: FAMILY MEDICINE

## 2019-08-05 NOTE — PROGRESS NOTES
Administrations This Visit     ALLERGEN EXTRACT 0.35 mL     Admin Date  08/05/2019  15:47 Action  Given Dose  0.35 mL Route  Subcutaneous Site  Arm Right Administered By  Luis Forbes    Ordering Provider:  Zeinab Baker MD    Patient Supplied?:  Yes

## 2019-08-08 ENCOUNTER — NURSE ONLY (OUTPATIENT)
Dept: FAMILY MEDICINE CLINIC | Age: 44
End: 2019-08-08
Payer: MEDICAID

## 2019-08-08 DIAGNOSIS — J30.9 ALLERGIC RHINITIS, UNSPECIFIED SEASONALITY, UNSPECIFIED TRIGGER: Primary | ICD-10-CM

## 2019-08-08 PROCEDURE — 95115 IMMUNOTHERAPY ONE INJECTION: CPT | Performed by: FAMILY MEDICINE

## 2019-08-08 NOTE — PROGRESS NOTES
Shashi Cazares was given allergy injection per written order  #1 0.35 ml SC left arm  No complications or reactions noted. Patient tolerated procedure well.

## 2019-08-26 ENCOUNTER — NURSE ONLY (OUTPATIENT)
Dept: FAMILY MEDICINE CLINIC | Age: 44
End: 2019-08-26
Payer: MEDICAID

## 2019-08-26 DIAGNOSIS — E53.8 B12 DEFICIENCY: ICD-10-CM

## 2019-08-26 DIAGNOSIS — J30.9 ALLERGIC RHINITIS, UNSPECIFIED SEASONALITY, UNSPECIFIED TRIGGER: Primary | ICD-10-CM

## 2019-08-26 PROCEDURE — 95115 IMMUNOTHERAPY ONE INJECTION: CPT | Performed by: FAMILY MEDICINE

## 2019-08-26 PROCEDURE — 96372 THER/PROPH/DIAG INJ SC/IM: CPT | Performed by: FAMILY MEDICINE

## 2019-08-26 RX ORDER — CYANOCOBALAMIN 1000 UG/ML
1000 INJECTION INTRAMUSCULAR; SUBCUTANEOUS ONCE
Status: COMPLETED | OUTPATIENT
Start: 2019-08-26 | End: 2019-08-26

## 2019-08-26 RX ADMIN — CYANOCOBALAMIN 1000 MCG: 1000 INJECTION INTRAMUSCULAR; SUBCUTANEOUS at 16:33

## 2019-08-26 NOTE — PROGRESS NOTES
Administrations This Visit     ALLERGEN EXTRACT 0.3 mL     Admin Date  08/26/2019  16:33 Action  Given Dose  0.3 mL Route  Subcutaneous Site  Arm Right Administered By  Gibson Hendricks    Ordering Provider:  Zeeshan Mancuso MD    Patient Supplied?:  Yes          cyanocobalamin injection 1,000 mcg     Admin Date  08/26/2019  16:33 Action  Given Dose  1000 mcg Route  Intramuscular Site  Deltoid Right Administered By  Gibson Hendricks    Ordering Provider:  Zeeshan Mancuso MD    NDC:  1267-3345-03    Lot#:  [id number]    :  [employer]    Patient Supplied?:  No

## 2019-08-29 ENCOUNTER — NURSE ONLY (OUTPATIENT)
Dept: FAMILY MEDICINE CLINIC | Age: 44
End: 2019-08-29
Payer: MEDICAID

## 2019-08-29 DIAGNOSIS — J30.9 ALLERGIC RHINITIS, UNSPECIFIED SEASONALITY, UNSPECIFIED TRIGGER: Primary | ICD-10-CM

## 2019-08-29 PROCEDURE — 95115 IMMUNOTHERAPY ONE INJECTION: CPT | Performed by: FAMILY MEDICINE

## 2019-08-29 NOTE — PROGRESS NOTES
Administrations This Visit     ALLERGEN EXTRACT 0.25 mL     Admin Date  08/29/2019  16:02 Action  Given Dose  0.25 mL Route  Subcutaneous Site  Arm Left Administered By  Faye Gray    Ordering Provider:  Bharti Washington MD    Patient Supplied?:  Yes

## 2019-09-16 ENCOUNTER — NURSE ONLY (OUTPATIENT)
Dept: FAMILY MEDICINE CLINIC | Age: 44
End: 2019-09-16
Payer: MEDICAID

## 2019-09-16 DIAGNOSIS — J30.9 ALLERGIC RHINITIS, UNSPECIFIED SEASONALITY, UNSPECIFIED TRIGGER: Primary | ICD-10-CM

## 2019-09-16 PROCEDURE — 95115 IMMUNOTHERAPY ONE INJECTION: CPT | Performed by: FAMILY MEDICINE

## 2019-09-16 RX ORDER — ERGOCALCIFEROL 1.25 MG/1
CAPSULE ORAL
Qty: 4 CAPSULE | Refills: 0 | Status: SHIPPED | OUTPATIENT
Start: 2019-09-16 | End: 2019-10-16 | Stop reason: SDUPTHER

## 2019-09-16 RX ORDER — NAPROXEN 500 MG/1
500 TABLET ORAL 2 TIMES DAILY
Qty: 60 TABLET | Refills: 2 | Status: SHIPPED | OUTPATIENT
Start: 2019-09-16 | End: 2019-10-23 | Stop reason: SDUPTHER

## 2019-09-26 ENCOUNTER — NURSE ONLY (OUTPATIENT)
Dept: FAMILY MEDICINE CLINIC | Age: 44
End: 2019-09-26
Payer: MEDICAID

## 2019-09-26 DIAGNOSIS — J30.9 ALLERGIC RHINITIS, UNSPECIFIED SEASONALITY, UNSPECIFIED TRIGGER: Primary | ICD-10-CM

## 2019-09-26 PROCEDURE — 95115 IMMUNOTHERAPY ONE INJECTION: CPT | Performed by: FAMILY MEDICINE

## 2019-09-30 ENCOUNTER — NURSE ONLY (OUTPATIENT)
Dept: FAMILY MEDICINE CLINIC | Age: 44
End: 2019-09-30
Payer: MEDICAID

## 2019-09-30 DIAGNOSIS — J30.9 ALLERGIC RHINITIS, UNSPECIFIED SEASONALITY, UNSPECIFIED TRIGGER: Primary | ICD-10-CM

## 2019-09-30 PROCEDURE — 95115 IMMUNOTHERAPY ONE INJECTION: CPT | Performed by: FAMILY MEDICINE

## 2019-10-03 ENCOUNTER — NURSE ONLY (OUTPATIENT)
Dept: FAMILY MEDICINE CLINIC | Age: 44
End: 2019-10-03
Payer: MEDICAID

## 2019-10-03 DIAGNOSIS — J30.9 ALLERGIC RHINITIS, UNSPECIFIED SEASONALITY, UNSPECIFIED TRIGGER: Primary | ICD-10-CM

## 2019-10-03 DIAGNOSIS — E53.8 B12 DEFICIENCY: ICD-10-CM

## 2019-10-03 PROCEDURE — 96372 THER/PROPH/DIAG INJ SC/IM: CPT | Performed by: FAMILY MEDICINE

## 2019-10-03 RX ORDER — CYANOCOBALAMIN 1000 UG/ML
1000 INJECTION INTRAMUSCULAR; SUBCUTANEOUS ONCE
Status: COMPLETED | OUTPATIENT
Start: 2019-10-03 | End: 2019-10-03

## 2019-10-03 RX ADMIN — CYANOCOBALAMIN 1000 MCG: 1000 INJECTION INTRAMUSCULAR; SUBCUTANEOUS at 18:20

## 2019-10-16 RX ORDER — ERGOCALCIFEROL 1.25 MG/1
CAPSULE ORAL
Qty: 4 CAPSULE | Refills: 0 | Status: SHIPPED | OUTPATIENT
Start: 2019-10-16 | End: 2019-11-15 | Stop reason: SDUPTHER

## 2019-10-21 ENCOUNTER — NURSE ONLY (OUTPATIENT)
Dept: FAMILY MEDICINE CLINIC | Age: 44
End: 2019-10-21
Payer: MEDICAID

## 2019-10-21 DIAGNOSIS — J30.9 ALLERGIC RHINITIS, UNSPECIFIED SEASONALITY, UNSPECIFIED TRIGGER: Primary | ICD-10-CM

## 2019-10-21 PROCEDURE — 95115 IMMUNOTHERAPY ONE INJECTION: CPT | Performed by: FAMILY MEDICINE

## 2019-10-23 ENCOUNTER — OFFICE VISIT (OUTPATIENT)
Dept: FAMILY MEDICINE CLINIC | Age: 44
End: 2019-10-23
Payer: MEDICAID

## 2019-10-23 ENCOUNTER — HOSPITAL ENCOUNTER (OUTPATIENT)
Facility: HOSPITAL | Age: 44
Discharge: HOME | End: 2019-10-23
Payer: MEDICAID

## 2019-10-23 VITALS
OXYGEN SATURATION: 98 % | HEART RATE: 80 BPM | DIASTOLIC BLOOD PRESSURE: 72 MMHG | RESPIRATION RATE: 20 BRPM | WEIGHT: 202 LBS | SYSTOLIC BLOOD PRESSURE: 132 MMHG | BODY MASS INDEX: 38.14 KG/M2 | HEIGHT: 61 IN

## 2019-10-23 DIAGNOSIS — F41.9 ANXIETY: ICD-10-CM

## 2019-10-23 DIAGNOSIS — E03.9 HYPOTHYROIDISM, UNSPECIFIED TYPE: ICD-10-CM

## 2019-10-23 DIAGNOSIS — R53.83 FATIGUE, UNSPECIFIED TYPE: ICD-10-CM

## 2019-10-23 DIAGNOSIS — K21.9 GASTROESOPHAGEAL REFLUX DISEASE, ESOPHAGITIS PRESENCE NOT SPECIFIED: ICD-10-CM

## 2019-10-23 DIAGNOSIS — N64.4 BREAST TENDERNESS IN FEMALE: ICD-10-CM

## 2019-10-23 DIAGNOSIS — E03.9 HYPOTHYROIDISM, UNSPECIFIED TYPE: Primary | ICD-10-CM

## 2019-10-23 LAB
T4 FREE: 1.57 NG/DL (ref 0.89–1.76)
TSH SERPL DL<=0.05 MIU/L-ACNC: 7.68 UIU/ML (ref 0.35–5.5)

## 2019-10-23 PROCEDURE — 1036F TOBACCO NON-USER: CPT | Performed by: FAMILY MEDICINE

## 2019-10-23 PROCEDURE — G8484 FLU IMMUNIZE NO ADMIN: HCPCS | Performed by: FAMILY MEDICINE

## 2019-10-23 PROCEDURE — 99214 OFFICE O/P EST MOD 30 MIN: CPT | Performed by: FAMILY MEDICINE

## 2019-10-23 PROCEDURE — 84443 ASSAY THYROID STIM HORMONE: CPT

## 2019-10-23 PROCEDURE — G8427 DOCREV CUR MEDS BY ELIG CLIN: HCPCS | Performed by: FAMILY MEDICINE

## 2019-10-23 PROCEDURE — 84439 ASSAY OF FREE THYROXINE: CPT

## 2019-10-23 PROCEDURE — G8417 CALC BMI ABV UP PARAM F/U: HCPCS | Performed by: FAMILY MEDICINE

## 2019-10-23 RX ORDER — NAPROXEN 500 MG/1
500 TABLET ORAL 2 TIMES DAILY
Qty: 60 TABLET | Refills: 2 | Status: SHIPPED | OUTPATIENT
Start: 2019-10-23 | End: 2020-02-06 | Stop reason: SDUPTHER

## 2019-10-23 RX ORDER — FAMOTIDINE 40 MG/1
TABLET, FILM COATED ORAL
Qty: 60 TABLET | Refills: 5 | Status: CANCELLED | OUTPATIENT
Start: 2019-10-23

## 2019-10-23 RX ORDER — DEXLANSOPRAZOLE 60 MG/1
CAPSULE, DELAYED RELEASE ORAL
Qty: 30 CAPSULE | Refills: 5 | Status: SHIPPED | OUTPATIENT
Start: 2019-10-23 | End: 2020-05-08 | Stop reason: SDUPTHER

## 2019-10-23 RX ORDER — FUROSEMIDE 20 MG/1
TABLET ORAL
Qty: 30 TABLET | Refills: 5 | Status: SHIPPED | OUTPATIENT
Start: 2019-10-23 | End: 2020-05-08 | Stop reason: SDUPTHER

## 2019-10-23 RX ORDER — LEVOTHYROXINE SODIUM 0.1 MG/1
TABLET ORAL
Qty: 30 TABLET | Refills: 5 | Status: SHIPPED | OUTPATIENT
Start: 2019-10-23 | End: 2019-11-01

## 2019-10-23 RX ORDER — CLONAZEPAM 0.5 MG/1
TABLET ORAL
Qty: 45 TABLET | Refills: 2 | Status: SHIPPED | OUTPATIENT
Start: 2019-10-23 | End: 2020-02-06 | Stop reason: SDUPTHER

## 2019-10-23 RX ORDER — BUPROPION HYDROCHLORIDE 300 MG/1
TABLET ORAL
Qty: 30 TABLET | Refills: 5 | Status: SHIPPED | OUTPATIENT
Start: 2019-10-23 | End: 2020-05-08 | Stop reason: SDUPTHER

## 2019-10-23 RX ORDER — OXYBUTYNIN CHLORIDE 10 MG/1
TABLET, EXTENDED RELEASE ORAL
Qty: 30 TABLET | Refills: 5 | Status: SHIPPED | OUTPATIENT
Start: 2019-10-23 | End: 2020-05-08

## 2019-10-23 ASSESSMENT — ENCOUNTER SYMPTOMS: VOICE CHANGE: 1

## 2019-10-25 ENCOUNTER — HOSPITAL ENCOUNTER (OUTPATIENT)
Dept: ULTRASOUND IMAGING | Facility: HOSPITAL | Age: 44
Discharge: HOME | End: 2019-10-25
Payer: MEDICAID

## 2019-10-25 ENCOUNTER — HOSPITAL ENCOUNTER (OUTPATIENT)
Dept: MAMMOGRAPHY | Facility: HOSPITAL | Age: 44
Discharge: HOME | End: 2019-10-25
Payer: MEDICAID

## 2019-10-25 DIAGNOSIS — N63.10 BREAST MASS, RIGHT: ICD-10-CM

## 2019-10-25 DIAGNOSIS — N64.4 BREAST TENDERNESS IN FEMALE: ICD-10-CM

## 2019-10-25 DIAGNOSIS — N63.20 LEFT BREAST MASS: ICD-10-CM

## 2019-10-25 PROCEDURE — 76642 ULTRASOUND BREAST LIMITED: CPT

## 2019-10-25 PROCEDURE — G0279 TOMOSYNTHESIS, MAMMO: HCPCS

## 2019-11-01 RX ORDER — LEVOTHYROXINE SODIUM 112 UG/1
112 TABLET ORAL DAILY
Qty: 30 TABLET | Refills: 5 | Status: SHIPPED | OUTPATIENT
Start: 2019-11-01 | End: 2020-02-12

## 2019-11-04 ENCOUNTER — NURSE ONLY (OUTPATIENT)
Dept: FAMILY MEDICINE CLINIC | Age: 44
End: 2019-11-04
Payer: MEDICAID

## 2019-11-04 DIAGNOSIS — J30.9 ALLERGIC RHINITIS, UNSPECIFIED SEASONALITY, UNSPECIFIED TRIGGER: Primary | ICD-10-CM

## 2019-11-04 PROCEDURE — 95115 IMMUNOTHERAPY ONE INJECTION: CPT | Performed by: FAMILY MEDICINE

## 2019-11-07 ENCOUNTER — NURSE ONLY (OUTPATIENT)
Dept: FAMILY MEDICINE CLINIC | Age: 44
End: 2019-11-07
Payer: MEDICAID

## 2019-11-07 DIAGNOSIS — J30.9 ALLERGIC RHINITIS, UNSPECIFIED SEASONALITY, UNSPECIFIED TRIGGER: Primary | ICD-10-CM

## 2019-11-07 PROCEDURE — 95115 IMMUNOTHERAPY ONE INJECTION: CPT | Performed by: FAMILY MEDICINE

## 2019-11-14 ENCOUNTER — NURSE ONLY (OUTPATIENT)
Dept: FAMILY MEDICINE CLINIC | Age: 44
End: 2019-11-14

## 2019-11-14 DIAGNOSIS — J30.9 ALLERGIC RHINITIS, UNSPECIFIED SEASONALITY, UNSPECIFIED TRIGGER: Primary | ICD-10-CM

## 2019-11-18 RX ORDER — ERGOCALCIFEROL 1.25 MG/1
CAPSULE ORAL
Qty: 4 CAPSULE | Refills: 0 | Status: SHIPPED | OUTPATIENT
Start: 2019-11-18 | End: 2020-02-06 | Stop reason: SDUPTHER

## 2019-12-16 RX ORDER — FAMOTIDINE 40 MG/1
TABLET, FILM COATED ORAL
Qty: 60 TABLET | Refills: 5 | Status: SHIPPED | OUTPATIENT
Start: 2019-12-16 | End: 2020-02-06 | Stop reason: SDUPTHER

## 2019-12-16 RX ORDER — FLUTICASONE PROPIONATE 50 MCG
SPRAY, SUSPENSION (ML) NASAL
Qty: 16 G | Refills: 5 | Status: SHIPPED | OUTPATIENT
Start: 2019-12-16 | End: 2020-06-22

## 2020-02-06 ENCOUNTER — HOSPITAL ENCOUNTER (OUTPATIENT)
Facility: HOSPITAL | Age: 45
Discharge: HOME | End: 2020-02-06
Payer: MEDICAID

## 2020-02-06 ENCOUNTER — OFFICE VISIT (OUTPATIENT)
Dept: FAMILY MEDICINE CLINIC | Age: 45
End: 2020-02-06
Payer: MEDICAID

## 2020-02-06 VITALS
WEIGHT: 218 LBS | DIASTOLIC BLOOD PRESSURE: 76 MMHG | OXYGEN SATURATION: 98 % | BODY MASS INDEX: 41.16 KG/M2 | HEIGHT: 61 IN | SYSTOLIC BLOOD PRESSURE: 128 MMHG | RESPIRATION RATE: 18 BRPM | HEART RATE: 84 BPM

## 2020-02-06 LAB
A/G RATIO: 1.6 (ref 0.8–2)
ALBUMIN SERPL-MCNC: 3.9 G/DL (ref 3.4–4.8)
ALP BLD-CCNC: 105 U/L (ref 25–100)
ALT SERPL-CCNC: 6 U/L (ref 4–36)
ANION GAP SERPL CALCULATED.3IONS-SCNC: 10 MMOL/L (ref 3–16)
AST SERPL-CCNC: 10 U/L (ref 8–33)
BILIRUB SERPL-MCNC: <0.2 MG/DL (ref 0.3–1.2)
BUN BLDV-MCNC: 13 MG/DL (ref 6–20)
CALCIUM SERPL-MCNC: 8.7 MG/DL (ref 8.5–10.5)
CHLORIDE BLD-SCNC: 104 MMOL/L (ref 98–107)
CHOLESTEROL, TOTAL: 159 MG/DL (ref 0–200)
CO2: 26 MMOL/L (ref 20–30)
CREAT SERPL-MCNC: 0.7 MG/DL (ref 0.4–1.2)
GFR AFRICAN AMERICAN: >59
GFR NON-AFRICAN AMERICAN: >60
GLOBULIN: 2.4 G/DL
GLUCOSE BLD-MCNC: 126 MG/DL (ref 74–106)
HBA1C MFR BLD: 5.4 %
HDLC SERPL-MCNC: 54 MG/DL (ref 40–60)
LDL CHOLESTEROL CALCULATED: 73 MG/DL
POTASSIUM SERPL-SCNC: 3.8 MMOL/L (ref 3.4–5.1)
SODIUM BLD-SCNC: 140 MMOL/L (ref 136–145)
T4 FREE: 1.66 NG/DL (ref 0.89–1.76)
TOTAL PROTEIN: 6.3 G/DL (ref 6.4–8.3)
TRIGL SERPL-MCNC: 162 MG/DL (ref 0–249)
TSH SERPL DL<=0.05 MIU/L-ACNC: 6.42 UIU/ML (ref 0.35–5.5)
VITAMIN D 25-HYDROXY: 18.6 (ref 32–100)
VLDLC SERPL CALC-MCNC: 32 MG/DL

## 2020-02-06 PROCEDURE — G8427 DOCREV CUR MEDS BY ELIG CLIN: HCPCS | Performed by: FAMILY MEDICINE

## 2020-02-06 PROCEDURE — 1036F TOBACCO NON-USER: CPT | Performed by: FAMILY MEDICINE

## 2020-02-06 PROCEDURE — G8417 CALC BMI ABV UP PARAM F/U: HCPCS | Performed by: FAMILY MEDICINE

## 2020-02-06 PROCEDURE — 80061 LIPID PANEL: CPT

## 2020-02-06 PROCEDURE — 36415 COLL VENOUS BLD VENIPUNCTURE: CPT

## 2020-02-06 PROCEDURE — 84439 ASSAY OF FREE THYROXINE: CPT

## 2020-02-06 PROCEDURE — 83036 HEMOGLOBIN GLYCOSYLATED A1C: CPT

## 2020-02-06 PROCEDURE — 99214 OFFICE O/P EST MOD 30 MIN: CPT | Performed by: FAMILY MEDICINE

## 2020-02-06 PROCEDURE — 84443 ASSAY THYROID STIM HORMONE: CPT

## 2020-02-06 PROCEDURE — 82306 VITAMIN D 25 HYDROXY: CPT

## 2020-02-06 PROCEDURE — 80053 COMPREHEN METABOLIC PANEL: CPT

## 2020-02-06 PROCEDURE — G8484 FLU IMMUNIZE NO ADMIN: HCPCS | Performed by: FAMILY MEDICINE

## 2020-02-06 RX ORDER — NAPROXEN 500 MG/1
500 TABLET ORAL 2 TIMES DAILY
Qty: 60 TABLET | Refills: 2 | Status: SHIPPED | OUTPATIENT
Start: 2020-02-06 | End: 2020-05-08 | Stop reason: SDUPTHER

## 2020-02-06 RX ORDER — FAMOTIDINE 40 MG/1
TABLET, FILM COATED ORAL
Qty: 60 TABLET | Refills: 5 | Status: SHIPPED | OUTPATIENT
Start: 2020-02-06 | End: 2020-05-08 | Stop reason: SDUPTHER

## 2020-02-06 RX ORDER — PHENTERMINE HYDROCHLORIDE 37.5 MG/1
37.5 TABLET ORAL
Qty: 30 TABLET | Refills: 2 | Status: SHIPPED | OUTPATIENT
Start: 2020-02-06 | End: 2020-03-07

## 2020-02-06 RX ORDER — ERGOCALCIFEROL 1.25 MG/1
CAPSULE ORAL
Qty: 4 CAPSULE | Refills: 2 | Status: SHIPPED | OUTPATIENT
Start: 2020-02-06 | End: 2020-04-27

## 2020-02-06 RX ORDER — MONTELUKAST SODIUM 10 MG/1
TABLET ORAL
Qty: 30 TABLET | Refills: 5 | Status: SHIPPED | OUTPATIENT
Start: 2020-02-06 | End: 2020-05-08 | Stop reason: SDUPTHER

## 2020-02-06 RX ORDER — CLONAZEPAM 0.5 MG/1
TABLET ORAL
Qty: 45 TABLET | Refills: 2 | Status: SHIPPED | OUTPATIENT
Start: 2020-02-06 | End: 2020-05-08 | Stop reason: SDUPTHER

## 2020-02-06 RX ORDER — LEVOCETIRIZINE DIHYDROCHLORIDE 5 MG/1
TABLET, FILM COATED ORAL
Qty: 30 TABLET | Refills: 5 | Status: SHIPPED | OUTPATIENT
Start: 2020-02-06 | End: 2020-05-08 | Stop reason: SDUPTHER

## 2020-02-06 ASSESSMENT — ENCOUNTER SYMPTOMS: VOICE CHANGE: 1

## 2020-02-06 NOTE — PROGRESS NOTES
SUBJECTIVE:    Patient ID: Romana President is a 39 y.o. female. Chief Complaint   Patient presents with    Hypothyroidism     f/u       HPI: she has been continuing to see ortho. She is out of the boot and into a brace. She says her insurance wont let her do more pt. She is trying to get more strenght and flexiblity in the left ankle. She has been back to see pain management. She says they started her on nortryplyine. She is not doing well with that. She is making her feel bad and having bad headaches. She is concerned about her weight gain. She has not been taking the adipex in the the last couple months like she had been. She is not having any chest pain or increase in sob. She is drinking more water. She is not drinking as much pop. She is not having any chest pain or increase in sob. She has had increase in low back pain. She is going ot have injections in her low back at pain management. Review of Systems   HENT: Positive for voice change. Musculoskeletal: Positive for gait problem. Psychiatric/Behavioral: The patient is nervous/anxious. All other systems reviewed and are negative. OBJECTIVE:  /76   Pulse 84   Resp 18   Ht 5' 1\" (1.549 m)   Wt 218 lb (98.9 kg)   SpO2 98% Comment: room air  Breastfeeding No   BMI 41.19 kg/m²    Wt Readings from Last 3 Encounters:   02/06/20 218 lb (98.9 kg)   10/23/19 202 lb (91.6 kg)   07/24/19 202 lb (91.6 kg)     BP Readings from Last 3 Encounters:   02/06/20 128/76   10/23/19 132/72   07/24/19 112/74      Pulse Readings from Last 3 Encounters:   02/06/20 84   10/23/19 80   07/24/19 73     Body mass index is 41.19 kg/m². Resp Readings from Last 3 Encounters:   02/06/20 18   10/23/19 20   07/24/19 18     Past medical, surgical, family and social history were reviewed and updated with the patient. Physical Exam  Vitals signs and nursing note reviewed. Constitutional:       Appearance: Normal appearance. She is well-developed. Results   Component Value Date    TSH 7.68 (H) 10/23/2019       ASSESSMENT:    Diagnosis Orders   1. Chronic pain of left ankle     2. Anxiety  clonazePAM (KLONOPIN) 0.5 MG tablet   3. Weight gain  phentermine (ADIPEX-P) 37.5 MG tablet   4. Tarsal tunnel syndrome of left side     5. Depression, unspecified depression type     6. Hypothyroidism, unspecified type  TSH without Reflex    T4, FREE   7. Gastroesophageal reflux disease, esophagitis presence not specified     8. Vitamin D deficiency  VITAMIN D 25 HYDROXY   9. Screening, lipid  LIPID PANEL   10. Elevated blood sugar  COMPREHENSIVE METABOLIC PANEL    HEMOGLOBIN A1C        PLAN:  Orders Placed This Encounter   Medications    vitamin D (ERGOCALCIFEROL) 1.25 MG (64836 UT) CAPS capsule     Sig: TAKE ONE CAPSULE ONCE WEEKLY     Dispense:  4 capsule     Refill:  2    clonazePAM (KLONOPIN) 0.5 MG tablet     Sig: TAKE 1/2 TABLET EVERY MORNING AND 1 TABLET EVENING     Dispense:  45 tablet     Refill:  2    naproxen (NAPROSYN) 500 MG tablet     Sig: Take 1 tablet by mouth 2 times daily     Dispense:  60 tablet     Refill:  2    montelukast (SINGULAIR) 10 MG tablet     Sig: TAKE ONE TABLET BY MOUTH NIGHTLY     Dispense:  30 tablet     Refill:  5    levocetirizine (XYZAL) 5 MG tablet     Sig: TAKE ONE TABLET DAILY     Dispense:  30 tablet     Refill:  5    phentermine (ADIPEX-P) 37.5 MG tablet     Sig: Take 1 tablet by mouth every morning (before breakfast) for 30 days. Dispense:  30 tablet     Refill:  2        Medications Discontinued During This Encounter   Medication Reason    vitamin D (ERGOCALCIFEROL) 1.25 MG (91107 UT) CAPS capsule REORDER    clonazePAM (KLONOPIN) 0.5 MG tablet REORDER    naproxen (NAPROSYN) 500 MG tablet REORDER    montelukast (SINGULAIR) 10 MG tablet REORDER    levocetirizine (XYZAL) 5 MG tablet REORDER    phentermine (ADIPEX-P) 37.5 MG tablet REORDER       Controlled Substances Monitoring:      Please note:  This chart was

## 2020-02-06 NOTE — PROGRESS NOTES
Have you seen any other physician or provider since your last visit Yes, Orthopedic    Have you had any other diagnostic tests since your last visit? no    Have you changed or stopped any medications since your last visit? no

## 2020-02-12 RX ORDER — LEVOTHYROXINE SODIUM 0.12 MG/1
125 TABLET ORAL DAILY
Qty: 30 TABLET | Refills: 2 | Status: SHIPPED | OUTPATIENT
Start: 2020-02-12 | End: 2020-05-08 | Stop reason: SDUPTHER

## 2020-04-27 RX ORDER — ERGOCALCIFEROL 1.25 MG/1
CAPSULE ORAL
Qty: 4 CAPSULE | Refills: 0 | Status: SHIPPED | OUTPATIENT
Start: 2020-04-27 | End: 2020-05-13 | Stop reason: SDUPTHER

## 2020-05-08 ENCOUNTER — VIRTUAL VISIT (OUTPATIENT)
Dept: FAMILY MEDICINE CLINIC | Age: 45
End: 2020-05-08
Payer: MEDICAID

## 2020-05-08 PROCEDURE — 99442 PR PHYS/QHP TELEPHONE EVALUATION 11-20 MIN: CPT | Performed by: FAMILY MEDICINE

## 2020-05-08 RX ORDER — BUPROPION HYDROCHLORIDE 300 MG/1
TABLET ORAL
Qty: 90 TABLET | Refills: 3 | Status: SHIPPED | OUTPATIENT
Start: 2020-05-08 | End: 2021-02-04 | Stop reason: SDUPTHER

## 2020-05-08 RX ORDER — PHENTERMINE HYDROCHLORIDE 37.5 MG/1
37.5 TABLET ORAL
Qty: 90 TABLET | Refills: 0 | Status: SHIPPED | OUTPATIENT
Start: 2020-05-08 | End: 2020-08-18

## 2020-05-08 RX ORDER — MONTELUKAST SODIUM 10 MG/1
TABLET ORAL
Qty: 90 TABLET | Refills: 3 | Status: SHIPPED | OUTPATIENT
Start: 2020-05-08 | End: 2021-02-04 | Stop reason: SDUPTHER

## 2020-05-08 RX ORDER — CYANOCOBALAMIN 1000 UG/ML
1000 INJECTION INTRAMUSCULAR; SUBCUTANEOUS ONCE
Status: DISCONTINUED | OUTPATIENT
Start: 2020-05-08 | End: 2020-11-04

## 2020-05-08 RX ORDER — FUROSEMIDE 20 MG/1
TABLET ORAL
Qty: 90 TABLET | Refills: 3 | Status: SHIPPED | OUTPATIENT
Start: 2020-05-08 | End: 2021-02-04 | Stop reason: SDUPTHER

## 2020-05-08 RX ORDER — DEXLANSOPRAZOLE 60 MG/1
CAPSULE, DELAYED RELEASE ORAL
Qty: 90 CAPSULE | Refills: 3 | Status: SHIPPED | OUTPATIENT
Start: 2020-05-08 | End: 2020-11-04 | Stop reason: SDUPTHER

## 2020-05-08 RX ORDER — LEVOTHYROXINE SODIUM 0.12 MG/1
125 TABLET ORAL DAILY
Qty: 90 TABLET | Refills: 3 | Status: SHIPPED | OUTPATIENT
Start: 2020-05-08 | End: 2020-05-13

## 2020-05-08 RX ORDER — LEVOCETIRIZINE DIHYDROCHLORIDE 5 MG/1
TABLET, FILM COATED ORAL
Qty: 90 TABLET | Refills: 3 | Status: SHIPPED | OUTPATIENT
Start: 2020-05-08 | End: 2021-02-04 | Stop reason: SDUPTHER

## 2020-05-08 RX ORDER — NAPROXEN 500 MG/1
500 TABLET ORAL 2 TIMES DAILY
Qty: 180 TABLET | Refills: 3 | Status: SHIPPED | OUTPATIENT
Start: 2020-05-08 | End: 2021-02-04 | Stop reason: SDUPTHER

## 2020-05-08 RX ORDER — CLONAZEPAM 0.5 MG/1
TABLET ORAL
Qty: 135 TABLET | Refills: 0 | Status: SHIPPED | OUTPATIENT
Start: 2020-05-08 | End: 2020-06-22

## 2020-05-08 RX ORDER — TIZANIDINE 4 MG/1
4 TABLET ORAL 3 TIMES DAILY
Qty: 90 TABLET | Refills: 2 | Status: SHIPPED | OUTPATIENT
Start: 2020-05-08 | End: 2020-08-02

## 2020-05-08 RX ORDER — FAMOTIDINE 40 MG/1
TABLET, FILM COATED ORAL
Qty: 180 TABLET | Refills: 3 | Status: SHIPPED | OUTPATIENT
Start: 2020-05-08 | End: 2021-02-04 | Stop reason: SDUPTHER

## 2020-05-08 ASSESSMENT — ENCOUNTER SYMPTOMS: VOICE CHANGE: 1

## 2020-05-08 NOTE — PROGRESS NOTES
SUBJECTIVE:    Patient ID: Carlos Zarate is a 39 y.o. female. Chief Complaint   Patient presents with    Foot Pain    Leg Pain   Carlos Zarate is a 39 y.o. female evaluated via telephone on 5/8/2020. Consent:  She and/or health care decision maker is aware that that she may receive a bill for this telephone service, depending on her insurance coverage, and has provided verbal consent to proceed: yes      Documentation:  I communicated with the patient and/or health care decision maker about her fatigue, her thyroid, her medication. Her left leg and foot pain   Details of this discussion including any medical advice provided: to her      I affirm this is a Patient Initiated Episode with a Patient who has not had a related appointment within my department in the past 7 days or scheduled within the next 24 hours. Patient identification was verified at the start of the visit: yes    Total Time: 11 minutes    Note: not billable if this call serves to triage the patient into an appointment for the relevant concern      Feliberto Olivia     HPI: Telephone conversation with her today about her thyroid issues. She is got have some follow-up blood work for her thyroid. She still having some fatigue. She is denying any chest pain or shortness of breath. She is having some significant pain in her right foot. She says that is been worse recently. She says after she had the surgery on the left foot. She is having more right foot pain. She says nothing she is doing is making it much better. She does complain of some what sounds like neuropathic type pains. She is spoken with pain management about this. They have offered to try to do a couple things they do think that it may be a pinched nerve. She is not sure whether they are talking about in her back or her leg. She says that she is having some pain in the leg. She is having some pain in the foot. She has not had any falls or injuries.   She is continue to try to work on her weight. She says she is losing a little bit at a time. She is not have any medication problems that she can tell. Review of Systems   HENT: Positive for voice change. Musculoskeletal: Positive for gait problem. Psychiatric/Behavioral: The patient is nervous/anxious. All other systems reviewed and are negative. OBJECTIVE:  There were no vitals taken for this visit. Wt Readings from Last 3 Encounters:   02/06/20 218 lb (98.9 kg)   10/23/19 202 lb (91.6 kg)   07/24/19 202 lb (91.6 kg)     BP Readings from Last 3 Encounters:   02/06/20 128/76   10/23/19 132/72   07/24/19 112/74      Pulse Readings from Last 3 Encounters:   02/06/20 84   10/23/19 80   07/24/19 73     There is no height or weight on file to calculate BMI. Resp Readings from Last 3 Encounters:   02/06/20 18   10/23/19 20   07/24/19 18     Past medical, surgical, family and social history were reviewed and updated with the patient. No results found for requested labs within last 30 days. Hemoglobin A1C (%)   Date Value   02/06/2020 5.4     LDL Calculated (mg/dL)   Date Value   02/06/2020 73       Lab Results   Component Value Date    WBC 8.2 07/24/2019    NEUTROABS 5.0 04/24/2017    HGB 12.0 07/24/2019    HCT 37.9 07/24/2019    MCV 87.1 07/24/2019     07/24/2019     Lab Results   Component Value Date    TSH 6.42 (H) 02/06/2020       ASSESSMENT:    Diagnosis Orders   1. Vitamin D deficiency  VITAMIN D 25 HYDROXY   2. Depression, unspecified depression type     3. B12 deficiency  VITAMIN B12 & FOLATE   4. Seasonal allergies     5. Hypothyroidism, unspecified type  TSH without Reflex    T4, FREE   6. Gastroesophageal reflux disease, esophagitis presence not specified  COMPREHENSIVE METABOLIC PANEL   7. Anxiety  clonazePAM (KLONOPIN) 0.5 MG tablet   8. Weight gain  phentermine (ADIPEX-P) 37.5 MG tablet   9.  Right foot pain          PLAN:  Orders Placed This Encounter   Medications     montelukast (SINGULAIR) 10 MG tablet REORDER    naproxen (NAPROSYN) 500 MG tablet REORDER    clonazePAM (KLONOPIN) 0.5 MG tablet REORDER    levothyroxine (SYNTHROID) 125 MCG tablet REORDER       Controlled Substances Monitoring:      Please note: This chart was generated using Dragon dictation software. Although every effort was made to ensure the accuracy of this automated transcription, some errors in transcription may have occurred.

## 2020-05-12 ENCOUNTER — HOSPITAL ENCOUNTER (OUTPATIENT)
Facility: HOSPITAL | Age: 45
Discharge: HOME | End: 2020-05-12
Payer: MEDICAID

## 2020-05-12 ENCOUNTER — NURSE ONLY (OUTPATIENT)
Dept: FAMILY MEDICINE CLINIC | Age: 45
End: 2020-05-12
Payer: MEDICAID

## 2020-05-12 LAB
A/G RATIO: 1.9 (ref 0.8–2)
ALBUMIN SERPL-MCNC: 4.1 G/DL (ref 3.4–4.8)
ALP BLD-CCNC: 101 U/L (ref 25–100)
ALT SERPL-CCNC: 7 U/L (ref 4–36)
ANION GAP SERPL CALCULATED.3IONS-SCNC: 10 MMOL/L (ref 3–16)
AST SERPL-CCNC: 14 U/L (ref 8–33)
BILIRUB SERPL-MCNC: 0.3 MG/DL (ref 0.3–1.2)
BUN BLDV-MCNC: 13 MG/DL (ref 6–20)
CALCIUM SERPL-MCNC: 9.2 MG/DL (ref 8.5–10.5)
CHLORIDE BLD-SCNC: 106 MMOL/L (ref 98–107)
CO2: 25 MMOL/L (ref 20–30)
CREAT SERPL-MCNC: 0.8 MG/DL (ref 0.4–1.2)
FOLATE: 8.74 NG/ML
GFR AFRICAN AMERICAN: >59
GFR NON-AFRICAN AMERICAN: >60
GLOBULIN: 2.2 G/DL
GLUCOSE BLD-MCNC: 101 MG/DL (ref 74–106)
POTASSIUM SERPL-SCNC: 4.2 MMOL/L (ref 3.4–5.1)
SODIUM BLD-SCNC: 141 MMOL/L (ref 136–145)
T4 FREE: 1.76 NG/DL (ref 0.89–1.76)
TOTAL PROTEIN: 6.3 G/DL (ref 6.4–8.3)
TSH SERPL DL<=0.05 MIU/L-ACNC: 6.58 UIU/ML (ref 0.35–5.5)
VITAMIN B-12: 585 PG/ML (ref 211–911)
VITAMIN D 25-HYDROXY: 21.1 (ref 32–100)

## 2020-05-12 PROCEDURE — 84439 ASSAY OF FREE THYROXINE: CPT

## 2020-05-12 PROCEDURE — 82746 ASSAY OF FOLIC ACID SERUM: CPT

## 2020-05-12 PROCEDURE — 84443 ASSAY THYROID STIM HORMONE: CPT

## 2020-05-12 PROCEDURE — 80053 COMPREHEN METABOLIC PANEL: CPT

## 2020-05-12 PROCEDURE — 96372 THER/PROPH/DIAG INJ SC/IM: CPT | Performed by: FAMILY MEDICINE

## 2020-05-12 PROCEDURE — 82306 VITAMIN D 25 HYDROXY: CPT

## 2020-05-12 PROCEDURE — 82607 VITAMIN B-12: CPT

## 2020-05-12 RX ORDER — CYANOCOBALAMIN 1000 UG/ML
1000 INJECTION INTRAMUSCULAR; SUBCUTANEOUS ONCE
Status: COMPLETED | OUTPATIENT
Start: 2020-05-12 | End: 2020-05-12

## 2020-05-12 RX ADMIN — CYANOCOBALAMIN 1000 MCG: 1000 INJECTION INTRAMUSCULAR; SUBCUTANEOUS at 14:45

## 2020-05-13 RX ORDER — LEVOTHYROXINE SODIUM 0.15 MG/1
150 TABLET ORAL DAILY
Qty: 30 TABLET | Refills: 2 | Status: SHIPPED | OUTPATIENT
Start: 2020-05-13 | End: 2020-08-18

## 2020-05-13 RX ORDER — ERGOCALCIFEROL 1.25 MG/1
CAPSULE ORAL
Qty: 4 CAPSULE | Refills: 2 | Status: SHIPPED | OUTPATIENT
Start: 2020-05-13 | End: 2020-09-02

## 2020-05-18 ENCOUNTER — VIRTUAL VISIT (OUTPATIENT)
Dept: FAMILY MEDICINE CLINIC | Age: 45
End: 2020-05-18
Payer: MEDICAID

## 2020-05-18 PROCEDURE — 99213 OFFICE O/P EST LOW 20 MIN: CPT | Performed by: NURSE PRACTITIONER

## 2020-05-18 RX ORDER — PREDNISONE 20 MG/1
40 TABLET ORAL DAILY
Qty: 20 TABLET | Refills: 0 | Status: SHIPPED | OUTPATIENT
Start: 2020-05-18 | End: 2020-05-28

## 2020-05-18 RX ORDER — TRAMADOL HYDROCHLORIDE 50 MG/1
50 TABLET ORAL EVERY 6 HOURS PRN
Qty: 28 TABLET | Refills: 0 | Status: SHIPPED | OUTPATIENT
Start: 2020-05-18 | End: 2020-05-25

## 2020-05-18 ASSESSMENT — ENCOUNTER SYMPTOMS
COLOR CHANGE: 0
CHEST TIGHTNESS: 0
VOMITING: 0
NAUSEA: 0
TROUBLE SWALLOWING: 0
COUGH: 0
EYE PAIN: 0
ABDOMINAL PAIN: 0
EYE REDNESS: 0
DIARRHEA: 0
BACK PAIN: 1
SHORTNESS OF BREATH: 0
SORE THROAT: 0

## 2020-05-18 NOTE — PROGRESS NOTES
SUBJECTIVE:    Patient ID: Marilu Suárez is a 39 y.o. female. Chief Complaint   Patient presents with    Back Pain       Back Pain   This is a recurrent problem. The current episode started 1 to 4 weeks ago. The problem occurs constantly. The problem is unchanged. The pain is present in the lumbar spine. The quality of the pain is described as aching, stabbing and shooting. The pain radiates to the right foot and right thigh. The pain is at a severity of 6/10. The pain is moderate. The symptoms are aggravated by bending, coughing, lying down, position, sitting, standing and stress. Stiffness is present all day. Associated symptoms include leg pain. Pertinent negatives include no abdominal pain, chest pain, dysuria, fever, headaches or numbness. Risk factors include sedentary lifestyle. She has tried analgesics, NSAIDs, muscle relaxant, walking and ice for the symptoms. The treatment provided mild relief.         Active Ambulatory Problems     Diagnosis Date Noted    Chronic low back pain 11/08/2012    Left leg pain 11/08/2012    Sciatica 11/08/2012    Nervous disorder 11/08/2012    Depression 11/08/2012    Migraine 11/08/2012    Seasonal allergies 11/08/2012    COPD (chronic obstructive pulmonary disease) (HCC) 11/08/2012    DDD (degenerative disc disease) 11/08/2012    Hypothyroid 11/08/2012    Obesity 11/08/2012    Right shoulder pain 11/08/2012    Hoarseness 08/08/2013    Gastroesophageal reflux disease 08/08/2013    Dysphagia 08/08/2013    Pelvic pain 08/08/2013    Urinary incontinence 08/08/2013    Rectal pain 05/01/2015    B12 deficiency 02/16/2016    Anal fissure 04/24/2017    Other fatigue 11/05/2018    Tarsal tunnel syndrome of left side 01/22/2019     Resolved Ambulatory Problems     Diagnosis Date Noted    No Resolved Ambulatory Problems     Past Medical History:   Diagnosis Date    Allergic rhinitis     Anemia     Cancer ([facility] [address]     Osteoarthritis      Allergies

## 2020-06-22 RX ORDER — FLUTICASONE PROPIONATE 50 MCG
SPRAY, SUSPENSION (ML) NASAL
Qty: 16 G | Refills: 5 | Status: SHIPPED | OUTPATIENT
Start: 2020-06-22 | End: 2020-12-15

## 2020-06-22 RX ORDER — CLONAZEPAM 0.5 MG/1
TABLET ORAL
Qty: 45 TABLET | Refills: 0 | Status: SHIPPED | OUTPATIENT
Start: 2020-06-22 | End: 2020-07-22

## 2020-06-22 RX ORDER — OXYBUTYNIN CHLORIDE 10 MG/1
TABLET, EXTENDED RELEASE ORAL
Qty: 30 TABLET | Refills: 0 | Status: SHIPPED | OUTPATIENT
Start: 2020-06-22 | End: 2020-07-22

## 2020-06-22 NOTE — TELEPHONE ENCOUNTER
Refill request received from pharmacy. Medication pending for approval.       Last Office Visit With PCP:  5/18/2020    Next Visit Date:  No future appointments.     Jessenia Randolph for review

## 2020-07-22 RX ORDER — CLONAZEPAM 0.5 MG/1
TABLET ORAL
Qty: 45 TABLET | Refills: 0 | Status: SHIPPED | OUTPATIENT
Start: 2020-07-22 | End: 2020-08-18

## 2020-07-22 RX ORDER — OXYBUTYNIN CHLORIDE 10 MG/1
TABLET, EXTENDED RELEASE ORAL
Qty: 30 TABLET | Refills: 5 | Status: SHIPPED | OUTPATIENT
Start: 2020-07-22 | End: 2021-01-18

## 2020-07-23 RX ORDER — OXYBUTYNIN CHLORIDE 10 MG/1
TABLET, EXTENDED RELEASE ORAL
Qty: 30 TABLET | Refills: 5 | OUTPATIENT
Start: 2020-07-23

## 2020-07-23 RX ORDER — CLONAZEPAM 0.5 MG/1
TABLET ORAL
Qty: 45 TABLET | Refills: 0 | OUTPATIENT
Start: 2020-07-23 | End: 2020-08-22

## 2020-07-23 RX ORDER — NYSTATIN 100000 [USP'U]/G
POWDER TOPICAL 3 TIMES DAILY PRN
Qty: 1 BOTTLE | Refills: 3 | Status: SHIPPED | OUTPATIENT
Start: 2020-07-23 | End: 2020-11-04 | Stop reason: SDUPTHER

## 2020-07-23 NOTE — TELEPHONE ENCOUNTER
Refill request received from pharmacy. Medication pending for approval.       Last Office Visit With PCP:  05/18/2020    Next Visit Date:  No future appointments.     VERA Up to date

## 2020-07-31 NOTE — TELEPHONE ENCOUNTER
Patient called, requested refill. Zanaflex    Last Office Visit Date:  7/23/2020    Next Office Visit Date:  No future appointments.     VERA marc

## 2020-08-02 RX ORDER — TIZANIDINE 4 MG/1
TABLET ORAL
Qty: 90 TABLET | Refills: 0 | Status: SHIPPED | OUTPATIENT
Start: 2020-08-02 | End: 2020-11-04

## 2020-08-18 RX ORDER — LEVOTHYROXINE SODIUM 0.15 MG/1
TABLET ORAL
Qty: 30 TABLET | Refills: 5 | Status: SHIPPED | OUTPATIENT
Start: 2020-08-18 | End: 2021-02-04 | Stop reason: SDUPTHER

## 2020-08-19 RX ORDER — PHENTERMINE HYDROCHLORIDE 37.5 MG/1
TABLET ORAL
Qty: 30 TABLET | Refills: 0 | Status: SHIPPED | OUTPATIENT
Start: 2020-08-19 | End: 2020-09-21

## 2020-08-19 RX ORDER — CLONAZEPAM 0.5 MG/1
TABLET ORAL
Qty: 45 TABLET | Refills: 0 | Status: SHIPPED | OUTPATIENT
Start: 2020-08-19 | End: 2020-09-21

## 2020-09-02 RX ORDER — ERGOCALCIFEROL 1.25 MG/1
CAPSULE ORAL
Qty: 4 CAPSULE | Refills: 2 | Status: SHIPPED | OUTPATIENT
Start: 2020-09-02 | End: 2020-11-04 | Stop reason: SDUPTHER

## 2020-09-21 RX ORDER — CLONAZEPAM 0.5 MG/1
TABLET ORAL
Qty: 45 TABLET | Refills: 0 | Status: SHIPPED | OUTPATIENT
Start: 2020-09-21 | End: 2020-10-19

## 2020-09-21 RX ORDER — PHENTERMINE HYDROCHLORIDE 37.5 MG/1
TABLET ORAL
Qty: 30 TABLET | Refills: 0 | Status: SHIPPED | OUTPATIENT
Start: 2020-09-21 | End: 2020-10-22

## 2020-10-19 NOTE — TELEPHONE ENCOUNTER
Refill request received from pharmacy    Last seen 05/18/2020    Next Office Visit Date:  No future appointments.     VERA Up to date

## 2020-10-22 RX ORDER — CLONAZEPAM 0.5 MG/1
TABLET ORAL
Qty: 45 TABLET | Refills: 0 | Status: SHIPPED | OUTPATIENT
Start: 2020-10-22 | End: 2020-11-04 | Stop reason: SDUPTHER

## 2020-10-22 RX ORDER — PHENTERMINE HYDROCHLORIDE 37.5 MG/1
TABLET ORAL
Qty: 30 TABLET | Refills: 0 | Status: SHIPPED | OUTPATIENT
Start: 2020-10-22 | End: 2020-11-04 | Stop reason: SDUPTHER

## 2020-11-03 NOTE — PROGRESS NOTES
Health Maintenance Due This Visit   Colonoscopy No   Mammogram No   Annual Wellness Visit No   Microalbumin No   HgbA1C No   Diabetic Eye Exam No    House Bill One Due This Visit   VERA Yes   UDS Yes   Contract Yes

## 2020-11-04 ENCOUNTER — OFFICE VISIT (OUTPATIENT)
Dept: FAMILY MEDICINE CLINIC | Age: 45
End: 2020-11-04
Payer: MEDICAID

## 2020-11-04 ENCOUNTER — HOSPITAL ENCOUNTER (OUTPATIENT)
Facility: HOSPITAL | Age: 45
Discharge: HOME | End: 2020-11-04
Payer: MEDICAID

## 2020-11-04 VITALS
OXYGEN SATURATION: 98 % | HEIGHT: 61 IN | SYSTOLIC BLOOD PRESSURE: 116 MMHG | RESPIRATION RATE: 18 BRPM | BODY MASS INDEX: 37.72 KG/M2 | DIASTOLIC BLOOD PRESSURE: 70 MMHG | HEART RATE: 96 BPM | WEIGHT: 199.8 LBS | TEMPERATURE: 97.5 F

## 2020-11-04 LAB
A/G RATIO: 2.1 (ref 0.8–2)
ALBUMIN SERPL-MCNC: 4.2 G/DL (ref 3.4–4.8)
ALP BLD-CCNC: 111 U/L (ref 25–100)
ALT SERPL-CCNC: 8 U/L (ref 4–36)
ANION GAP SERPL CALCULATED.3IONS-SCNC: 9 MMOL/L (ref 3–16)
AST SERPL-CCNC: 13 U/L (ref 8–33)
BILIRUB SERPL-MCNC: 0.3 MG/DL (ref 0.3–1.2)
BUN BLDV-MCNC: 8 MG/DL (ref 6–20)
CALCIUM SERPL-MCNC: 9.5 MG/DL (ref 8.5–10.5)
CHLORIDE BLD-SCNC: 102 MMOL/L (ref 98–107)
CO2: 28 MMOL/L (ref 20–30)
CREAT SERPL-MCNC: 0.7 MG/DL (ref 0.4–1.2)
GFR AFRICAN AMERICAN: >59
GFR NON-AFRICAN AMERICAN: >60
GLOBULIN: 2 G/DL
GLUCOSE BLD-MCNC: 90 MG/DL (ref 74–106)
HCT VFR BLD CALC: 35.5 % (ref 37–47)
HEMOGLOBIN: 10.4 G/DL (ref 11.5–16.5)
MCH RBC QN AUTO: 25.4 PG (ref 27–32)
MCHC RBC AUTO-ENTMCNC: 29.3 G/DL (ref 31–35)
MCV RBC AUTO: 86.8 FL (ref 80–100)
PDW BLD-RTO: 15.1 % (ref 11–16)
PLATELET # BLD: 285 K/UL (ref 150–400)
PMV BLD AUTO: 10.7 FL (ref 6–10)
POTASSIUM SERPL-SCNC: 4.1 MMOL/L (ref 3.4–5.1)
RBC # BLD: 4.09 M/UL (ref 3.8–5.8)
SODIUM BLD-SCNC: 139 MMOL/L (ref 136–145)
T4 FREE: 2.49 NG/DL (ref 0.89–1.76)
TOTAL PROTEIN: 6.2 G/DL (ref 6.4–8.3)
TSH SERPL DL<=0.05 MIU/L-ACNC: 0.17 UIU/ML (ref 0.35–5.5)
VITAMIN D 25-HYDROXY: 27.2 (ref 32–100)
WBC # BLD: 6.2 K/UL (ref 4–11)

## 2020-11-04 PROCEDURE — G8484 FLU IMMUNIZE NO ADMIN: HCPCS | Performed by: FAMILY MEDICINE

## 2020-11-04 PROCEDURE — 84439 ASSAY OF FREE THYROXINE: CPT

## 2020-11-04 PROCEDURE — 85027 COMPLETE CBC AUTOMATED: CPT

## 2020-11-04 PROCEDURE — 1036F TOBACCO NON-USER: CPT | Performed by: FAMILY MEDICINE

## 2020-11-04 PROCEDURE — 99214 OFFICE O/P EST MOD 30 MIN: CPT | Performed by: FAMILY MEDICINE

## 2020-11-04 PROCEDURE — 84443 ASSAY THYROID STIM HORMONE: CPT

## 2020-11-04 PROCEDURE — G8417 CALC BMI ABV UP PARAM F/U: HCPCS | Performed by: FAMILY MEDICINE

## 2020-11-04 PROCEDURE — G8427 DOCREV CUR MEDS BY ELIG CLIN: HCPCS | Performed by: FAMILY MEDICINE

## 2020-11-04 PROCEDURE — 80053 COMPREHEN METABOLIC PANEL: CPT

## 2020-11-04 PROCEDURE — 82306 VITAMIN D 25 HYDROXY: CPT

## 2020-11-04 RX ORDER — DEXLANSOPRAZOLE 60 MG/1
CAPSULE, DELAYED RELEASE ORAL
Qty: 90 CAPSULE | Refills: 3 | Status: SHIPPED | OUTPATIENT
Start: 2020-11-04 | End: 2021-05-04 | Stop reason: SDUPTHER

## 2020-11-04 RX ORDER — ERGOCALCIFEROL 1.25 MG/1
CAPSULE ORAL
Qty: 4 CAPSULE | Refills: 2 | Status: SHIPPED | OUTPATIENT
Start: 2020-11-04 | End: 2021-02-04 | Stop reason: SDUPTHER

## 2020-11-04 RX ORDER — NYSTATIN 100000 [USP'U]/G
POWDER TOPICAL 3 TIMES DAILY PRN
Qty: 1 BOTTLE | Refills: 3 | Status: SHIPPED | OUTPATIENT
Start: 2020-11-04 | End: 2022-04-21

## 2020-11-04 RX ORDER — CYANOCOBALAMIN 1000 UG/ML
1000 INJECTION INTRAMUSCULAR; SUBCUTANEOUS ONCE
Status: COMPLETED | OUTPATIENT
Start: 2020-11-04 | End: 2020-11-04

## 2020-11-04 RX ORDER — CLONAZEPAM 0.5 MG/1
TABLET ORAL
Qty: 45 TABLET | Refills: 2 | Status: SHIPPED | OUTPATIENT
Start: 2020-11-04 | End: 2021-02-04 | Stop reason: SDUPTHER

## 2020-11-04 RX ORDER — PHENTERMINE HYDROCHLORIDE 37.5 MG/1
TABLET ORAL
Qty: 30 TABLET | Refills: 2 | Status: SHIPPED | OUTPATIENT
Start: 2020-11-04 | End: 2020-12-04

## 2020-11-04 RX ADMIN — CYANOCOBALAMIN 1000 MCG: 1000 INJECTION INTRAMUSCULAR; SUBCUTANEOUS at 11:01

## 2020-11-04 ASSESSMENT — ENCOUNTER SYMPTOMS
COLOR CHANGE: 0
SORE THROAT: 0
COUGH: 0
DIARRHEA: 0
ABDOMINAL PAIN: 0
EYE PAIN: 0
EYE REDNESS: 0
TROUBLE SWALLOWING: 0
BACK PAIN: 1
NAUSEA: 0
VOMITING: 0
CHEST TIGHTNESS: 0
SHORTNESS OF BREATH: 0

## 2020-11-04 NOTE — PROGRESS NOTES
Administrations This Visit     cyanocobalamin injection 1,000 mcg     Admin Date  11/04/2020  11:01 Action  Given Dose  1000 mcg Route  Intramuscular Site  Deltoid Left Administered By  Ton Nava RN    Ordering Provider:  Suresh Garcia MD    [address]:  [id number]    Lot#:  [id number]    :  [address] [facility]    Patient Supplied?:  No              Patient tolerated injection well. Patient advised to wait 20 minutes in the office following the injection. No signs/symptoms of reaction noted after 20 minutes.

## 2020-11-04 NOTE — PROGRESS NOTES
Chief Complaint   Patient presents with    COPD    Hypothyroidism       Have you seen any other physician or provider since your last visit yes - [facility]     Have you had any other diagnostic tests since your last visit? no    Have you changed or stopped any medications since your last visit? no     I have recommended that this patient have a flu shot but she declines at this time. I have discussed the risks and benefits of this examination with her. The patient verbalizes understanding.

## 2020-11-04 NOTE — PROGRESS NOTES
SUBJECTIVE:    Patient ID: Clarisa Layne is a 39 y.o. female. Chief Complaint   Patient presents with    COPD    Hypothyroidism       HPI: office visit  She is having left knee pain. She is struggling with her walking. She says that she does feel like that is getting a little worse as she is getting older. She really does not know that she did anything particular to it. She does have some arthritic symptoms in general but she says the knee is the worst at the moment. She says the weather change always bothers her. She does continue to see pain management. She says that she is still having quite a bit of fatigue. She does have some shortness of breath with activity. She is frustrated with her weight. She says she is pretty active and she says she does try to watch somewhat what she eats but she says she just continues to gain weight. Her blood pressures have been doing okay at home. She denies any chest pain or shortness of breath. Review of Systems   Constitutional: Negative for activity change, appetite change, chills and fever. HENT: Negative for congestion, ear pain, nosebleeds, sore throat and trouble swallowing. Eyes: Negative for pain and redness. Respiratory: Negative for cough, chest tightness and shortness of breath. Cardiovascular: Negative for chest pain, palpitations and leg swelling. Gastrointestinal: Negative for abdominal pain, diarrhea, nausea and vomiting. Genitourinary: Negative for difficulty urinating, dysuria and flank pain. Musculoskeletal: Positive for arthralgias and back pain. Negative for gait problem. Skin: Negative for color change, pallor, rash and wound. Allergic/Immunologic: Negative for environmental allergies and food allergies. Neurological: Negative for dizziness, numbness and headaches. Hematological: Negative for adenopathy. Does not bruise/bleed easily. Psychiatric/Behavioral: Negative for agitation and sleep disturbance.  The patient is not nervous/anxious. OBJECTIVE:  /70   Pulse 96   Temp 97.5 °F (36.4 °C)   Resp 18   Ht 5' 1\" (1.549 m)   Wt 199 lb 12.8 oz (90.6 kg)   SpO2 98%   BMI 37.75 kg/m²    Wt Readings from Last 3 Encounters:   11/04/20 199 lb 12.8 oz (90.6 kg)   02/06/20 218 lb (98.9 kg)   10/23/19 202 lb (91.6 kg)     BP Readings from Last 3 Encounters:   11/04/20 116/70   02/06/20 128/76   10/23/19 132/72      Pulse Readings from Last 3 Encounters:   11/04/20 96   02/06/20 84   10/23/19 80     Body mass index is 37.75 kg/m². Resp Readings from Last 3 Encounters:   11/04/20 18   02/06/20 18   10/23/19 20     Past medical, surgical, family and social history were reviewed and updated with the patient. Physical Exam  Vitals signs and nursing note reviewed. Constitutional:       Appearance: Normal appearance. She is well-developed. HENT:      Head: Normocephalic and atraumatic. Right Ear: Hearing and external ear normal.      Left Ear: Hearing and external ear normal.      Nose: Nose normal.      Mouth/Throat:      Pharynx: Uvula midline. Eyes:      General: Lids are normal.      Conjunctiva/sclera: Conjunctivae normal.      Pupils: Pupils are equal, round, and reactive to light. Neck:      Musculoskeletal: Normal range of motion and neck supple. Thyroid: No thyroid mass or thyromegaly. Vascular: No carotid bruit. Trachea: Trachea and phonation normal.   Cardiovascular:      Rate and Rhythm: Normal rate and regular rhythm. Pulses: Normal pulses. Heart sounds: Normal heart sounds, S1 normal and S2 normal. No murmur. No friction rub. No gallop. Pulmonary:      Effort: Pulmonary effort is normal.      Breath sounds: Normal breath sounds. Genitourinary:     Exam position: Supine. Labia:         Right: No rash, tenderness, lesion or injury. Left: No rash, tenderness, lesion or injury. Vagina: No signs of injury and foreign body.  No vaginal MCV 86.8 11/04/2020     11/04/2020     Lab Results   Component Value Date    TSH 0.17 (L) 11/04/2020       ASSESSMENT:    Diagnosis Orders   1. Left knee pain, unspecified chronicity  XR KNEE LEFT (MIN 4 VIEWS)   2. Anxiety  clonazePAM (KLONOPIN) 0.5 MG tablet   3. Weight gain  phentermine (ADIPEX-P) 37.5 MG tablet   4. B12 deficiency     5. Vitamin D deficiency  VITAMIN D 25 HYDROXY   6. Depression, unspecified depression type     7. Elevated blood sugar  COMPREHENSIVE METABOLIC PANEL   8. Fatigue, unspecified type  TSH without Reflex    T4, FREE    CBC   9.  Hypothyroidism, unspecified type  TSH without Reflex    T4, FREE        PLAN:  Orders Placed This Encounter   Medications    clonazePAM (KLONOPIN) 0.5 MG tablet     Sig: Take 1/2 tablet every morning and 1 tablet evening     Dispense:  45 tablet     Refill:  2    phentermine (ADIPEX-P) 37.5 MG tablet     Sig: TAKE ONE TABLET EVERY MORNING BEFORE BREAKFAST     Dispense:  30 tablet     Refill:  2    vitamin D (ERGOCALCIFEROL) 1.25 MG (87025 UT) CAPS capsule     Sig: TAKE ONE CAPSULE WEEKLY     Dispense:  4 capsule     Refill:  2    nystatin (MYCOSTATIN) 655656 UNIT/GM powder     Sig: Apply topically 3 times daily as needed (yeast)     Dispense:  1 Bottle     Refill:  3    DEXILANT 60 MG CPDR delayed release capsule     Sig: TAKE ONE CAPSULE DAILY     Dispense:  90 capsule     Refill:  3    diclofenac sodium (VOLTAREN) 1 % GEL     Sig: Apply 4 g topically 4 times daily     Dispense:  4 Tube     Refill:  5    cyanocobalamin injection 1,000 mcg        Medications Discontinued During This Encounter   Medication Reason    SM EYE ITCH RELIEF 0.025 % ophthalmic solution LIST CLEANUP    tiZANidine (ZANAFLEX) 4 MG tablet LIST CLEANUP    clonazePAM (KLONOPIN) 0.5 MG tablet REORDER    phentermine (ADIPEX-P) 37.5 MG tablet REORDER    vitamin D (ERGOCALCIFEROL) 1.25 MG (01430 UT) CAPS capsule REORDER    nystatin (MYCOSTATIN) 332128 UNIT/GM powder REORDER    DEXILANT 60 MG CPDR delayed release capsule REORDER    diclofenac sodium (VOLTAREN) 1 % GEL REORDER    cyanocobalamin injection 1,000 mcg        Controlled Substances Monitoring: Attestation: The Prescription Monitoring Report for this patient was reviewed today. Dina Ngo MD)  Periodic Controlled Substance Monitoring: Possible medication side effects, risk of tolerance/dependence & alternative treatments discussed., No signs of potential drug abuse or diversion identified. , Assessed functional status., Obtaining appropriate analgesic effect of treatment. , Random urine drug screen sent today. Dina Ngo MD)    Please note: This chart was generated using Dragon dictation software. Although every effort was made to ensure the accuracy of this automated transcription, some errors in transcription may have occurred.

## 2020-12-15 RX ORDER — FLUTICASONE PROPIONATE 50 MCG
SPRAY, SUSPENSION (ML) NASAL
Qty: 16 G | Refills: 5 | Status: SHIPPED | OUTPATIENT
Start: 2020-12-15 | End: 2021-06-21

## 2020-12-15 NOTE — TELEPHONE ENCOUNTER
Refill request received from pharmacy.  Medication pending for approval.       Last Office Visit With PCP:  11/4/2020    Next Visit Date:  Future Appointments   Date Time Provider Petr Young   2/4/2021  9:45 AM Midge Dance, MD [address]

## 2021-01-18 RX ORDER — OXYBUTYNIN CHLORIDE 10 MG/1
TABLET, EXTENDED RELEASE ORAL
Qty: 30 TABLET | Refills: 5 | Status: SHIPPED | OUTPATIENT
Start: 2021-01-18 | End: 2021-02-04

## 2021-01-18 NOTE — TELEPHONE ENCOUNTER
Refill request received from pharmacy.  Medication pending for approval.       Last Office Visit With PCP:  11/4/2020    Next Visit Date:  Future Appointments   Date Time Provider Petr Young   2/4/2021  9:45 AM Lesia Barbosa MD [address] up to date

## 2021-02-04 ENCOUNTER — HOSPITAL ENCOUNTER (OUTPATIENT)
Facility: HOSPITAL | Age: 46
Discharge: HOME | End: 2021-02-04
Payer: MEDICAID

## 2021-02-04 ENCOUNTER — OFFICE VISIT (OUTPATIENT)
Dept: FAMILY MEDICINE CLINIC | Age: 46
End: 2021-02-04
Payer: MEDICAID

## 2021-02-04 VITALS
TEMPERATURE: 97.3 F | DIASTOLIC BLOOD PRESSURE: 70 MMHG | SYSTOLIC BLOOD PRESSURE: 138 MMHG | BODY MASS INDEX: 37.66 KG/M2 | HEART RATE: 71 BPM | RESPIRATION RATE: 18 BRPM | HEIGHT: 61 IN | WEIGHT: 199.5 LBS | OXYGEN SATURATION: 97 %

## 2021-02-04 DIAGNOSIS — R53.83 FATIGUE, UNSPECIFIED TYPE: ICD-10-CM

## 2021-02-04 DIAGNOSIS — E03.9 HYPOTHYROIDISM, UNSPECIFIED TYPE: ICD-10-CM

## 2021-02-04 DIAGNOSIS — F41.9 ANXIETY: ICD-10-CM

## 2021-02-04 DIAGNOSIS — Z13.220 SCREENING, LIPID: ICD-10-CM

## 2021-02-04 DIAGNOSIS — R92.8 ABNORMAL MAMMOGRAM OF LEFT BREAST: ICD-10-CM

## 2021-02-04 DIAGNOSIS — G89.29 CHRONIC PAIN OF LEFT KNEE: Primary | ICD-10-CM

## 2021-02-04 DIAGNOSIS — E53.8 B12 DEFICIENCY: ICD-10-CM

## 2021-02-04 DIAGNOSIS — E55.9 VITAMIN D DEFICIENCY: ICD-10-CM

## 2021-02-04 DIAGNOSIS — M25.562 CHRONIC PAIN OF LEFT KNEE: Primary | ICD-10-CM

## 2021-02-04 DIAGNOSIS — G89.29 CHRONIC PAIN OF LEFT ANKLE: ICD-10-CM

## 2021-02-04 DIAGNOSIS — M25.572 CHRONIC PAIN OF LEFT ANKLE: ICD-10-CM

## 2021-02-04 LAB
A/G RATIO: 1.7 (ref 0.8–2)
ALBUMIN SERPL-MCNC: 4 G/DL (ref 3.4–4.8)
ALP BLD-CCNC: 116 U/L (ref 25–100)
ALT SERPL-CCNC: <5 U/L (ref 4–36)
ANION GAP SERPL CALCULATED.3IONS-SCNC: 8 MMOL/L (ref 3–16)
AST SERPL-CCNC: 12 U/L (ref 8–33)
BILIRUB SERPL-MCNC: 0.3 MG/DL (ref 0.3–1.2)
BUN BLDV-MCNC: 9 MG/DL (ref 6–20)
CALCIUM SERPL-MCNC: 8.9 MG/DL (ref 8.5–10.5)
CHLORIDE BLD-SCNC: 103 MMOL/L (ref 98–107)
CHOLESTEROL, TOTAL: 150 MG/DL (ref 0–200)
CO2: 27 MMOL/L (ref 20–30)
CREAT SERPL-MCNC: 0.6 MG/DL (ref 0.4–1.2)
FOLATE: 7.32 NG/ML
GFR AFRICAN AMERICAN: >59
GFR NON-AFRICAN AMERICAN: >60
GLOBULIN: 2.3 G/DL
GLUCOSE BLD-MCNC: 86 MG/DL (ref 74–106)
HCT VFR BLD CALC: 32.1 % (ref 37–47)
HDLC SERPL-MCNC: 57 MG/DL (ref 40–60)
HEMOGLOBIN: 9.7 G/DL (ref 11.5–16.5)
LDL CHOLESTEROL CALCULATED: 71 MG/DL
MCH RBC QN AUTO: 25 PG (ref 27–32)
MCHC RBC AUTO-ENTMCNC: 30.2 G/DL (ref 31–35)
MCV RBC AUTO: 82.7 FL (ref 80–100)
PDW BLD-RTO: 15.7 % (ref 11–16)
PLATELET # BLD: 292 K/UL (ref 150–400)
PMV BLD AUTO: 10.3 FL (ref 6–10)
POTASSIUM SERPL-SCNC: 4.3 MMOL/L (ref 3.4–5.1)
RBC # BLD: 3.88 M/UL (ref 3.8–5.8)
SODIUM BLD-SCNC: 138 MMOL/L (ref 136–145)
T4 FREE: 1.95 NG/DL (ref 0.89–1.76)
TOTAL PROTEIN: 6.3 G/DL (ref 6.4–8.3)
TRIGL SERPL-MCNC: 111 MG/DL (ref 0–249)
TSH SERPL DL<=0.05 MIU/L-ACNC: 0.09 UIU/ML (ref 0.27–4.2)
VITAMIN B-12: 495 PG/ML (ref 211–911)
VITAMIN D 25-HYDROXY: 24.4 (ref 32–100)
VLDLC SERPL CALC-MCNC: 22 MG/DL
WBC # BLD: 8.1 K/UL (ref 4–11)

## 2021-02-04 PROCEDURE — 82746 ASSAY OF FOLIC ACID SERUM: CPT

## 2021-02-04 PROCEDURE — 84443 ASSAY THYROID STIM HORMONE: CPT

## 2021-02-04 PROCEDURE — 84439 ASSAY OF FREE THYROXINE: CPT

## 2021-02-04 PROCEDURE — G8484 FLU IMMUNIZE NO ADMIN: HCPCS | Performed by: FAMILY MEDICINE

## 2021-02-04 PROCEDURE — G8427 DOCREV CUR MEDS BY ELIG CLIN: HCPCS | Performed by: FAMILY MEDICINE

## 2021-02-04 PROCEDURE — G8417 CALC BMI ABV UP PARAM F/U: HCPCS | Performed by: FAMILY MEDICINE

## 2021-02-04 PROCEDURE — 85027 COMPLETE CBC AUTOMATED: CPT

## 2021-02-04 PROCEDURE — 80053 COMPREHEN METABOLIC PANEL: CPT

## 2021-02-04 PROCEDURE — 80061 LIPID PANEL: CPT

## 2021-02-04 PROCEDURE — 82607 VITAMIN B-12: CPT

## 2021-02-04 PROCEDURE — 99214 OFFICE O/P EST MOD 30 MIN: CPT | Performed by: FAMILY MEDICINE

## 2021-02-04 PROCEDURE — 82306 VITAMIN D 25 HYDROXY: CPT

## 2021-02-04 PROCEDURE — 1036F TOBACCO NON-USER: CPT | Performed by: FAMILY MEDICINE

## 2021-02-04 RX ORDER — CLONAZEPAM 0.5 MG/1
TABLET ORAL
Qty: 45 TABLET | Refills: 2 | Status: SHIPPED | OUTPATIENT
Start: 2021-02-04 | End: 2021-05-04 | Stop reason: SDUPTHER

## 2021-02-04 RX ORDER — MONTELUKAST SODIUM 10 MG/1
TABLET ORAL
Qty: 90 TABLET | Refills: 3 | Status: SHIPPED | OUTPATIENT
Start: 2021-02-04 | End: 2022-01-25 | Stop reason: SDUPTHER

## 2021-02-04 RX ORDER — FAMOTIDINE 40 MG/1
TABLET, FILM COATED ORAL
Qty: 180 TABLET | Refills: 3 | Status: SHIPPED | OUTPATIENT
Start: 2021-02-04 | End: 2022-01-25 | Stop reason: SDUPTHER

## 2021-02-04 RX ORDER — BUPROPION HYDROCHLORIDE 300 MG/1
TABLET ORAL
Qty: 90 TABLET | Refills: 3 | Status: SHIPPED | OUTPATIENT
Start: 2021-02-04 | End: 2022-01-25 | Stop reason: SDUPTHER

## 2021-02-04 RX ORDER — NAPROXEN 500 MG/1
500 TABLET ORAL 2 TIMES DAILY
Qty: 180 TABLET | Refills: 3 | Status: SHIPPED | OUTPATIENT
Start: 2021-02-04 | End: 2022-01-25 | Stop reason: SDUPTHER

## 2021-02-04 RX ORDER — EPINEPHRINE 0.3 MG/.3ML
INJECTION INTRAMUSCULAR
Qty: 1 EACH | Refills: 3 | Status: SHIPPED | OUTPATIENT
Start: 2021-02-04 | End: 2022-05-24 | Stop reason: SDUPTHER

## 2021-02-04 RX ORDER — FUROSEMIDE 20 MG/1
TABLET ORAL
Qty: 90 TABLET | Refills: 3 | Status: SHIPPED | OUTPATIENT
Start: 2021-02-04 | End: 2022-01-25 | Stop reason: SDUPTHER

## 2021-02-04 RX ORDER — ERGOCALCIFEROL 1.25 MG/1
CAPSULE ORAL
Qty: 4 CAPSULE | Refills: 2 | Status: SHIPPED | OUTPATIENT
Start: 2021-02-04 | End: 2021-05-04 | Stop reason: SDUPTHER

## 2021-02-04 RX ORDER — LEVOTHYROXINE SODIUM 0.15 MG/1
TABLET ORAL
Qty: 90 TABLET | Refills: 3 | Status: SHIPPED | OUTPATIENT
Start: 2021-02-04 | End: 2021-05-04 | Stop reason: SDUPTHER

## 2021-02-04 RX ORDER — LEVOCETIRIZINE DIHYDROCHLORIDE 5 MG/1
TABLET, FILM COATED ORAL
Qty: 90 TABLET | Refills: 3 | Status: SHIPPED | OUTPATIENT
Start: 2021-02-04 | End: 2022-01-25 | Stop reason: SDUPTHER

## 2021-02-04 RX ORDER — CYANOCOBALAMIN 1000 UG/ML
1000 INJECTION INTRAMUSCULAR; SUBCUTANEOUS ONCE
Status: COMPLETED | OUTPATIENT
Start: 2021-02-04 | End: 2021-02-04

## 2021-02-04 RX ORDER — TRIAMCINOLONE ACETONIDE 0.25 MG/G
CREAM TOPICAL
Qty: 1 TUBE | Refills: 2 | Status: SHIPPED | OUTPATIENT
Start: 2021-02-04 | End: 2021-05-04

## 2021-02-04 RX ADMIN — CYANOCOBALAMIN 1000 MCG: 1000 INJECTION INTRAMUSCULAR; SUBCUTANEOUS at 10:40

## 2021-02-04 ASSESSMENT — ENCOUNTER SYMPTOMS
CHEST TIGHTNESS: 0
DIARRHEA: 0
NAUSEA: 0
COLOR CHANGE: 0
EYE PAIN: 0
TROUBLE SWALLOWING: 0
SHORTNESS OF BREATH: 0
COUGH: 0
BACK PAIN: 1
VOMITING: 0
SORE THROAT: 0
ABDOMINAL PAIN: 0
EYE REDNESS: 0

## 2021-02-04 NOTE — PROGRESS NOTES
Chief Complaint   Patient presents with    COPD    Knee Pain     left       Have you seen any other physician or provider since your last visit yes - [address]    Have you had any other diagnostic tests since your last visit? no    Have you changed or stopped any medications since your last visit? no     I have recommended that this patient have a  but she declines at this time. I have discussed the risks and benefits of this examination with her. The patient verbalizes understanding. Administrations This Visit     cyanocobalamin injection 1,000 mcg     Admin Date  02/04/2021  10:40 Action  Given Dose  1,000 mcg Route  Intramuscular Site  Deltoid Left Administered By  iLss Espinoza MA    Ordering Provider: Maranda Canchola MD    NDC: 72052-641-27    : [address] [place] [address]    Patient Supplied?: No              Patient tolerated injection well. Patient advised to wait 20 minutes in the office following the injection. No signs/symptoms of reaction noted after 20 minutes.

## 2021-02-04 NOTE — PROGRESS NOTES
SUBJECTIVE:    Patient ID: Herminio Hernández is a 42 y.o. female. Chief Complaint   Patient presents with    COPD    Knee Pain     left       HPI: office visit  She is still haivng some increase in fatigue. She says they have had a lot of stress recently. She is not haivng chest pain but has had some sob with activity. She is feeling like she has some issues with her thyroid. She is not having any significant swelling. She is not having any medication problems. She is still having a lot of left knee pain and low back pain. Review of Systems   Constitutional: Negative for activity change, appetite change, chills and fever. HENT: Negative for congestion, ear pain, nosebleeds, sore throat and trouble swallowing. Eyes: Negative for pain and redness. Respiratory: Negative for cough, chest tightness and shortness of breath. Cardiovascular: Negative for chest pain, palpitations and leg swelling. Gastrointestinal: Negative for abdominal pain, diarrhea, nausea and vomiting. Genitourinary: Negative for difficulty urinating, dysuria and flank pain. Musculoskeletal: Positive for arthralgias and back pain. Negative for gait problem. Skin: Negative for color change, pallor, rash and wound. Allergic/Immunologic: Negative for environmental allergies and food allergies. Neurological: Negative for dizziness, numbness and headaches. Hematological: Negative for adenopathy. Does not bruise/bleed easily. Psychiatric/Behavioral: Negative for agitation and sleep disturbance. The patient is not nervous/anxious.          OBJECTIVE:  /70   Pulse 71   Temp 97.3 °F (36.3 °C)   Resp 18   Ht 5' 1\" (1.549 m)   Wt 199 lb 8 oz (90.5 kg)   SpO2 97% Comment: RA  BMI 37.70 kg/m²    Wt Readings from Last 3 Encounters:   02/04/21 199 lb 8 oz (90.5 kg)   11/04/20 199 lb 12.8 oz (90.6 kg)   02/06/20 218 lb (98.9 kg)     BP Readings from Last 3 Encounters:   02/04/21 138/70   11/04/20 116/70   02/06/20 128/76      Pulse Readings from Last 3 Encounters:   02/04/21 71   11/04/20 96   02/06/20 84     Body mass index is 37.7 kg/m². Resp Readings from Last 3 Encounters:   02/04/21 18   11/04/20 18   02/06/20 18     Past medical, surgical, family and social history were reviewed and updated with the patient. Physical Exam  Vitals signs and nursing note reviewed. Constitutional:       Appearance: Normal appearance. She is well-developed. HENT:      Head: Normocephalic and atraumatic. Right Ear: Hearing and external ear normal.      Left Ear: Hearing and external ear normal.      Nose: Nose normal.      Mouth/Throat:      Pharynx: Uvula midline. Eyes:      General: Lids are normal.      Conjunctiva/sclera: Conjunctivae normal.      Pupils: Pupils are equal, round, and reactive to light. Neck:      Musculoskeletal: Normal range of motion and neck supple. Thyroid: No thyroid mass or thyromegaly. Vascular: No carotid bruit. Trachea: Trachea and phonation normal.   Cardiovascular:      Rate and Rhythm: Normal rate and regular rhythm. Pulses: Normal pulses. Heart sounds: Normal heart sounds, S1 normal and S2 normal. No murmur. No friction rub. No gallop. Pulmonary:      Effort: Pulmonary effort is normal.      Breath sounds: Normal breath sounds. Genitourinary:     Exam position: Supine. Labia:         Right: No rash, tenderness, lesion or injury. Left: No rash, tenderness, lesion or injury. Vagina: No signs of injury and foreign body. No vaginal discharge, erythema, tenderness or bleeding. Rectum: Normal.      Comments: Status post hysterectomy    Musculoskeletal:      Right knee: She exhibits decreased range of motion. Tenderness found. Medial joint line tenderness noted. Lumbar back: She exhibits decreased range of motion, tenderness, pain and spasm. Comments: Right si joint tenderness    Skin:     General: Skin is warm and dry. 7. Fatigue, unspecified type  TSH without Reflex    T4, FREE    VITAMIN B12 & FOLATE    COMPREHENSIVE METABOLIC PANEL    CBC   8. Screening, lipid  LIPID PANEL   9. Abnormal mammogram of left breast  [facility]NOST[facility] W OR WO CAD BILATERAL        PLAN:  Orders Placed This Encounter   Medications    clonazePAM (KLONOPIN) 0.5 MG tablet     Sig: Take 1/2 tablet every morning and 1 tablet evening     Dispense:  45 tablet     Refill:  2    vitamin D (ERGOCALCIFEROL) 1.25 MG (12060 UT) CAPS capsule     Sig: TAKE ONE CAPSULE WEEKLY     Dispense:  4 capsule     Refill:  2    levothyroxine (SYNTHROID) 150 MCG tablet     Sig: TAKE ONE TABLET DAILY WITH WATER ON EMPTY STOMACH     Dispense:  90 tablet     Refill:  3    buPROPion (WELLBUTRIN XL) 300 MG extended release tablet     Sig: TAKE ONE TABLET EVERY MORNING     Dispense:  90 tablet     Refill:  3    furosemide (LASIX) 20 MG tablet     Sig: TAKE ONE TABLET BY MOUTH EVERY DAY     Dispense:  90 tablet     Refill:  3    famotidine (PEPCID) 40 MG tablet     Sig: Take one tablet daily     Dispense:  180 tablet     Refill:  3    levocetirizine (XYZAL) 5 MG tablet     Sig: TAKE ONE TABLET DAILY     Dispense:  90 tablet     Refill:  3    montelukast (SINGULAIR) 10 MG tablet     Sig: TAKE ONE TABLET BY MOUTH NIGHTLY     Dispense:  90 tablet     Refill:  3    naproxen (NAPROSYN) 500 MG tablet     Sig: Take 1 tablet by mouth 2 times daily     Dispense:  180 tablet     Refill:  3    EPIPEN 2-TALITA 0.3 MG/0.3ML SOAJ injection     Sig: Inject 1 pen as directed as needed     Dispense:  1 each     Refill:  3    triamcinolone (KENALOG) 0.025 % cream     Sig: Apply topically 2 times daily.      Dispense:  1 Tube     Refill:  2    cyanocobalamin injection 1,000 mcg        Medications Discontinued During This Encounter   Medication Reason    oxybutynin (DITROPAN-XL) 10 MG extended release tablet LIST CLEANUP    triamcinolone (KENALOG) 0.025 % cream REORDER    EPIPEN 2-TALITA 0.3 MG/0.3ML SOAJ injection REORDER    buPROPion (WELLBUTRIN XL) 300 MG extended release tablet REORDER    furosemide (LASIX) 20 MG tablet REORDER    famotidine (PEPCID) 40 MG tablet REORDER    levocetirizine (XYZAL) 5 MG tablet REORDER    montelukast (SINGULAIR) 10 MG tablet REORDER    naproxen (NAPROSYN) 500 MG tablet REORDER    levothyroxine (SYNTHROID) 150 MCG tablet REORDER    clonazePAM (KLONOPIN) 0.5 MG tablet REORDER    vitamin D (ERGOCALCIFEROL) 1.25 MG (30224 UT) CAPS capsule REORDER       Controlled Substances Monitoring: Attestation: The Prescription Monitoring Report for this patient was reviewed today. Shavon Beasley MD)  Periodic Controlled Substance Monitoring: Possible medication side effects, risk of tolerance/dependence & alternative treatments discussed., No signs of potential drug abuse or diversion identified. , Assessed functional status. Shavon Beasley MD)    Please note: This chart was generated using Dragon dictation software. Although every effort was made to ensure the accuracy of this automated transcription, some errors in transcription may have occurred.

## 2021-02-19 DIAGNOSIS — R63.5 WEIGHT GAIN: Primary | ICD-10-CM

## 2021-02-21 RX ORDER — PHENTERMINE HYDROCHLORIDE 37.5 MG/1
TABLET ORAL
Qty: 30 TABLET | Refills: 2 | Status: SHIPPED | OUTPATIENT
Start: 2021-02-21 | End: 2021-05-04 | Stop reason: SDUPTHER

## 2021-03-16 ENCOUNTER — HOSPITAL ENCOUNTER (OUTPATIENT)
Dept: MAMMOGRAPHY | Facility: HOSPITAL | Age: 46
Discharge: HOME | End: 2021-03-16
Payer: MEDICAID

## 2021-03-16 ENCOUNTER — HOSPITAL ENCOUNTER (OUTPATIENT)
Dept: ULTRASOUND IMAGING | Facility: HOSPITAL | Age: 46
Discharge: HOME | End: 2021-03-16
Payer: MEDICAID

## 2021-03-16 ENCOUNTER — HOSPITAL ENCOUNTER (OUTPATIENT)
Dept: GENERAL RADIOLOGY | Facility: HOSPITAL | Age: 46
Discharge: HOME | End: 2021-03-16
Payer: MEDICAID

## 2021-03-16 ENCOUNTER — HOSPITAL ENCOUNTER (OUTPATIENT)
Facility: HOSPITAL | Age: 46
Discharge: HOME | End: 2021-03-16
Payer: MEDICAID

## 2021-03-16 DIAGNOSIS — N63.0 PALPABLE MASS OF BREAST: ICD-10-CM

## 2021-03-16 DIAGNOSIS — M25.562 CHRONIC PAIN OF LEFT KNEE: ICD-10-CM

## 2021-03-16 DIAGNOSIS — R92.8 ABNORMAL MAMMOGRAM OF LEFT BREAST: ICD-10-CM

## 2021-03-16 DIAGNOSIS — G89.29 CHRONIC PAIN OF LEFT KNEE: ICD-10-CM

## 2021-03-16 PROCEDURE — 76642 ULTRASOUND BREAST LIMITED: CPT

## 2021-03-16 PROCEDURE — G0279 TOMOSYNTHESIS, MAMMO: HCPCS

## 2021-03-16 PROCEDURE — 73564 X-RAY EXAM KNEE 4 OR MORE: CPT

## 2021-03-23 DIAGNOSIS — G89.29 CHRONIC PAIN OF LEFT KNEE: Primary | ICD-10-CM

## 2021-03-23 DIAGNOSIS — M25.562 CHRONIC PAIN OF LEFT KNEE: Primary | ICD-10-CM

## 2021-04-01 ENCOUNTER — TELEPHONE (OUTPATIENT)
Dept: FAMILY MEDICINE CLINIC | Age: 46
End: 2021-04-01

## 2021-04-01 NOTE — TELEPHONE ENCOUNTER
Patient's referral, along with all pertinent medical records faxed to the attention of Ashanti Plata (Dr. Benny Flores) on 04/01/21, they will contact the patient and our office with appointment date/time/location.

## 2021-05-04 ENCOUNTER — OFFICE VISIT (OUTPATIENT)
Dept: FAMILY MEDICINE CLINIC | Age: 46
End: 2021-05-04
Payer: MEDICAID

## 2021-05-04 ENCOUNTER — HOSPITAL ENCOUNTER (OUTPATIENT)
Facility: HOSPITAL | Age: 46
Discharge: HOME | End: 2021-05-04
Payer: MEDICAID

## 2021-05-04 VITALS
TEMPERATURE: 97.2 F | HEIGHT: 61 IN | BODY MASS INDEX: 35.89 KG/M2 | OXYGEN SATURATION: 98 % | WEIGHT: 190.1 LBS | SYSTOLIC BLOOD PRESSURE: 110 MMHG | DIASTOLIC BLOOD PRESSURE: 72 MMHG | HEART RATE: 90 BPM | RESPIRATION RATE: 18 BRPM

## 2021-05-04 DIAGNOSIS — R53.83 FATIGUE, UNSPECIFIED TYPE: ICD-10-CM

## 2021-05-04 DIAGNOSIS — F32.A DEPRESSION, UNSPECIFIED DEPRESSION TYPE: ICD-10-CM

## 2021-05-04 DIAGNOSIS — R63.5 WEIGHT GAIN: ICD-10-CM

## 2021-05-04 DIAGNOSIS — E53.8 B12 DEFICIENCY: ICD-10-CM

## 2021-05-04 DIAGNOSIS — M25.562 LEFT KNEE PAIN, UNSPECIFIED CHRONICITY: Primary | ICD-10-CM

## 2021-05-04 DIAGNOSIS — E55.9 VITAMIN D DEFICIENCY: ICD-10-CM

## 2021-05-04 DIAGNOSIS — J30.2 SEASONAL ALLERGIES: ICD-10-CM

## 2021-05-04 DIAGNOSIS — J43.8 OTHER EMPHYSEMA (HCC): ICD-10-CM

## 2021-05-04 DIAGNOSIS — E03.9 HYPOTHYROIDISM, UNSPECIFIED TYPE: ICD-10-CM

## 2021-05-04 DIAGNOSIS — F41.9 ANXIETY: ICD-10-CM

## 2021-05-04 LAB
A/G RATIO: 1.7 (ref 0.8–2)
ALBUMIN SERPL-MCNC: 4 G/DL (ref 3.4–4.8)
ALP BLD-CCNC: 115 U/L (ref 25–100)
ALT SERPL-CCNC: 6 U/L (ref 4–36)
ANION GAP SERPL CALCULATED.3IONS-SCNC: 8 MMOL/L (ref 3–16)
AST SERPL-CCNC: 12 U/L (ref 8–33)
BILIRUB SERPL-MCNC: <0.2 MG/DL (ref 0.3–1.2)
BUN BLDV-MCNC: 11 MG/DL (ref 6–20)
CALCIUM SERPL-MCNC: 9.4 MG/DL (ref 8.5–10.5)
CHLORIDE BLD-SCNC: 104 MMOL/L (ref 98–107)
CO2: 28 MMOL/L (ref 20–30)
CREAT SERPL-MCNC: 0.7 MG/DL (ref 0.4–1.2)
GFR AFRICAN AMERICAN: >59
GFR NON-AFRICAN AMERICAN: >60
GLOBULIN: 2.4 G/DL
GLUCOSE BLD-MCNC: 99 MG/DL (ref 74–106)
HCT VFR BLD CALC: 33.6 % (ref 37–47)
HEMOGLOBIN: 9.8 G/DL (ref 11.5–16.5)
MCH RBC QN AUTO: 24.5 PG (ref 27–32)
MCHC RBC AUTO-ENTMCNC: 29.2 G/DL (ref 31–35)
MCV RBC AUTO: 84 FL (ref 80–100)
PDW BLD-RTO: 15.9 % (ref 11–16)
PLATELET # BLD: 300 K/UL (ref 150–400)
PMV BLD AUTO: 10.8 FL (ref 6–10)
POTASSIUM SERPL-SCNC: 4.1 MMOL/L (ref 3.4–5.1)
RBC # BLD: 4 M/UL (ref 3.8–5.8)
SODIUM BLD-SCNC: 140 MMOL/L (ref 136–145)
T4 FREE: 2.31 NG/DL (ref 0.89–1.76)
TOTAL PROTEIN: 6.4 G/DL (ref 6.4–8.3)
TSH SERPL DL<=0.05 MIU/L-ACNC: 0.03 UIU/ML (ref 0.27–4.2)
VITAMIN D 25-HYDROXY: 24.2 (ref 32–100)
WBC # BLD: 6.9 K/UL (ref 4–11)

## 2021-05-04 PROCEDURE — 82306 VITAMIN D 25 HYDROXY: CPT

## 2021-05-04 PROCEDURE — G8926 SPIRO NO PERF OR DOC: HCPCS | Performed by: FAMILY MEDICINE

## 2021-05-04 PROCEDURE — 85027 COMPLETE CBC AUTOMATED: CPT

## 2021-05-04 PROCEDURE — 3023F SPIROM DOC REV: CPT | Performed by: FAMILY MEDICINE

## 2021-05-04 PROCEDURE — 84443 ASSAY THYROID STIM HORMONE: CPT

## 2021-05-04 PROCEDURE — G8427 DOCREV CUR MEDS BY ELIG CLIN: HCPCS | Performed by: FAMILY MEDICINE

## 2021-05-04 PROCEDURE — 80053 COMPREHEN METABOLIC PANEL: CPT

## 2021-05-04 PROCEDURE — 99214 OFFICE O/P EST MOD 30 MIN: CPT | Performed by: FAMILY MEDICINE

## 2021-05-04 PROCEDURE — 1036F TOBACCO NON-USER: CPT | Performed by: FAMILY MEDICINE

## 2021-05-04 PROCEDURE — G8417 CALC BMI ABV UP PARAM F/U: HCPCS | Performed by: FAMILY MEDICINE

## 2021-05-04 PROCEDURE — 84439 ASSAY OF FREE THYROXINE: CPT

## 2021-05-04 RX ORDER — CYANOCOBALAMIN 1000 UG/ML
1000 INJECTION INTRAMUSCULAR; SUBCUTANEOUS ONCE
Status: COMPLETED | OUTPATIENT
Start: 2021-05-04 | End: 2021-05-04

## 2021-05-04 RX ORDER — CLONAZEPAM 0.5 MG/1
TABLET ORAL
Qty: 45 TABLET | Refills: 2 | Status: SHIPPED | OUTPATIENT
Start: 2021-05-04 | End: 2021-08-16

## 2021-05-04 RX ORDER — DEXLANSOPRAZOLE 60 MG/1
CAPSULE, DELAYED RELEASE ORAL
Qty: 90 CAPSULE | Refills: 3 | Status: SHIPPED | OUTPATIENT
Start: 2021-05-04 | End: 2022-01-25 | Stop reason: SDUPTHER

## 2021-05-04 RX ORDER — PHENTERMINE HYDROCHLORIDE 37.5 MG/1
TABLET ORAL
Qty: 30 TABLET | Refills: 2 | Status: SHIPPED | OUTPATIENT
Start: 2021-05-04 | End: 2021-08-16

## 2021-05-04 RX ORDER — LEVOTHYROXINE SODIUM 0.15 MG/1
TABLET ORAL
Qty: 90 TABLET | Refills: 3 | Status: SHIPPED | OUTPATIENT
Start: 2021-05-04 | End: 2021-10-27

## 2021-05-04 RX ORDER — ERGOCALCIFEROL 1.25 MG/1
CAPSULE ORAL
Qty: 4 CAPSULE | Refills: 2 | Status: SHIPPED | OUTPATIENT
Start: 2021-05-04 | End: 2021-09-17

## 2021-05-04 RX ADMIN — CYANOCOBALAMIN 1000 MCG: 1000 INJECTION INTRAMUSCULAR; SUBCUTANEOUS at 11:18

## 2021-05-04 ASSESSMENT — ENCOUNTER SYMPTOMS
COUGH: 0
VOMITING: 0
DIARRHEA: 0
EYE REDNESS: 0
NAUSEA: 0
EYE PAIN: 0
SORE THROAT: 0
BACK PAIN: 1
CHEST TIGHTNESS: 0
SHORTNESS OF BREATH: 0
TROUBLE SWALLOWING: 0
COLOR CHANGE: 0
ABDOMINAL PAIN: 0

## 2021-05-04 NOTE — PROGRESS NOTES
SUBJECTIVE:    Patient ID: Renny Forbes is a 42 y.o. female. Chief Complaint   Patient presents with    Knee Pain    COPD       HPI: office visit  She is in the office today in follow-up of her knee pain. She still see orthopedics. She is still having a lot of trouble. She says she has been trying really hard to lose some weight and she has done well. She has been walking more. She says she been getting outside quite a bit. She says her depression seems to be relatively stable. She is here to follow-up also on her thyroid. She does continue plan of fatigue. She is helping to try to improve her those symptoms. Review of Systems   Constitutional: Negative for activity change, appetite change, chills and fever. HENT: Negative for congestion, ear pain, nosebleeds, sore throat and trouble swallowing. Eyes: Negative for pain and redness. Respiratory: Negative for cough, chest tightness and shortness of breath. Cardiovascular: Negative for chest pain, palpitations and leg swelling. Gastrointestinal: Negative for abdominal pain, diarrhea, nausea and vomiting. Genitourinary: Negative for difficulty urinating, dysuria and flank pain. Musculoskeletal: Positive for arthralgias and back pain. Negative for gait problem. Skin: Negative for color change, pallor, rash and wound. Allergic/Immunologic: Negative for environmental allergies and food allergies. Neurological: Negative for dizziness, numbness and headaches. Hematological: Negative for adenopathy. Does not bruise/bleed easily. Psychiatric/Behavioral: Negative for agitation and sleep disturbance. The patient is not nervous/anxious.          OBJECTIVE:  /72   Pulse 90   Temp 97.2 °F (36.2 °C)   Resp 18   Ht 5' 1\" (1.549 m)   Wt 190 lb 1.6 oz (86.2 kg)   SpO2 98% Comment: ra  BMI 35.92 kg/m²    Wt Readings from Last 3 Encounters:   05/04/21 190 lb 1.6 oz (86.2 kg)   02/04/21 199 lb 8 oz (90.5 kg)   11/04/20 199 lb 12.8 oz Comments: Right si joint tenderness    Skin:     General: Skin is warm and dry. Neurological:      Mental Status: She is alert and oriented to person, place, and time. Results in Past 30 Days  Result Component Current Result Ref Range Previous Result Ref Range   Albumin/Globulin Ratio 1.7 (5/4/2021) 0.8 - 2.0 Not in Time Range    Albumin 4.0 (5/4/2021) 3.4 - 4.8 g/dL Not in Time Range    Alkaline Phosphatase 115 (H) (5/4/2021) 25 - 100 U/L Not in Time Range    ALT 6 (5/4/2021) 4 - 36 U/L Not in Time Range    AST 12 (5/4/2021) 8 - 33 U/L Not in Time Range    BUN 11 (5/4/2021) 6 - 20 mg/dL Not in Time Range    Calcium 9.4 (5/4/2021) 8.5 - 10.5 mg/dL Not in Time Range    Chloride 104 (5/4/2021) 98 - 107 mmol/L Not in Time Range    CO2 28 (5/4/2021) 20 - 30 mmol/L Not in Time Range    CREATININE 0.7 (5/4/2021) 0.4 - 1.2 mg/dL Not in Time Range    GFR  >59 (5/4/2021) >59 Not in Time Range    GFR Non- >60 (5/4/2021) >59 Not in Time Range    Globulin 2.4 (5/4/2021) g/dL Not in Time Range    Glucose 99 (5/4/2021) 74 - 106 mg/dL Not in Time Range    Potassium 4.1 (5/4/2021) 3.4 - 5.1 mmol/L Not in Time Range    Sodium 140 (5/4/2021) 136 - 145 mmol/L Not in Time Range    Total Bilirubin <0.2 (L) (5/4/2021) 0.3 - 1.2 mg/dL Not in Time Range    Total Protein 6.4 (5/4/2021) 6.4 - 8.3 g/dL Not in Time Range      Hemoglobin A1C (%)   Date Value   02/06/2020 5.4     LDL Calculated (mg/dL)   Date Value   02/04/2021 71       Lab Results   Component Value Date    WBC 6.9 05/04/2021    NEUTROABS 5.0 04/24/2017    HGB 9.8 05/04/2021    HCT 33.6 05/04/2021    MCV 84.0 05/04/2021     05/04/2021     Lab Results   Component Value Date    TSH 0.03 (L) 05/04/2021       ASSESSMENT:    Diagnosis Orders   1. Left knee pain, unspecified chronicity  External Referral To Orthopedic Surgery   2. Anxiety  clonazePAM (KLONOPIN) 0.5 MG tablet   3.  Weight gain  phentermine (ADIPEX-P) 37.5 MG tablet 4. Depression, unspecified depression type     5. B12 deficiency     6. Hypothyroidism, unspecified type  TSH without Reflex    T4, FREE   7. Seasonal allergies     8. Other emphysema ([address]     9. Vitamin D deficiency  VITAMIN D 25 HYDROXY   10. Fatigue, unspecified type  CBC    COMPREHENSIVE METABOLIC PANEL        PLAN:  Orders Placed This Encounter   Medications    DEXILANT 60 MG CPDR delayed release capsule     Sig: TAKE ONE CAPSULE DAILY     Dispense:  90 capsule     Refill:  3    clonazePAM (KLONOPIN) 0.5 MG tablet     Sig: Take 1/2 tablet every morning and 1 tablet evening     Dispense:  45 tablet     Refill:  2    vitamin D (ERGOCALCIFEROL) 1.25 MG (87333 UT) CAPS capsule     Sig: TAKE ONE CAPSULE WEEKLY     Dispense:  4 capsule     Refill:  2    levothyroxine (SYNTHROID) 150 MCG tablet     Sig: TAKE ONE TABLET DAILY WITH WATER ON EMPTY STOMACH     Dispense:  90 tablet     Refill:  3    phentermine (ADIPEX-P) 37.5 MG tablet     Sig: TAKE ONE TABLET EVERY MORNING BEFORE BREAKFAST     Dispense:  30 tablet     Refill:  2    cyanocobalamin injection 1,000 mcg        Medications Discontinued During This Encounter   Medication Reason    triamcinolone (KENALOG) 0.025 % cream LIST CLEANUP    DEXILANT 60 MG CPDR delayed release capsule REORDER    clonazePAM (KLONOPIN) 0.5 MG tablet REORDER    vitamin D (ERGOCALCIFEROL) 1.25 MG (48200 UT) CAPS capsule REORDER    levothyroxine (SYNTHROID) 150 MCG tablet REORDER    phentermine (ADIPEX-P) 37.5 MG tablet REORDER       Controlled Substances Monitoring:      Please note: This chart was generated using Dragon dictation software. Although every effort was made to ensure the accuracy of this automated transcription, some errors in transcription may have occurred.

## 2021-05-04 NOTE — PATIENT INSTRUCTIONS
The medication list included in this document is our record of what you are currently taking, including any changes that were made at today's visit.  If you find any differences when compared to your medications at home, or have any questions that were not answered at your visit, please contact the office. We are committed to providing you with the best care possible. In order to help us achieve these goals please remember to bring all medications, herbal products, and over the counter supplements with you to each visit. If your provider has ordered testing for you, please be sure to follow up with our office if you have not received results within 7 days after the testing took place. *If you receive a survey after visiting one of our offices, please take time to share your experience concerning your physician office visit. These surveys are confidential and no health information about you is shared. We are eager to improve for you and we are counting on your feedback to help make that happen. · Keep a list of your medicines with you. List all of the prescription medicines, nonprescription medicines, supplements, natural remedies, and vitamins that you take. Tell your healthcare providers who treat you about all of the products you are taking. Your provider can provide you with a form to keep track of them. Just ask. · Follow the directions that come with your medicine, including information about food or alcohol. Make sure you know how and when to take your medicine. Do not take more or less than you are supposed to take. · Keep all medicines out of the reach of children. · Store medicines according to the directions on the label. · Monitor yourself. Learn to know how your body reacts to your new medicine and keep track of how it makes you feel before attempting (If your provider has allowed you to do so) to drive or go to work.    · Seek emergency medical attention if you think you have used too

## 2021-05-04 NOTE — PROGRESS NOTES
Administrations This Visit     cyanocobalamin injection 1,000 mcg     Admin Date  05/04/2021  11:18 Action  Given Dose  1,000 mcg Route  Intramuscular Site  Deltoid Left Administered By  Judd Grady MA    Ordering Provider: Aaron Parikh MD    NDC: 35757-445-39    : [address] [place] [address]    Patient Supplied?: No                Patient tolerated injection well. Patient advised to wait 20 minutes in the office following the injection. No signs/symptoms of reaction noted after 20 minutes.

## 2021-05-05 ENCOUNTER — TELEPHONE (OUTPATIENT)
Dept: FAMILY MEDICINE CLINIC | Age: 46
End: 2021-05-05

## 2021-05-05 NOTE — TELEPHONE ENCOUNTER
Patient's referral, along with all pertinent medical records faxed to the attention of Dr. Basia Han (Dr. Basia Hna) on 05/05/21, they will contact the patient and our office with appointment date/time/location.

## 2021-05-06 NOTE — TELEPHONE ENCOUNTER
Patient scheduled to see Dr. Susie Morrissey (Ortho) on 05/25/21 at 10:30     Patient's referral, along with all pertinent medical records faxed to the attention of scheduling on 05/05/21. Patient contacted advised of appointment date/time/location. Patient verbalized understanding of all instructions.  (Per Ortho)

## 2021-06-08 ENCOUNTER — PATIENT MESSAGE (OUTPATIENT)
Dept: FAMILY MEDICINE CLINIC | Age: 46
End: 2021-06-08

## 2021-06-08 NOTE — TELEPHONE ENCOUNTER
From: Shavon Quintanilla  To: Kingston Thompson MD  Sent: 6/8/2021 12:34 PM EDT  Subject: Non-Urgent Medical Question    Aguila Cruz was wanting to know if you could be his oldest sons doctor he is 40years old he is in [facility] he just found out he has colon cancer they are doing surgery on him right now he does not have a family doctor and Aguila Cruz was wanting you to be his doctor due to you being familiar with it running in his family. Please let us know something.

## 2021-06-21 RX ORDER — FLUTICASONE PROPIONATE 50 MCG
SPRAY, SUSPENSION (ML) NASAL
Qty: 16 G | Refills: 5 | Status: SHIPPED | OUTPATIENT
Start: 2021-06-21 | End: 2021-12-20

## 2021-06-21 NOTE — TELEPHONE ENCOUNTER
Refill request received from pharmacy.  Medication pending for approval.       Last Office Visit With PCP:  5/4/2021    Next Visit Date:  Future Appointments   Date Time Provider Petr Young   8/4/2021 11:15 AM Nicola Hardy MD [address]

## 2021-07-19 NOTE — TELEPHONE ENCOUNTER
Refill request received from pharmacy.  Medication pending for approval.       Last Office Visit With PCP:  5/4/2021    Next Visit Date:  Future Appointments   Date Time Provider Petr Young   8/4/2021 11:15 [facility]

## 2021-07-20 RX ORDER — OXYBUTYNIN CHLORIDE 10 MG/1
TABLET, EXTENDED RELEASE ORAL
Qty: 30 TABLET | Refills: 0 | Status: SHIPPED | OUTPATIENT
Start: 2021-07-20 | End: 2022-01-25

## 2021-07-20 RX ORDER — OXYBUTYNIN CHLORIDE 10 MG/1
TABLET, EXTENDED RELEASE ORAL
Qty: 30 TABLET | Refills: 0 | OUTPATIENT
Start: 2021-07-20

## 2021-07-20 NOTE — TELEPHONE ENCOUNTER
Refill request received from pharmacy.  Medication pending for approval.       Last Office Visit With PCP:  5/4/2021    Next Visit Date:  Future Appointments   Date Time Provider Petr Young   8/4/2021 11:15 AM Liam Garcia MD [facility] 81

## 2021-07-29 RX ORDER — OXYBUTYNIN CHLORIDE 10 MG/1
TABLET, EXTENDED RELEASE ORAL
Qty: 30 TABLET | Refills: 0 | OUTPATIENT
Start: 2021-07-29

## 2021-07-29 NOTE — TELEPHONE ENCOUNTER
Refill request received from pharmacy.  Medication pending for approval.       Last Office Visit With PCP:  5/4/2021    Next Visit Date:  Future Appointments   Date Time Provider Petr Young   8/4/2021 11:15 AM Suresh Garcia MD [address] [place] [address]

## 2021-08-02 ENCOUNTER — TELEPHONE (OUTPATIENT)
Dept: FAMILY MEDICINE CLINIC | Age: 46
End: 2021-08-02

## 2021-08-02 NOTE — TELEPHONE ENCOUNTER
Daljit Hurt called requesting to move appt around 1pm. Visit scheduled for 8/4 @ 11:15am. Requesting back to back appt for herself and MRN [id number]. If not able to change time would like appt in the next week or so. [facility] not able to accommodate. Best time to reach her is anytime @ [phone]. Thank you.

## 2021-08-16 DIAGNOSIS — F41.9 ANXIETY: ICD-10-CM

## 2021-08-16 DIAGNOSIS — R63.5 WEIGHT GAIN: ICD-10-CM

## 2021-08-16 RX ORDER — CLONAZEPAM 0.5 MG/1
TABLET ORAL
Qty: 45 TABLET | Refills: 0 | Status: SHIPPED | OUTPATIENT
Start: 2021-08-16 | End: 2021-09-17

## 2021-08-16 RX ORDER — PHENTERMINE HYDROCHLORIDE 37.5 MG/1
TABLET ORAL
Qty: 30 TABLET | Refills: 0 | Status: SHIPPED | OUTPATIENT
Start: 2021-08-16 | End: 2021-09-30 | Stop reason: SDUPTHER

## 2021-08-16 NOTE — TELEPHONE ENCOUNTER
Refill request received from pharmacy.  Medication pending for approval.       Last Office Visit With PCP:  5/4/2021    Next Visit Date:  Future Appointments   Date Time Provider Petr Young   9/14/2021  3:30 PM Lena Spann MD [address]

## 2021-09-17 DIAGNOSIS — F41.9 ANXIETY: ICD-10-CM

## 2021-09-17 RX ORDER — CLONAZEPAM 0.5 MG/1
TABLET ORAL
Qty: 45 TABLET | Refills: 0 | Status: SHIPPED | OUTPATIENT
Start: 2021-09-17 | End: 2021-09-30 | Stop reason: SDUPTHER

## 2021-09-17 RX ORDER — ERGOCALCIFEROL 1.25 MG/1
CAPSULE ORAL
Qty: 4 CAPSULE | Refills: 0 | Status: SHIPPED | OUTPATIENT
Start: 2021-09-17 | End: 2021-09-30 | Stop reason: SDUPTHER

## 2021-09-17 NOTE — TELEPHONE ENCOUNTER
Patient called, requested refill. Next Office Visit Date:  No future appointments.     VERA please review via [address]

## 2021-09-20 RX ORDER — PHENTERMINE HYDROCHLORIDE 37.5 MG/1
TABLET ORAL
Qty: 30 TABLET | Refills: 0 | OUTPATIENT
Start: 2021-09-20 | End: 2021-10-20

## 2021-09-30 DIAGNOSIS — R63.5 WEIGHT GAIN: ICD-10-CM

## 2021-09-30 DIAGNOSIS — F41.9 ANXIETY: ICD-10-CM

## 2021-09-30 RX ORDER — ERGOCALCIFEROL 1.25 MG/1
CAPSULE ORAL
Qty: 4 CAPSULE | Refills: 0 | Status: SHIPPED | OUTPATIENT
Start: 2021-09-30 | End: 2021-11-29

## 2021-09-30 NOTE — TELEPHONE ENCOUNTER
Patient called, requested refill.        Next Office Visit Date:  Future Appointments   Date Time Provider Petr Young   10/26/2021  3:45 PM Maddie Chiu MD [address] please review via [address]

## 2021-10-01 RX ORDER — CLONAZEPAM 0.5 MG/1
TABLET ORAL
Qty: 45 TABLET | Refills: 2 | Status: SHIPPED | OUTPATIENT
Start: 2021-10-01 | End: 2022-01-18

## 2021-10-01 RX ORDER — PHENTERMINE HYDROCHLORIDE 37.5 MG/1
TABLET ORAL
Qty: 30 TABLET | Refills: 2 | Status: SHIPPED | OUTPATIENT
Start: 2021-10-01 | End: 2021-12-28

## 2021-10-26 ENCOUNTER — OFFICE VISIT (OUTPATIENT)
Dept: FAMILY MEDICINE CLINIC | Age: 46
End: 2021-10-26
Payer: MEDICAID

## 2021-10-26 ENCOUNTER — HOSPITAL ENCOUNTER (OUTPATIENT)
Facility: HOSPITAL | Age: 46
Discharge: HOME | End: 2021-10-26
Payer: MEDICAID

## 2021-10-26 VITALS
OXYGEN SATURATION: 100 % | WEIGHT: 187.2 LBS | HEIGHT: 61 IN | TEMPERATURE: 97.2 F | BODY MASS INDEX: 35.34 KG/M2 | DIASTOLIC BLOOD PRESSURE: 80 MMHG | HEART RATE: 80 BPM | SYSTOLIC BLOOD PRESSURE: 130 MMHG

## 2021-10-26 DIAGNOSIS — R53.83 FATIGUE, UNSPECIFIED TYPE: ICD-10-CM

## 2021-10-26 DIAGNOSIS — F32.A DEPRESSION, UNSPECIFIED DEPRESSION TYPE: ICD-10-CM

## 2021-10-26 DIAGNOSIS — E03.9 HYPOTHYROIDISM, UNSPECIFIED TYPE: ICD-10-CM

## 2021-10-26 DIAGNOSIS — E53.8 B12 DEFICIENCY: ICD-10-CM

## 2021-10-26 DIAGNOSIS — M25.562 CHRONIC PAIN OF LEFT KNEE: Primary | ICD-10-CM

## 2021-10-26 DIAGNOSIS — G89.29 CHRONIC PAIN OF LEFT KNEE: Primary | ICD-10-CM

## 2021-10-26 PROCEDURE — G8427 DOCREV CUR MEDS BY ELIG CLIN: HCPCS | Performed by: FAMILY MEDICINE

## 2021-10-26 PROCEDURE — 1036F TOBACCO NON-USER: CPT | Performed by: FAMILY MEDICINE

## 2021-10-26 PROCEDURE — 80053 COMPREHEN METABOLIC PANEL: CPT

## 2021-10-26 PROCEDURE — 85027 COMPLETE CBC AUTOMATED: CPT

## 2021-10-26 PROCEDURE — 84439 ASSAY OF FREE THYROXINE: CPT

## 2021-10-26 PROCEDURE — G8417 CALC BMI ABV UP PARAM F/U: HCPCS | Performed by: FAMILY MEDICINE

## 2021-10-26 PROCEDURE — 99214 OFFICE O/P EST MOD 30 MIN: CPT | Performed by: FAMILY MEDICINE

## 2021-10-26 PROCEDURE — G8484 FLU IMMUNIZE NO ADMIN: HCPCS | Performed by: FAMILY MEDICINE

## 2021-10-26 PROCEDURE — 84443 ASSAY THYROID STIM HORMONE: CPT

## 2021-10-26 RX ORDER — CYANOCOBALAMIN 1000 UG/ML
1000 INJECTION INTRAMUSCULAR; SUBCUTANEOUS ONCE
Status: COMPLETED | OUTPATIENT
Start: 2021-10-26 | End: 2021-10-26

## 2021-10-26 RX ORDER — ONDANSETRON HYDROCHLORIDE 8 MG/1
TABLET, FILM COATED ORAL
COMMUNITY
Start: 2021-10-19 | End: 2022-01-25

## 2021-10-26 RX ADMIN — CYANOCOBALAMIN 1000 MCG: 1000 INJECTION INTRAMUSCULAR; SUBCUTANEOUS at 17:05

## 2021-10-26 ASSESSMENT — ENCOUNTER SYMPTOMS
COUGH: 0
COLOR CHANGE: 0
VOMITING: 0
DIARRHEA: 0
SHORTNESS OF BREATH: 0
CHEST TIGHTNESS: 0
BACK PAIN: 1
ABDOMINAL PAIN: 0
EYE REDNESS: 0
TROUBLE SWALLOWING: 0
SORE THROAT: 0
NAUSEA: 0
EYE PAIN: 0

## 2021-10-26 NOTE — PROGRESS NOTES
SUBJECTIVE:    Patient ID: Marcellus Canchola is a 42 y.o. female. Chief Complaint   Patient presents with    Knee Pain       HPI: office visit  Is in the office today to follow-up on her thyroid issues. She says she feels like she is doing pretty well with her medicine. She is still seeing pain management for her back. Her knee is really bothering her though. She says that she is having difficulty with that more than she has been. She has not fallen or had any injuries. She denies any chest pain shortness of breath or other symptoms. She is not have any medication problems that she can tell. Review of Systems   Constitutional: Negative for activity change, appetite change, chills and fever. HENT: Negative for congestion, ear pain, nosebleeds, sore throat and trouble swallowing. Eyes: Negative for pain and redness. Respiratory: Negative for cough, chest tightness and shortness of breath. Cardiovascular: Negative for chest pain, palpitations and leg swelling. Gastrointestinal: Negative for abdominal pain, diarrhea, nausea and vomiting. Genitourinary: Negative for difficulty urinating, dysuria and flank pain. Musculoskeletal: Positive for arthralgias and back pain. Negative for gait problem. Skin: Negative for color change, pallor, rash and wound. Allergic/Immunologic: Negative for environmental allergies and food allergies. Neurological: Negative for dizziness, numbness and headaches. Hematological: Negative for adenopathy. Does not bruise/bleed easily. Psychiatric/Behavioral: Negative for agitation and sleep disturbance. The patient is not nervous/anxious.          OBJECTIVE:  /80 (Site: Right Upper Arm, Position: Sitting)   Pulse 80   Temp 97.2 °F (36.2 °C) (Temporal)   Ht 5' 1\" (1.549 m)   Wt 187 lb 3.2 oz (84.9 kg)   SpO2 100%   BMI 35.37 kg/m²    Wt Readings from Last 3 Encounters:   10/26/21 187 lb 3.2 oz (84.9 kg)   05/04/21 190 lb 1.6 oz (86.2 kg)   02/04/21 199 lb 8 oz (90.5 kg)     BP Readings from Last 3 Encounters:   10/26/21 130/80   05/04/21 110/72   02/04/21 138/70      Pulse Readings from Last 3 Encounters:   10/26/21 80   05/04/21 90   02/04/21 71     Body mass index is 35.37 kg/m². Resp Readings from Last 3 Encounters:   05/04/21 18   02/04/21 18   11/04/20 18     Past medical, surgical, family and social history were reviewed and updated with the patient. Physical Exam  Vitals and nursing note reviewed. Constitutional:       Appearance: Normal appearance. She is well-developed. HENT:      Head: Normocephalic and atraumatic. Right Ear: Hearing and external ear normal.      Left Ear: Hearing and external ear normal.      Nose: Nose normal.      Mouth/Throat:      Pharynx: Uvula midline. Eyes:      General: Lids are normal.      Conjunctiva/sclera: Conjunctivae normal.      Pupils: Pupils are equal, round, and reactive to light. Neck:      Thyroid: No thyroid mass or thyromegaly. Vascular: No carotid bruit. Trachea: Trachea and phonation normal.   Cardiovascular:      Rate and Rhythm: Normal rate and regular rhythm. Pulses: Normal pulses. Heart sounds: Normal heart sounds, S1 normal and S2 normal. No murmur heard. No friction rub. No gallop. Pulmonary:      Effort: Pulmonary effort is normal.      Breath sounds: Normal breath sounds. Genitourinary:     Exam position: Supine. Labia:         Right: No rash, tenderness, lesion or injury. Left: No rash, tenderness, lesion or injury. Vagina: No signs of injury and foreign body. No vaginal discharge, erythema, tenderness or bleeding. Rectum: Normal.      Comments: Status post hysterectomy    Musculoskeletal:      Cervical back: Normal range of motion and neck supple. Lumbar back: Spasms and tenderness present. Decreased range of motion. Right knee: Decreased range of motion. Tenderness present over the medial joint line.       Comments: Right si joint tenderness    Skin:     General: Skin is warm and dry. Neurological:      Mental Status: She is alert and oriented to person, place, and time. No results found for requested labs within last 30 days. Hemoglobin A1C (%)   Date Value   02/06/2020 5.4     LDL Calculated (mg/dL)   Date Value   02/04/2021 71       Lab Results   Component Value Date    WBC 6.9 05/04/2021    NEUTROABS 5.0 04/24/2017    HGB 9.8 05/04/2021    HCT 33.6 05/04/2021    MCV 84.0 05/04/2021     05/04/2021     Lab Results   Component Value Date    TSH 0.03 (L) 05/04/2021       ASSESSMENT:    Diagnosis Orders   1. Chronic pain of left knee  XR KNEE LEFT (MIN 4 VIEWS)   2. Depression, unspecified depression type     3. B12 deficiency     4. Hypothyroidism, unspecified type  TSH without Reflex    T4, FREE   5. Fatigue, unspecified type  CBC    COMPREHENSIVE METABOLIC PANEL        PLAN:  Orders Placed This Encounter   Medications    cyanocobalamin injection 1,000 mcg        There are no discontinued medications. Controlled Substances Monitoring:      Please note: This chart was generated using Dragon dictation software. Although every effort was made to ensure the accuracy of this automated transcription, some errors in transcription may have occurred.

## 2021-10-26 NOTE — PROGRESS NOTES
Chief Complaint   Patient presents with    Knee Pain     Pt here today for f/u on left knee pain     Have you seen any other physician or provider since your last visit yes - pain management     Have you had any other diagnostic tests since your last visit? no    Have you changed or stopped any medications since your last visit? no

## 2021-10-27 LAB
A/G RATIO: 2 (ref 0.8–2)
ALBUMIN SERPL-MCNC: 4.4 G/DL (ref 3.4–4.8)
ALP BLD-CCNC: 106 U/L (ref 25–100)
ALT SERPL-CCNC: <5 U/L (ref 4–36)
ANION GAP SERPL CALCULATED.3IONS-SCNC: 11 MMOL/L (ref 3–16)
AST SERPL-CCNC: 12 U/L (ref 8–33)
BILIRUB SERPL-MCNC: <0.2 MG/DL (ref 0.3–1.2)
BUN BLDV-MCNC: 8 MG/DL (ref 6–20)
CALCIUM SERPL-MCNC: 9.5 MG/DL (ref 8.5–10.5)
CHLORIDE BLD-SCNC: 105 MMOL/L (ref 98–107)
CO2: 26 MMOL/L (ref 20–30)
CREAT SERPL-MCNC: 0.7 MG/DL (ref 0.4–1.2)
GFR AFRICAN AMERICAN: >59
GFR NON-AFRICAN AMERICAN: >60
GLOBULIN: 2.2 G/DL
GLUCOSE BLD-MCNC: 87 MG/DL (ref 74–106)
HCT VFR BLD CALC: 33.3 % (ref 37–47)
HEMOGLOBIN: 9.8 G/DL (ref 11.5–16.5)
MCH RBC QN AUTO: 24.3 PG (ref 27–32)
MCHC RBC AUTO-ENTMCNC: 29.4 G/DL (ref 31–35)
MCV RBC AUTO: 82.4 FL (ref 80–100)
PDW BLD-RTO: 16.7 % (ref 11–16)
PLATELET # BLD: 290 K/UL (ref 150–400)
PMV BLD AUTO: 12.2 FL (ref 6–10)
POTASSIUM SERPL-SCNC: 4.2 MMOL/L (ref 3.4–5.1)
RBC # BLD: 4.04 M/UL (ref 3.8–5.8)
SODIUM BLD-SCNC: 142 MMOL/L (ref 136–145)
T4 FREE: 2.56 NG/DL (ref 0.89–1.76)
TOTAL PROTEIN: 6.6 G/DL (ref 6.4–8.3)
TSH SERPL DL<=0.05 MIU/L-ACNC: <0.01 UIU/ML (ref 0.27–4.2)
WBC # BLD: 6.5 K/UL (ref 4–11)

## 2021-10-27 RX ORDER — LEVOTHYROXINE SODIUM 137 UG/1
137 TABLET ORAL DAILY
Qty: 90 TABLET | Refills: 1 | Status: SHIPPED | OUTPATIENT
Start: 2021-10-27 | End: 2022-01-25 | Stop reason: SDUPTHER

## 2021-11-01 ENCOUNTER — HOSPITAL ENCOUNTER (OUTPATIENT)
Facility: HOSPITAL | Age: 46
Discharge: HOME | End: 2021-11-01
Payer: MEDICAID

## 2021-11-01 ENCOUNTER — HOSPITAL ENCOUNTER (OUTPATIENT)
Dept: GENERAL RADIOLOGY | Facility: HOSPITAL | Age: 46
Discharge: HOME | End: 2021-11-01
Payer: MEDICAID

## 2021-11-01 ENCOUNTER — TELEPHONE (OUTPATIENT)
Dept: FAMILY MEDICINE CLINIC | Age: 46
End: 2021-11-01

## 2021-11-01 DIAGNOSIS — G89.29 CHRONIC PAIN OF LEFT KNEE: ICD-10-CM

## 2021-11-01 DIAGNOSIS — M25.562 CHRONIC PAIN OF LEFT KNEE: ICD-10-CM

## 2021-11-01 PROCEDURE — 73562 X-RAY EXAM OF KNEE 3: CPT

## 2021-11-01 NOTE — TELEPHONE ENCOUNTER
----- Message from Marco Pryor sent at 10/29/2021  1:12 PM EDT -----  Subject: Message to Provider    QUESTIONS  Information for Provider? Pt called in and stated received a call and was   returning the call but office is closed early on Friday if someone can   give pt a call back . ---------------------------------------------------------------------------  --------------  Vero MARCH  What is the best way for the office to contact you? OK to leave message on   voicemail  Preferred Call Back Phone Number? [phone]  ---------------------------------------------------------------------------  --------------  SCRIPT ANSWERS  Relationship to Patient?  Self

## 2021-11-03 NOTE — TELEPHONE ENCOUNTER
Refill request received from pharmacy. Medication pending for approval.       Last Office Visit With PCP:  8/14/2020    Next Visit Date:  No future appointments.     VERA Up to date Wartpeel Counseling:  I discussed with the patient the risks of Wartpeel including but not limited to erythema, scaling, itching, weeping, crusting, and pain.

## 2021-11-18 NOTE — TELEPHONE ENCOUNTER
Patient called, requested refill.        Next Office Visit Date:  Future Appointments   Date Time Provider Petr Young   1/25/2022  4:45 PM Jim Tony MD [address] please review via [address]

## 2021-11-29 RX ORDER — ERGOCALCIFEROL 1.25 MG/1
CAPSULE ORAL
Qty: 4 CAPSULE | Refills: 0 | Status: SHIPPED | OUTPATIENT
Start: 2021-11-29 | End: 2021-12-28

## 2021-11-29 NOTE — TELEPHONE ENCOUNTER
Patient called, requested refill.        Next Office Visit Date:  Future Appointments   Date Time Provider Petr Hannah   1/25/2022  4:45 PM Josue Hassan MD [address] please review via [address]

## 2021-12-20 RX ORDER — FLUTICASONE PROPIONATE 50 MCG
SPRAY, SUSPENSION (ML) NASAL
Qty: 16 G | Refills: 5 | Status: SHIPPED | OUTPATIENT
Start: 2021-12-20 | End: 2022-06-20

## 2021-12-20 NOTE — TELEPHONE ENCOUNTER
Patient called, requested refill.        Next Office Visit Date:  Future Appointments   Date Time Provider Petr Young   1/25/2022  4:45 PM Elsy Pires MD [address] please review via [address]

## 2021-12-28 DIAGNOSIS — R63.5 WEIGHT GAIN: ICD-10-CM

## 2021-12-28 RX ORDER — ERGOCALCIFEROL 1.25 MG/1
CAPSULE ORAL
Qty: 4 CAPSULE | Refills: 0 | Status: SHIPPED | OUTPATIENT
Start: 2021-12-28 | End: 2022-07-21

## 2021-12-28 RX ORDER — PHENTERMINE HYDROCHLORIDE 37.5 MG/1
TABLET ORAL
Qty: 30 TABLET | Refills: 0 | Status: SHIPPED | OUTPATIENT
Start: 2021-12-28 | End: 2022-01-25 | Stop reason: SDUPTHER

## 2021-12-28 NOTE — TELEPHONE ENCOUNTER
Patient called, requested refill.        Next Office Visit Date:  Future Appointments   Date Time Provider Petr Young   1/25/2022  4:45 PM Maddie Chiu MD [address] please review via [address]

## 2022-01-18 DIAGNOSIS — F41.9 ANXIETY: ICD-10-CM

## 2022-01-18 RX ORDER — CLONAZEPAM 0.5 MG/1
TABLET ORAL
Qty: 45 TABLET | Refills: 0 | Status: SHIPPED | OUTPATIENT
Start: 2022-01-18 | End: 2022-01-25 | Stop reason: SDUPTHER

## 2022-01-18 NOTE — TELEPHONE ENCOUNTER
Refill request received from pharmacy.  Medication pending for approval.       Last Office Visit With PCP:  10/26/2021    Next Visit Date:  Future Appointments   Date Time Provider Petr Young   1/25/2022  4:45 PM [facility]

## 2022-01-25 ENCOUNTER — OFFICE VISIT (OUTPATIENT)
Dept: FAMILY MEDICINE CLINIC | Age: 47
End: 2022-01-25
Payer: MEDICAID

## 2022-01-25 ENCOUNTER — HOSPITAL ENCOUNTER (OUTPATIENT)
Facility: HOSPITAL | Age: 47
Discharge: HOME | End: 2022-01-25
Payer: MEDICAID

## 2022-01-25 VITALS
DIASTOLIC BLOOD PRESSURE: 68 MMHG | WEIGHT: 195 LBS | BODY MASS INDEX: 36.82 KG/M2 | RESPIRATION RATE: 18 BRPM | HEART RATE: 76 BPM | OXYGEN SATURATION: 95 % | HEIGHT: 61 IN | SYSTOLIC BLOOD PRESSURE: 138 MMHG | TEMPERATURE: 97.5 F

## 2022-01-25 DIAGNOSIS — R63.5 WEIGHT GAIN: ICD-10-CM

## 2022-01-25 DIAGNOSIS — Z13.220 SCREENING, LIPID: ICD-10-CM

## 2022-01-25 DIAGNOSIS — E53.8 B12 DEFICIENCY: ICD-10-CM

## 2022-01-25 DIAGNOSIS — R22.1 LOCALIZED SWELLING, MASS AND LUMP, NECK: Primary | ICD-10-CM

## 2022-01-25 DIAGNOSIS — R53.83 FATIGUE, UNSPECIFIED TYPE: ICD-10-CM

## 2022-01-25 DIAGNOSIS — E03.9 HYPOTHYROIDISM, UNSPECIFIED TYPE: ICD-10-CM

## 2022-01-25 DIAGNOSIS — F41.9 ANXIETY: ICD-10-CM

## 2022-01-25 DIAGNOSIS — R22.1 LOCALIZED SWELLING, MASS AND LUMP, NECK: ICD-10-CM

## 2022-01-25 PROCEDURE — 36415 COLL VENOUS BLD VENIPUNCTURE: CPT

## 2022-01-25 PROCEDURE — G8484 FLU IMMUNIZE NO ADMIN: HCPCS | Performed by: FAMILY MEDICINE

## 2022-01-25 PROCEDURE — G8417 CALC BMI ABV UP PARAM F/U: HCPCS | Performed by: FAMILY MEDICINE

## 2022-01-25 PROCEDURE — 99214 OFFICE O/P EST MOD 30 MIN: CPT | Performed by: FAMILY MEDICINE

## 2022-01-25 PROCEDURE — 84439 ASSAY OF FREE THYROXINE: CPT

## 2022-01-25 PROCEDURE — 82746 ASSAY OF FOLIC ACID SERUM: CPT

## 2022-01-25 PROCEDURE — 1036F TOBACCO NON-USER: CPT | Performed by: FAMILY MEDICINE

## 2022-01-25 PROCEDURE — 84443 ASSAY THYROID STIM HORMONE: CPT

## 2022-01-25 PROCEDURE — 85027 COMPLETE CBC AUTOMATED: CPT

## 2022-01-25 PROCEDURE — 80061 LIPID PANEL: CPT

## 2022-01-25 PROCEDURE — G8427 DOCREV CUR MEDS BY ELIG CLIN: HCPCS | Performed by: FAMILY MEDICINE

## 2022-01-25 PROCEDURE — 82607 VITAMIN B-12: CPT

## 2022-01-25 RX ORDER — BUPROPION HYDROCHLORIDE 300 MG/1
TABLET ORAL
Qty: 90 TABLET | Refills: 3 | Status: SHIPPED | OUTPATIENT
Start: 2022-01-25 | End: 2022-10-18 | Stop reason: SDUPTHER

## 2022-01-25 RX ORDER — CLONAZEPAM 0.5 MG/1
TABLET ORAL
Qty: 45 TABLET | Refills: 2 | Status: SHIPPED | OUTPATIENT
Start: 2022-01-25 | End: 2022-04-21 | Stop reason: SDUPTHER

## 2022-01-25 RX ORDER — CYANOCOBALAMIN 1000 UG/ML
1000 INJECTION INTRAMUSCULAR; SUBCUTANEOUS ONCE
Status: COMPLETED | OUTPATIENT
Start: 2022-01-25 | End: 2022-01-25

## 2022-01-25 RX ORDER — MONTELUKAST SODIUM 10 MG/1
TABLET ORAL
Qty: 90 TABLET | Refills: 3 | Status: SHIPPED | OUTPATIENT
Start: 2022-01-25 | End: 2022-10-18 | Stop reason: SDUPTHER

## 2022-01-25 RX ORDER — FUROSEMIDE 20 MG/1
TABLET ORAL
Qty: 90 TABLET | Refills: 3 | Status: SHIPPED | OUTPATIENT
Start: 2022-01-25 | End: 2022-10-18 | Stop reason: SDUPTHER

## 2022-01-25 RX ORDER — NAPROXEN 500 MG/1
500 TABLET ORAL 2 TIMES DAILY
Qty: 180 TABLET | Refills: 3 | Status: SHIPPED | OUTPATIENT
Start: 2022-01-25

## 2022-01-25 RX ORDER — DEXLANSOPRAZOLE 60 MG/1
CAPSULE, DELAYED RELEASE ORAL
Qty: 90 CAPSULE | Refills: 3 | Status: SHIPPED | OUTPATIENT
Start: 2022-01-25 | End: 2022-10-18 | Stop reason: SDUPTHER

## 2022-01-25 RX ORDER — LEVOTHYROXINE SODIUM 137 UG/1
137 TABLET ORAL DAILY
Qty: 90 TABLET | Refills: 3 | Status: SHIPPED | OUTPATIENT
Start: 2022-01-25 | End: 2022-01-26

## 2022-01-25 RX ORDER — PHENTERMINE HYDROCHLORIDE 37.5 MG/1
TABLET ORAL
Qty: 30 TABLET | Refills: 2 | Status: SHIPPED | OUTPATIENT
Start: 2022-01-25 | End: 2022-04-21 | Stop reason: SDUPTHER

## 2022-01-25 RX ORDER — LEVOCETIRIZINE DIHYDROCHLORIDE 5 MG/1
TABLET, FILM COATED ORAL
Qty: 90 TABLET | Refills: 3 | Status: SHIPPED | OUTPATIENT
Start: 2022-01-25 | End: 2022-10-18 | Stop reason: SDUPTHER

## 2022-01-25 RX ORDER — FAMOTIDINE 40 MG/1
TABLET, FILM COATED ORAL
Qty: 180 TABLET | Refills: 3 | Status: SHIPPED | OUTPATIENT
Start: 2022-01-25 | End: 2022-10-18 | Stop reason: SDUPTHER

## 2022-01-25 RX ADMIN — CYANOCOBALAMIN 1000 MCG: 1000 INJECTION INTRAMUSCULAR; SUBCUTANEOUS at 18:05

## 2022-01-25 SDOH — ECONOMIC STABILITY: FOOD INSECURITY: WITHIN THE PAST 12 MONTHS, THE FOOD YOU BOUGHT JUST DIDN'T LAST AND YOU DIDN'T HAVE MONEY TO GET MORE.: NEVER TRUE

## 2022-01-25 SDOH — ECONOMIC STABILITY: FOOD INSECURITY: WITHIN THE PAST 12 MONTHS, YOU WORRIED THAT YOUR FOOD WOULD RUN OUT BEFORE YOU GOT MONEY TO BUY MORE.: NEVER TRUE

## 2022-01-25 ASSESSMENT — PATIENT HEALTH QUESTIONNAIRE - PHQ9
SUM OF ALL RESPONSES TO PHQ QUESTIONS 1-9: 6
5. POOR APPETITE OR OVEREATING: 1
6. FEELING BAD ABOUT YOURSELF - OR THAT YOU ARE A FAILURE OR HAVE LET YOURSELF OR YOUR FAMILY DOWN: 1
10. IF YOU CHECKED OFF ANY PROBLEMS, HOW DIFFICULT HAVE THESE PROBLEMS MADE IT FOR YOU TO DO YOUR WORK, TAKE CARE OF THINGS AT HOME, OR GET ALONG WITH OTHER PEOPLE: 1
8. MOVING OR SPEAKING SO SLOWLY THAT OTHER PEOPLE COULD HAVE NOTICED. OR THE OPPOSITE, BEING SO FIGETY OR RESTLESS THAT YOU HAVE BEEN MOVING AROUND A LOT MORE THAN USUAL: 1
7. TROUBLE CONCENTRATING ON THINGS, SUCH AS READING THE NEWSPAPER OR WATCHING TELEVISION: 1
3. TROUBLE FALLING OR STAYING ASLEEP: 1
SUM OF ALL RESPONSES TO PHQ QUESTIONS 1-9: 6
2. FEELING DOWN, DEPRESSED OR HOPELESS: 0
SUM OF ALL RESPONSES TO PHQ QUESTIONS 1-9: 6
9. THOUGHTS THAT YOU WOULD BE BETTER OFF DEAD, OR OF HURTING YOURSELF: 0
SUM OF ALL RESPONSES TO PHQ QUESTIONS 1-9: 6
4. FEELING TIRED OR HAVING LITTLE ENERGY: 1

## 2022-01-25 ASSESSMENT — ENCOUNTER SYMPTOMS
BACK PAIN: 1
DIARRHEA: 0
COUGH: 0
SORE THROAT: 0
COLOR CHANGE: 0
TROUBLE SWALLOWING: 0
ABDOMINAL PAIN: 0
SHORTNESS OF BREATH: 0
EYE REDNESS: 0
CHEST TIGHTNESS: 0
NAUSEA: 0
VOMITING: 0
EYE PAIN: 0

## 2022-01-25 ASSESSMENT — SOCIAL DETERMINANTS OF HEALTH (SDOH): HOW HARD IS IT FOR YOU TO PAY FOR THE VERY BASICS LIKE FOOD, HOUSING, MEDICAL CARE, AND HEATING?: NOT VERY HARD

## 2022-01-25 NOTE — PROGRESS NOTES
Chief Complaint   Patient presents with    COPD    Alopecia     \" I think something is wrong with my thyroid again\"       Have you seen any other physician or provider since your last visit yes - pain clinic    Have you had any other diagnostic tests since your last visit? yes - blood work    Have you changed or stopped any medications since your last visit? yes - Cymbalta         Administrations This Visit     cyanocobalamin injection 1,000 mcg     Admin Date  01/25/2022  18:05 Action  Given Dose  1,000 mcg Route  IntraMUSCular Site  Deltoid Left Administered By  Arabella Srivastava MA    Ordering Provider: Bobo Murphy MD    NDC: 80032-928-36    [facility]    Patient Supplied?: No                Patient tolerated injection well. Patient advised to wait 20 minutes in the office following the injection. No signs/symptoms of reaction noted after 20 minutes.

## 2022-01-25 NOTE — PROGRESS NOTES
SUBJECTIVE:    Patient ID: Sayda Meyers is a 41 y.o. female. Chief Complaint   Patient presents with    COPD    Alopecia     \" I think something is wrong with my thyroid again\"       HPI: office visit  She is in the office today in follow-up of her COPD. She is doing pretty well other than she says she has had some significant fatigue. She thinks she is having some trouble with her thyroid. She has had some hair loss. She has had some spots of hair to fall out. She has not had any significant medication side effects that she can tell. She is continue to see pain management. She is having some left sided neck swelling. She feels like there is a lump in that area. She says that she is seeming to notice it more recently. She says that it is not that well on the other side. Review of Systems   Constitutional: Negative for activity change, appetite change, chills and fever. HENT: Negative for congestion, ear pain, nosebleeds, sore throat and trouble swallowing. Eyes: Negative for pain and redness. Respiratory: Negative for cough, chest tightness and shortness of breath. Cardiovascular: Negative for chest pain, palpitations and leg swelling. Gastrointestinal: Negative for abdominal pain, diarrhea, nausea and vomiting. Genitourinary: Negative for difficulty urinating, dysuria and flank pain. Musculoskeletal: Positive for arthralgias and back pain. Negative for gait problem. Skin: Negative for color change, pallor, rash and wound. Allergic/Immunologic: Negative for environmental allergies and food allergies. Neurological: Negative for dizziness, numbness and headaches. Hematological: Negative for adenopathy. Does not bruise/bleed easily. Psychiatric/Behavioral: Negative for agitation and sleep disturbance. The patient is not nervous/anxious. All other systems reviewed and are negative.        OBJECTIVE:  /68   Pulse 76   Temp 97.5 °F (36.4 °C)   Resp 18   Ht 5' 1\" (1.549 m)   Wt 195 lb (88.5 kg)   SpO2 95%   Breastfeeding No   BMI 36.84 kg/m²    Wt Readings from Last 3 Encounters:   01/25/22 195 lb (88.5 kg)   10/26/21 187 lb 3.2 oz (84.9 kg)   05/04/21 190 lb 1.6 oz (86.2 kg)     BP Readings from Last 3 Encounters:   01/25/22 138/68   10/26/21 130/80   05/04/21 110/72      Pulse Readings from Last 3 Encounters:   01/25/22 76   10/26/21 80   05/04/21 90     Body mass index is 36.84 kg/m². Resp Readings from Last 3 Encounters:   01/25/22 18   05/04/21 18   02/04/21 18     Past medical, surgical, family and social history were reviewed and updated with the patient. Physical Exam  Vitals and nursing note reviewed. Constitutional:       Appearance: Normal appearance. She is well-developed. HENT:      Head: Normocephalic and atraumatic. Right Ear: Hearing and external ear normal.      Left Ear: Hearing and external ear normal.      Nose: Nose normal.      Mouth/Throat:      Pharynx: Uvula midline. Eyes:      General: Lids are normal.      Conjunctiva/sclera: Conjunctivae normal.      Pupils: Pupils are equal, round, and reactive to light. Neck:      Thyroid: No thyroid mass or thyromegaly. Vascular: No carotid bruit. Trachea: Trachea and phonation normal.   Cardiovascular:      Rate and Rhythm: Normal rate and regular rhythm. Pulses: Normal pulses. Heart sounds: Normal heart sounds, S1 normal and S2 normal. No murmur heard. No friction rub. No gallop. Pulmonary:      Effort: Pulmonary effort is normal.      Breath sounds: Normal breath sounds. Genitourinary:     Exam position: Supine. Labia:         Right: No rash, tenderness, lesion or injury. Left: No rash, tenderness, lesion or injury. Vagina: No signs of injury and foreign body. No vaginal discharge, erythema, tenderness or bleeding.       Rectum: Normal.      Comments: Status post hysterectomy    Musculoskeletal:      Cervical back: Normal range of motion and neck supple. Lumbar back: Spasms and tenderness present. Decreased range of motion. Right knee: Decreased range of motion. Tenderness present over the medial joint line. Comments: Right si joint tenderness    Skin:     General: Skin is warm and dry. Neurological:      Mental Status: She is alert and oriented to person, place, and time. Psychiatric:         Attention and Perception: Attention and perception normal.         Mood and Affect: Affect normal. Mood is anxious. Speech: Speech normal.         Behavior: Behavior normal. Behavior is cooperative. Thought Content: Thought content normal.         Cognition and Memory: Cognition and memory normal.         Judgment: Judgment normal.          No results found for requested labs within last 30 days. Hemoglobin A1C (%)   Date Value   02/06/2020 5.4     LDL Calculated (mg/dL)   Date Value   01/25/2022 102       Lab Results   Component Value Date    WBC 7.9 01/25/2022    NEUTROABS 5.0 04/24/2017    HGB 9.6 01/25/2022    HCT 33.6 01/25/2022    MCV 84.4 01/25/2022     01/25/2022     Lab Results   Component Value Date    TSH <0.01 (L) 01/25/2022       ASSESSMENT:    Diagnosis Orders   1. Localized swelling, mass and lump, neck  US THYROID   2. Anxiety  clonazePAM (KLONOPIN) 0.5 MG tablet   3. Weight gain  phentermine (ADIPEX-P) 37.5 MG tablet   4. Hypothyroidism, unspecified type  TSH without Reflex    T4, FREE    US THYROID   5. B12 deficiency  cyanocobalamin injection 1,000 mcg   6. Fatigue, unspecified type  CBC    VITAMIN B12 & FOLATE   7.  Screening, lipid  LIPID PANEL        PLAN:  Orders Placed This Encounter   Medications    clonazePAM (KLONOPIN) 0.5 MG tablet     Sig: TAKE 1/2 TABLET EVERY MORNING AND 1 TABLET IN EVENING     Dispense:  45 tablet     Refill:  2    phentermine (ADIPEX-P) 37.5 MG tablet     Sig: Take one tablet daily     Dispense:  30 tablet     Refill:  2    DISCONTD: levothyroxine (SYNTHROID) 137 MCG tablet     Sig: Take 1 tablet by mouth daily     Dispense:  90 tablet     Refill:  3    DEXILANT 60 MG CPDR delayed release capsule     Sig: TAKE ONE CAPSULE DAILY     Dispense:  90 capsule     Refill:  3    buPROPion (WELLBUTRIN XL) 300 MG extended release tablet     Sig: TAKE ONE TABLET EVERY MORNING     Dispense:  90 tablet     Refill:  3    furosemide (LASIX) 20 MG tablet     Sig: TAKE ONE TABLET BY MOUTH EVERY DAY     Dispense:  90 tablet     Refill:  3    famotidine (PEPCID) 40 MG tablet     Sig: Take one tablet daily     Dispense:  180 tablet     Refill:  3    levocetirizine (XYZAL) 5 MG tablet     Sig: TAKE ONE TABLET DAILY     Dispense:  90 tablet     Refill:  3    montelukast (SINGULAIR) 10 MG tablet     Sig: TAKE ONE TABLET BY MOUTH NIGHTLY     Dispense:  90 tablet     Refill:  3    naproxen (NAPROSYN) 500 MG tablet     Sig: Take 1 tablet by mouth 2 times daily     Dispense:  180 tablet     Refill:  3    cyanocobalamin injection 1,000 mcg        Medications Discontinued During This Encounter   Medication Reason    ondansetron (ZOFRAN) 8 MG tablet LIST CLEANUP    oxybutynin (DITROPAN-XL) 10 MG extended release tablet LIST CLEANUP    buPROPion (WELLBUTRIN XL) 300 MG extended release tablet REORDER    furosemide (LASIX) 20 MG tablet REORDER    famotidine (PEPCID) 40 MG tablet REORDER    levocetirizine (XYZAL) 5 MG tablet REORDER    montelukast (SINGULAIR) 10 MG tablet REORDER    naproxen (NAPROSYN) 500 MG tablet REORDER    DEXILANT 60 MG CPDR delayed release capsule REORDER    levothyroxine (SYNTHROID) 137 MCG tablet REORDER    phentermine (ADIPEX-P) 37.5 MG tablet REORDER    clonazePAM (KLONOPIN) 0.5 MG tablet REORDER       Controlled Substances Monitoring:      Please note: This chart was generated using Dragon dictation software. Although every effort was made to ensure the accuracy of this automated transcription, some errors in transcription may have occurred.

## 2022-01-26 LAB
CHOLESTEROL, TOTAL: 195 MG/DL (ref 0–200)
FOLATE: 10.84 NG/ML
HCT VFR BLD CALC: 33.6 % (ref 37–47)
HDLC SERPL-MCNC: 63 MG/DL (ref 40–60)
HEMOGLOBIN: 9.6 G/DL (ref 11.5–16.5)
LDL CHOLESTEROL CALCULATED: 102 MG/DL
MCH RBC QN AUTO: 24.1 PG (ref 27–32)
MCHC RBC AUTO-ENTMCNC: 28.6 G/DL (ref 31–35)
MCV RBC AUTO: 84.4 FL (ref 80–100)
PDW BLD-RTO: 15.3 % (ref 11–16)
PLATELET # BLD: 332 K/UL (ref 150–400)
PMV BLD AUTO: 10.8 FL (ref 6–10)
RBC # BLD: 3.98 M/UL (ref 3.8–5.8)
T4 FREE: 2.27 NG/DL (ref 0.89–1.76)
TRIGL SERPL-MCNC: 152 MG/DL (ref 0–249)
TSH SERPL DL<=0.05 MIU/L-ACNC: <0.01 UIU/ML (ref 0.27–4.2)
VITAMIN B-12: >2000 PG/ML (ref 211–911)
VLDLC SERPL CALC-MCNC: 30 MG/DL
WBC # BLD: 7.9 K/UL (ref 4–11)

## 2022-01-26 RX ORDER — LEVOTHYROXINE SODIUM 112 UG/1
112 TABLET ORAL DAILY
Qty: 30 TABLET | Refills: 1 | Status: SHIPPED | OUTPATIENT
Start: 2022-01-26 | End: 2022-04-20

## 2022-03-07 ENCOUNTER — HOSPITAL ENCOUNTER (OUTPATIENT)
Dept: ULTRASOUND IMAGING | Facility: HOSPITAL | Age: 47
Discharge: HOME | End: 2022-03-07
Payer: MEDICAID

## 2022-03-07 DIAGNOSIS — R22.1 LOCALIZED SWELLING, MASS AND LUMP, NECK: ICD-10-CM

## 2022-03-07 DIAGNOSIS — E03.9 HYPOTHYROIDISM, UNSPECIFIED TYPE: ICD-10-CM

## 2022-03-07 PROCEDURE — 76536 US EXAM OF HEAD AND NECK: CPT

## 2022-04-20 RX ORDER — LEVOTHYROXINE SODIUM 112 UG/1
TABLET ORAL
Qty: 30 TABLET | Refills: 0 | Status: SHIPPED | OUTPATIENT
Start: 2022-04-20 | End: 2022-04-21 | Stop reason: SDUPTHER

## 2022-04-21 ENCOUNTER — HOSPITAL ENCOUNTER (OUTPATIENT)
Facility: HOSPITAL | Age: 47
Discharge: HOME | End: 2022-04-21
Payer: MEDICAID

## 2022-04-21 ENCOUNTER — OFFICE VISIT (OUTPATIENT)
Dept: FAMILY MEDICINE CLINIC | Age: 47
End: 2022-04-21
Payer: MEDICAID

## 2022-04-21 VITALS
OXYGEN SATURATION: 95 % | SYSTOLIC BLOOD PRESSURE: 126 MMHG | DIASTOLIC BLOOD PRESSURE: 70 MMHG | BODY MASS INDEX: 37.81 KG/M2 | RESPIRATION RATE: 18 BRPM | HEART RATE: 113 BPM | HEIGHT: 60 IN | TEMPERATURE: 97.7 F | WEIGHT: 192.6 LBS

## 2022-04-21 DIAGNOSIS — J43.8 OTHER EMPHYSEMA (HCC): ICD-10-CM

## 2022-04-21 DIAGNOSIS — E53.8 B12 DEFICIENCY: ICD-10-CM

## 2022-04-21 DIAGNOSIS — R53.83 FATIGUE, UNSPECIFIED TYPE: ICD-10-CM

## 2022-04-21 DIAGNOSIS — E03.9 HYPOTHYROIDISM, UNSPECIFIED TYPE: Primary | ICD-10-CM

## 2022-04-21 DIAGNOSIS — M65.4 TENDINITIS, DE QUERVAIN'S: ICD-10-CM

## 2022-04-21 DIAGNOSIS — E55.9 VITAMIN D DEFICIENCY: ICD-10-CM

## 2022-04-21 DIAGNOSIS — M79.7 FIBROMYALGIA: ICD-10-CM

## 2022-04-21 DIAGNOSIS — R63.5 WEIGHT GAIN: ICD-10-CM

## 2022-04-21 DIAGNOSIS — R20.2 NUMBNESS AND TINGLING IN LEFT HAND: ICD-10-CM

## 2022-04-21 DIAGNOSIS — F41.9 ANXIETY: ICD-10-CM

## 2022-04-21 DIAGNOSIS — E03.9 HYPOTHYROIDISM, UNSPECIFIED TYPE: ICD-10-CM

## 2022-04-21 DIAGNOSIS — R20.0 NUMBNESS AND TINGLING IN LEFT HAND: ICD-10-CM

## 2022-04-21 DIAGNOSIS — Z12.11 SCREENING FOR COLON CANCER: ICD-10-CM

## 2022-04-21 PROCEDURE — 82306 VITAMIN D 25 HYDROXY: CPT

## 2022-04-21 PROCEDURE — G8427 DOCREV CUR MEDS BY ELIG CLIN: HCPCS | Performed by: FAMILY MEDICINE

## 2022-04-21 PROCEDURE — 84439 ASSAY OF FREE THYROXINE: CPT

## 2022-04-21 PROCEDURE — 36415 COLL VENOUS BLD VENIPUNCTURE: CPT

## 2022-04-21 PROCEDURE — 84443 ASSAY THYROID STIM HORMONE: CPT

## 2022-04-21 PROCEDURE — G8417 CALC BMI ABV UP PARAM F/U: HCPCS | Performed by: FAMILY MEDICINE

## 2022-04-21 PROCEDURE — 99214 OFFICE O/P EST MOD 30 MIN: CPT | Performed by: FAMILY MEDICINE

## 2022-04-21 PROCEDURE — 3023F SPIROM DOC REV: CPT | Performed by: FAMILY MEDICINE

## 2022-04-21 PROCEDURE — 80053 COMPREHEN METABOLIC PANEL: CPT

## 2022-04-21 PROCEDURE — 1036F TOBACCO NON-USER: CPT | Performed by: FAMILY MEDICINE

## 2022-04-21 PROCEDURE — 85027 COMPLETE CBC AUTOMATED: CPT

## 2022-04-21 RX ORDER — CYANOCOBALAMIN 1000 UG/ML
1000 INJECTION INTRAMUSCULAR; SUBCUTANEOUS ONCE
Status: COMPLETED | OUTPATIENT
Start: 2022-04-21 | End: 2022-04-21

## 2022-04-21 RX ORDER — LEVOTHYROXINE SODIUM 112 UG/1
TABLET ORAL
Qty: 30 TABLET | Refills: 0 | Status: SHIPPED | OUTPATIENT
Start: 2022-04-21 | End: 2022-06-20

## 2022-04-21 RX ORDER — CLONAZEPAM 0.5 MG/1
TABLET ORAL
Qty: 45 TABLET | Refills: 2 | Status: SHIPPED | OUTPATIENT
Start: 2022-04-21 | End: 2022-07-21 | Stop reason: SDUPTHER

## 2022-04-21 RX ORDER — PHENTERMINE HYDROCHLORIDE 37.5 MG/1
TABLET ORAL
Qty: 30 TABLET | Refills: 2 | Status: SHIPPED | OUTPATIENT
Start: 2022-04-21 | End: 2022-05-22

## 2022-04-21 RX ORDER — CYCLOBENZAPRINE HCL 10 MG
TABLET ORAL
COMMUNITY
Start: 2022-03-21

## 2022-04-21 RX ADMIN — CYANOCOBALAMIN 1000 MCG: 1000 INJECTION INTRAMUSCULAR; SUBCUTANEOUS at 17:59

## 2022-04-21 ASSESSMENT — ENCOUNTER SYMPTOMS
NAUSEA: 0
COUGH: 0
TROUBLE SWALLOWING: 0
ABDOMINAL PAIN: 0
CHEST TIGHTNESS: 0
EYE REDNESS: 0
COLOR CHANGE: 0
EYE PAIN: 0
DIARRHEA: 0
VOMITING: 0
SHORTNESS OF BREATH: 0
SORE THROAT: 0
BACK PAIN: 1

## 2022-04-21 NOTE — LETTER
stamina and sex drive. For opiate medications, women who are of child bearing age 14-53 who could become pregnant should weigh the risks carefully with their physician as these medications make pregnancy more risky and the baby could be born sick and or addicted and have complications. For opiate medications, its possible to have dangerous interactions with alcohol and other medications. You may have an increase in pain called hyperalgesia, opioid medications can affect the brain and nerves that may cause increased pain and you may have trouble getting pain relief. To reduce the risks of harm to patients, we work to address lowering pain medication and improving your function. Dependence withdrawal symptoms may include; feelings of uneasiness, increased pain, irritability, belly pain, diarrhea, sweats and goose-flesh. 1. I understand that I have the following responsibilities:     I will take medications at the dose and frequency prescribed.  I will not increase or change how I take my medications without the approval of the health care provider who signs this Medication Agreement.  I will arrange for refills at the prescribed interval ONLY during regular office hours. I will not ask for refills earlier than agreed, after-hours, on holidays or on weekends.  I will obtain all refills for medications at [facility] , with full consent for my provider and pharmacist to exchange information in writing or verbally.  I will not request any pain medications or controlled substances from other providers and will inform this provider of all other medications I am taking.  I will inform my other health care providers that I am taking these medications and of the existence of this [id number]. In the event of an emergency, I will provide the same information to the emergency department providers.  I will protect my prescriptions and medications.  I understand that lost or misplaced prescriptions will not be replaced.  I will keep medications only for my own use and will not share them with others. I will keep all medications away from children     I agree to participate in any medical, psychological or psychiatric assessments recommended by my provider.  I will actively participate in any program designed to improve function, including social, physical, psychological and daily or work activities. 2. I will not use illegal or street drugs or another persons prescription. If I have an addiction problem with drugs or alcohol and my provider asks me to enter a program to address this issue, I agree to follow through. Such programs may include:      12-Step Program and securing a sponsor     Individual counseling     Inpatient or outpatient treatment     Other___________________________    If in treatment, I will request that a copy of the programs initial evaluation and treatment recommendations be sent to this provider and will not expect refills until that is received. I will also request written monthly updates be sent to this provider to verify my continuing treatment. 3. I will consent to random drug screening to assure I am only taking the prescribed drugs, described in this MEDICATION AGREEMENT. I understand that a drug screen is a laboratory test in which a sample of my urine or blood is checked to see what drugs I have been taking. 4. I will keep all my scheduled appointments. If I need to cancel my appointment I will do so, a minimum of 24 hours before it is scheduled. 5. I will consent to random medication (pill) counts that are deemed necessary by my provider as a result of suspicious/unpredictable behavior or inappropriate drug screen results. I understand if contacted for a random count it is my responsibility to bring all medications listed on this MEDICATION AGREEMENT to the providers office at time of count.      6. I understand that this provider may stop prescribing the medications listed if:     I do not show any improvement in pain or my activity has not improved     I develop rapid tolerance or loss of improvement as described in my treatment plan     I develop significant side effects from the medication     My behavior is inconsistent with the responsibilities outlined above, which may also result in being prevented from receiving further care from this office. AGREEMENT: I have read the above and have had all my questions answered. For chronic pain management, I know that chronic pain can be managed with many types of treatments. A chronic opioid trial may be part of my treatment but I must be an active participant in my care. Opioid medication is only one part of my pain management. There is limited scientific data to suggest that using opioids over 4 months will lower my pain and or improve my daily function. There is some scientific information that suggests using chronic opioids can increase my pain, make me feel less well, and increase my risk of unintentional death directly related to the opioid medication. I know that if my provider feels my risk from controlled medications and or opioid therapy is greater than my benefit, I will have my controlled substance medications and or opioid medication compassionately lowered or removed altogether. I agree to a controlled substance medication or chronic opioid trial.     I further agree to allow this office to contact family or friends if there are concerns about my safety and use of the controlled medications. I Marilee Oh have agreed to use the medications listed on this MEDICATION AGREEMENT as instructed by my physician and as stated in this Medication Agreement.        Patient Signature___________________________________________Date_________    Patient Printed Name_______________________________________      Provider Signature_________________________________________Date_________

## 2022-04-21 NOTE — PROGRESS NOTES
Chief Complaint   Patient presents with    COPD     f/u    Hypothyroidism       Have you seen any other physician or provider since your last visit yes - [facility] and pain [facility]    Have you had any other diagnostic tests since your last visit? yes - [facility]    Have you changed or stopped any medications since your last visit? yes - flexeril     Administrations This Visit     cyanocobalamin injection 1,000 mcg     Admin Date  04/21/2022  17:59 Action  Given Dose  1,000 mcg Route  IntraMUSCular Site  Deltoid Right Administered By  Ashly Fernandez MA    Ordering Provider: Liliane Mendoza MD    NDC: 65811-546-64    : Keegan Mendez    Patient Supplied?: No                Patient tolerated injection well. Patient advised to wait 20 minutes in the office following the injection. No signs/symptoms of reaction noted after 20 minutes.

## 2022-04-21 NOTE — PROGRESS NOTES
SUBJECTIVE:    Patient ID: Gilberto Johnson is a 41 y.o. female. Chief Complaint   Patient presents with    COPD     f/u    Hypothyroidism       HPI: office visit  She is in the office today in follow-up of her COPD. She seems to be doing well with her current regimen. Her medications seem to be doing well. She is following up about her hypothyroidism as well. She still struggling with some fatigue. She says she really struggles with trying to lose weight. She has not had any chest pain or increase in shortness of breath. She is still seeing pain management. Review of Systems   Constitutional: Negative for activity change, appetite change, chills and fever. HENT: Negative for congestion, ear pain, nosebleeds, sore throat and trouble swallowing. Eyes: Negative for pain and redness. Respiratory: Negative for cough, chest tightness and shortness of breath. Cardiovascular: Negative for chest pain, palpitations and leg swelling. Gastrointestinal: Negative for abdominal pain, diarrhea, nausea and vomiting. Genitourinary: Negative for difficulty urinating, dysuria and flank pain. Musculoskeletal: Positive for arthralgias and back pain. Negative for gait problem. Skin: Negative for color change, pallor, rash and wound. Allergic/Immunologic: Negative for environmental allergies and food allergies. Neurological: Negative for dizziness, numbness and headaches. Hematological: Negative for adenopathy. Does not bruise/bleed easily. Psychiatric/Behavioral: Negative for agitation and sleep disturbance. The patient is not nervous/anxious. All other systems reviewed and are negative.        OBJECTIVE:  /70   Pulse 113   Temp 97.7 °F (36.5 °C)   Resp 18   Ht 5' (1.524 m)   Wt 192 lb 9.6 oz (87.4 kg)   SpO2 95% Comment: ra  Breastfeeding No   BMI 37.61 kg/m²    Wt Readings from Last 3 Encounters:   04/21/22 192 lb 9.6 oz (87.4 kg)   01/25/22 195 lb (88.5 kg)   10/26/21 187 lb 3.2 oz (84.9 kg)     BP Readings from Last 3 Encounters:   04/21/22 126/70   01/25/22 138/68   10/26/21 130/80      Pulse Readings from Last 3 Encounters:   04/21/22 113   01/25/22 76   10/26/21 80     Body mass index is 37.61 kg/m². Resp Readings from Last 3 Encounters:   04/21/22 18   01/25/22 18   05/04/21 18     Past medical, surgical, family and social history were reviewed and updated with the patient. Physical Exam  Vitals and nursing note reviewed. Constitutional:       Appearance: Normal appearance. She is well-developed. HENT:      Head: Normocephalic and atraumatic. Right Ear: Hearing and external ear normal.      Left Ear: Hearing and external ear normal.      Nose: Nose normal.      Mouth/Throat:      Pharynx: Uvula midline. Eyes:      General: Lids are normal.      Conjunctiva/sclera: Conjunctivae normal.      Pupils: Pupils are equal, round, and reactive to light. Neck:      Thyroid: No thyroid mass or thyromegaly. Vascular: No carotid bruit. Trachea: Trachea and phonation normal.   Cardiovascular:      Rate and Rhythm: Normal rate and regular rhythm. Pulses: Normal pulses. Heart sounds: Normal heart sounds, S1 normal and S2 normal. No murmur heard. No friction rub. No gallop. Pulmonary:      Effort: Pulmonary effort is normal.      Breath sounds: Normal breath sounds. Genitourinary:     Exam position: Supine. Labia:         Right: No rash, tenderness, lesion or injury. Left: No rash, tenderness, lesion or injury. Vagina: No signs of injury and foreign body. No vaginal discharge, erythema, tenderness or bleeding. Rectum: Normal.      Comments: Status post hysterectomy    Musculoskeletal:      Cervical back: Normal range of motion and neck supple. Lumbar back: Spasms and tenderness present. Decreased range of motion. Right knee: Decreased range of motion. Tenderness present over the medial joint line.       Comments: Right si joint tenderness    Skin:     General: Skin is warm and dry. Neurological:      Mental Status: She is alert and oriented to person, place, and time. Psychiatric:         Attention and Perception: Attention and perception normal.         Mood and Affect: Affect normal. Mood is anxious. Speech: Speech normal.         Behavior: Behavior normal. Behavior is cooperative. Thought Content:  Thought content normal.         Cognition and Memory: Cognition and memory normal.         Judgment: Judgment normal.          Results in Past 30 Days  Result Component Current Result Ref Range Previous Result Ref Range   Albumin/Globulin Ratio 1.7 (4/21/2022) 0.8 - 2.0 Not in Time Range    Albumin 4.1 (4/21/2022) 3.4 - 4.8 g/dL Not in Time Range    Alkaline Phosphatase 129 (H) (4/21/2022) 25 - 100 U/L Not in Time Range    ALT <5 (4/21/2022) 4 - 36 U/L Not in Time Range    AST 11 (4/21/2022) 8 - 33 U/L Not in Time Range    BUN 14 (4/21/2022) 6 - 20 mg/dL Not in Time Range    Calcium 8.9 (4/21/2022) 8.5 - 10.5 mg/dL Not in Time Range    Chloride 103 (4/21/2022) 98 - 107 mmol/L Not in Time Range    CO2 28 (4/21/2022) 20 - 30 mmol/L Not in Time Range    CREATININE 0.8 (4/21/2022) 0.4 - 1.2 mg/dL Not in Time Range    GFR  >59 (4/21/2022) >59 Not in Time Range    GFR Non- >60 (4/21/2022) >59 Not in Time Range    Globulin 2.4 (4/21/2022) Not Established g/dL Not in Time Range    Glucose 83 (4/21/2022) 74 - 106 mg/dL Not in Time Range    Potassium 3.9 (4/21/2022) 3.4 - 5.1 mmol/L Not in Time Range    Sodium 140 (4/21/2022) 136 - 145 mmol/L Not in Time Range    Total Bilirubin 0.3 (4/21/2022) 0.3 - 1.2 mg/dL Not in Time Range    Total Protein 6.5 (4/21/2022) 6.4 - 8.3 g/dL Not in Time Range      Hemoglobin A1C (%)   Date Value   02/06/2020 5.4     LDL Calculated (mg/dL)   Date Value   01/25/2022 102       Lab Results   Component Value Date    WBC 8.5 04/21/2022    NEUTROABS 5.0 04/24/2017    HGB 9.8 04/21/2022    HCT 33.8 04/21/2022    MCV 81.8 04/21/2022     04/21/2022     Lab Results   Component Value Date    TSH 0.63 04/21/2022       ASSESSMENT/PLAN    Diagnosis Orders   1. Hypothyroidism, unspecified type doing pretty well with her current dose. We will recheck some levels to make sure. levothyroxine (SYNTHROID) 112 MCG tablet    TSH    T4, Free   2. Screening for colon cancer  Fecal DNA Colorectal cancer screening ([place])   3. Anxiety seems to be stable with her current medicine. clonazePAM (KLONOPIN) 0.5 MG tablet   4. Weight gain continues to be somewhat of an issue. She is trying to work on that. phentermine (ADIPEX-P) 37.5 MG tablet   5. Vitamin D deficiency  Vitamin D 25 Hydroxy   6. Fatigue, unspecified type  Comprehensive Metabolic Panel    CBC   7. Fibromyalgia stable at this point cyclobenzaprine (FLEXERIL) 10 MG tablet   8. Other emphysema ([address]     9. Numbness and tingling in left hand     10. Tendinitis, de Quervain's urged her to get a thumb spica splint. 11. B12 deficiency  cyanocobalamin injection 1,000 mcg       Orders Placed This Encounter   Medications    levothyroxine (SYNTHROID) 112 MCG tablet     Sig: TAKE ONE TABLET DAILY     Dispense:  30 tablet     Refill:  0    clonazePAM (KLONOPIN) 0.5 MG tablet     Sig: TAKE 1/2 TABLET EVERY MORNING AND 1 TABLET IN EVENING     Dispense:  45 tablet     Refill:  2    phentermine (ADIPEX-P) 37.5 MG tablet     Sig: Take one tablet daily     Dispense:  30 tablet     Refill:  2    cyanocobalamin injection 1,000 mcg        Medications Discontinued During This Encounter   Medication Reason    nystatin (MYCOSTATIN) 029402 UNIT/GM powder LIST CLEANUP    clonazePAM (KLONOPIN) 0.5 MG tablet REORDER    phentermine (ADIPEX-P) 37.5 MG tablet REORDER    levothyroxine (SYNTHROID) 112 MCG tablet REORDER       Controlled Substances Monitoring:      Please note: This chart was generated using Dragon dictation software.  Although every effort was made to ensure the accuracy of this automated transcription, some errors in transcription may have occurred.

## 2022-04-22 LAB
A/G RATIO: 1.7 (ref 0.8–2)
ALBUMIN SERPL-MCNC: 4.1 G/DL (ref 3.4–4.8)
ALP BLD-CCNC: 129 U/L (ref 25–100)
ALT SERPL-CCNC: <5 U/L (ref 4–36)
ANION GAP SERPL CALCULATED.3IONS-SCNC: 9 MMOL/L (ref 3–16)
AST SERPL-CCNC: 11 U/L (ref 8–33)
BILIRUB SERPL-MCNC: 0.3 MG/DL (ref 0.3–1.2)
BUN BLDV-MCNC: 14 MG/DL (ref 6–20)
CALCIUM SERPL-MCNC: 8.9 MG/DL (ref 8.5–10.5)
CHLORIDE BLD-SCNC: 103 MMOL/L (ref 98–107)
CO2: 28 MMOL/L (ref 20–30)
CREAT SERPL-MCNC: 0.8 MG/DL (ref 0.4–1.2)
GFR AFRICAN AMERICAN: >59
GFR NON-AFRICAN AMERICAN: >60
GLOBULIN: 2.4 G/DL
GLUCOSE BLD-MCNC: 83 MG/DL (ref 74–106)
HCT VFR BLD CALC: 33.8 % (ref 37–47)
HEMOGLOBIN: 9.8 G/DL (ref 11.5–16.5)
MCH RBC QN AUTO: 23.7 PG (ref 27–32)
MCHC RBC AUTO-ENTMCNC: 29 G/DL (ref 31–35)
MCV RBC AUTO: 81.8 FL (ref 80–100)
PDW BLD-RTO: 17.6 % (ref 11–16)
PLATELET # BLD: 339 K/UL (ref 150–400)
PMV BLD AUTO: 10.6 FL (ref 6–10)
POTASSIUM SERPL-SCNC: 3.9 MMOL/L (ref 3.4–5.1)
RBC # BLD: 4.13 M/UL (ref 3.8–5.8)
SODIUM BLD-SCNC: 140 MMOL/L (ref 136–145)
T4 FREE: 1.51 NG/DL (ref 0.89–1.76)
TOTAL PROTEIN: 6.5 G/DL (ref 6.4–8.3)
TSH SERPL DL<=0.05 MIU/L-ACNC: 0.63 UIU/ML (ref 0.27–4.2)
VITAMIN D 25-HYDROXY: 26.5 (ref 32–100)
WBC # BLD: 8.5 K/UL (ref 4–11)

## 2022-05-24 ENCOUNTER — OFFICE VISIT (OUTPATIENT)
Dept: FAMILY MEDICINE CLINIC | Age: 47
End: 2022-05-24
Payer: MEDICAID

## 2022-05-24 VITALS
DIASTOLIC BLOOD PRESSURE: 86 MMHG | BODY MASS INDEX: 34.81 KG/M2 | SYSTOLIC BLOOD PRESSURE: 132 MMHG | HEIGHT: 61 IN | OXYGEN SATURATION: 100 % | RESPIRATION RATE: 18 BRPM | WEIGHT: 184.4 LBS | HEART RATE: 101 BPM | TEMPERATURE: 97.2 F

## 2022-05-24 DIAGNOSIS — L98.9 SKIN LESION: ICD-10-CM

## 2022-05-24 DIAGNOSIS — J30.2 SEASONAL ALLERGIES: ICD-10-CM

## 2022-05-24 DIAGNOSIS — G89.29 CHRONIC PAIN OF LEFT KNEE: ICD-10-CM

## 2022-05-24 DIAGNOSIS — G89.29 CHRONIC RIGHT SHOULDER PAIN: ICD-10-CM

## 2022-05-24 DIAGNOSIS — M25.572 CHRONIC PAIN OF LEFT ANKLE: Primary | ICD-10-CM

## 2022-05-24 DIAGNOSIS — M54.41 CHRONIC BILATERAL LOW BACK PAIN WITH BILATERAL SCIATICA: ICD-10-CM

## 2022-05-24 DIAGNOSIS — G89.29 CHRONIC BILATERAL LOW BACK PAIN WITH BILATERAL SCIATICA: ICD-10-CM

## 2022-05-24 DIAGNOSIS — M54.42 CHRONIC BILATERAL LOW BACK PAIN WITH BILATERAL SCIATICA: ICD-10-CM

## 2022-05-24 DIAGNOSIS — G89.29 CHRONIC PAIN OF LEFT ANKLE: Primary | ICD-10-CM

## 2022-05-24 DIAGNOSIS — M25.562 CHRONIC PAIN OF LEFT KNEE: ICD-10-CM

## 2022-05-24 DIAGNOSIS — M25.511 CHRONIC RIGHT SHOULDER PAIN: ICD-10-CM

## 2022-05-24 PROCEDURE — 1036F TOBACCO NON-USER: CPT | Performed by: FAMILY MEDICINE

## 2022-05-24 PROCEDURE — G8417 CALC BMI ABV UP PARAM F/U: HCPCS | Performed by: FAMILY MEDICINE

## 2022-05-24 PROCEDURE — 99214 OFFICE O/P EST MOD 30 MIN: CPT | Performed by: FAMILY MEDICINE

## 2022-05-24 PROCEDURE — G8427 DOCREV CUR MEDS BY ELIG CLIN: HCPCS | Performed by: FAMILY MEDICINE

## 2022-05-24 RX ORDER — EPINEPHRINE 0.3 MG/.3ML
INJECTION INTRAMUSCULAR
Qty: 1 EACH | Refills: 3 | Status: SHIPPED | OUTPATIENT
Start: 2022-05-24

## 2022-05-24 RX ORDER — CEPHALEXIN 500 MG/1
500 CAPSULE ORAL 2 TIMES DAILY
Qty: 14 CAPSULE | Refills: 0 | Status: SHIPPED | OUTPATIENT
Start: 2022-05-24 | End: 2022-05-31

## 2022-05-24 ASSESSMENT — ENCOUNTER SYMPTOMS
CHEST TIGHTNESS: 0
NAUSEA: 0
VOMITING: 0
ABDOMINAL PAIN: 0
DIARRHEA: 0
COUGH: 0
SHORTNESS OF BREATH: 0
EYE PAIN: 0
BACK PAIN: 1
EYE REDNESS: 0
SORE THROAT: 0
TROUBLE SWALLOWING: 0
COLOR CHANGE: 0

## 2022-05-24 NOTE — PROGRESS NOTES
Chief Complaint   Patient presents with    Chronic Pain     fill out paper        Have you seen any other physician or provider since your last visit yes - [facility]    Have you had any other diagnostic tests since your last visit? no    Have you changed or stopped any medications since your last visit? no

## 2022-05-24 NOTE — PROGRESS NOTES
SUBJECTIVE:    Patient ID: Stalin Good is a 41 y.o. female. Chief Complaint   Patient presents with    Chronic Pain     fill out paper        HPI: office visit  She is in the office today in follow-up of her chronic pain. She sees pain management for her fibromyalgia, pain in her left knee, pain in her shoulder, DJD. She is needing some paperwork filled out about her medical history. She is continuing to see pain management. They continued her on medicine. She has not had any recent falls or injuries. Review of Systems   Constitutional: Negative for activity change, appetite change, chills and fever. HENT: Negative for congestion, ear pain, nosebleeds, sore throat and trouble swallowing. Eyes: Negative for pain and redness. Respiratory: Negative for cough, chest tightness and shortness of breath. Cardiovascular: Negative for chest pain, palpitations and leg swelling. Gastrointestinal: Negative for abdominal pain, diarrhea, nausea and vomiting. Genitourinary: Negative for difficulty urinating, dysuria and flank pain. Musculoskeletal: Positive for arthralgias and back pain. Negative for gait problem. Skin: Negative for color change, pallor, rash and wound. Allergic/Immunologic: Negative for environmental allergies and food allergies. Neurological: Negative for dizziness, numbness and headaches. Hematological: Negative for adenopathy. Does not bruise/bleed easily. Psychiatric/Behavioral: Negative for agitation and sleep disturbance. The patient is not nervous/anxious. All other systems reviewed and are negative.        OBJECTIVE:  /86   Pulse 101   Temp 97.2 °F (36.2 °C) (Infrared)   Resp 18   Ht 5' 1\" (1.549 m)   Wt 184 lb 6.4 oz (83.6 kg)   SpO2 100% Comment: RA  BMI 34.84 kg/m²    Wt Readings from Last 3 Encounters:   05/24/22 184 lb 6.4 oz (83.6 kg)   04/21/22 192 lb 9.6 oz (87.4 kg)   01/25/22 195 lb (88.5 kg)     BP Readings from Last 3 Encounters:   05/24/22 132/86   04/21/22 126/70   01/25/22 138/68      Pulse Readings from Last 3 Encounters:   05/24/22 101   04/21/22 113   01/25/22 76     Body mass index is 34.84 kg/m². Resp Readings from Last 3 Encounters:   05/24/22 18   04/21/22 18   01/25/22 18     Past medical, surgical, family and social history were reviewed and updated with the patient. Physical Exam  Vitals and nursing note reviewed. Constitutional:       Appearance: Normal appearance. She is well-developed. HENT:      Head: Normocephalic and atraumatic. Right Ear: Hearing and external ear normal.      Left Ear: Hearing and external ear normal.      Nose: Nose normal.      Mouth/Throat:      Pharynx: Uvula midline. Eyes:      General: Lids are normal.      Conjunctiva/sclera: Conjunctivae normal.      Pupils: Pupils are equal, round, and reactive to light. Neck:      Thyroid: No thyroid mass or thyromegaly. Vascular: No carotid bruit. Trachea: Trachea and phonation normal.   Cardiovascular:      Rate and Rhythm: Normal rate and regular rhythm. Pulses: Normal pulses. Heart sounds: Normal heart sounds, S1 normal and S2 normal. No murmur heard. No friction rub. No gallop. Pulmonary:      Effort: Pulmonary effort is normal.      Breath sounds: Normal breath sounds. Genitourinary:     Exam position: Supine. Labia:         Right: No rash, tenderness, lesion or injury. Left: No rash, tenderness, lesion or injury. Vagina: No signs of injury and foreign body. No vaginal discharge, erythema, tenderness or bleeding. Rectum: Normal.      Comments: Status post hysterectomy    Musculoskeletal:      Cervical back: Normal range of motion and neck supple. Lumbar back: Spasms and tenderness present. Decreased range of motion. Right knee: Decreased range of motion. Tenderness present over the medial joint line. Comments: Right si joint tenderness    Skin:     General: Skin is warm and dry. Neurological:      Mental Status: She is alert and oriented to person, place, and time. Psychiatric:         Attention and Perception: Attention and perception normal.         Mood and Affect: Affect normal. Mood is anxious. Speech: Speech normal.         Behavior: Behavior normal. Behavior is cooperative. Thought Content: Thought content normal.         Cognition and Memory: Cognition and memory normal.         Judgment: Judgment normal.          No results found for requested labs within last 30 days. Hemoglobin A1C (%)   Date Value   02/06/2020 5.4     LDL Calculated (mg/dL)   Date Value   01/25/2022 102       Lab Results   Component Value Date    WBC 8.5 04/21/2022    NEUTROABS 5.0 04/24/2017    HGB 9.8 04/21/2022    HCT 33.8 04/21/2022    MCV 81.8 04/21/2022     04/21/2022     Lab Results   Component Value Date    TSH 0.63 04/21/2022       ASSESSMENT/PLAN    Diagnosis Orders   1. Chronic pain of left ankle     2. Chronic pain of left knee     3. Chronic bilateral low back pain with bilateral sciatica she continues to see pain management. Medications do seem to be helping. Paperwork filled out for her. 4. Chronic right shoulder pain     5. Seasonal allergies EpiPen available in case she needs it. 6. Skin lesion breast area that does look like a small skin abrasion. Almost like a burn. We will go ahead and use some Silvadene and take a little antibiotic to see if we can make it go away.   If not she will have to have a diagnostic mammo release and ultrasound        Orders Placed This Encounter   Medications    EPIPEN 2-TALITA 0.3 MG/0.3ML SOAJ injection     Sig: Inject 1 pen as directed as needed     Dispense:  1 each     Refill:  3    silver sulfADIAZINE (SILVADENE) 1 % cream     Sig: Apply topically twice a day     Dispense:  50 g     Refill:  0    cephALEXin (KEFLEX) 500 MG capsule     Sig: Take 1 capsule by mouth 2 times daily for 7 days     Dispense:  14 capsule Refill:  0        Medications Discontinued During This Encounter   Medication Reason    EPIPEN 2-TALITA 0.3 MG/0.3ML SOAJ injection REORDER       Controlled Substances Monitoring:      Please note: This chart was generated using Dragon dictation software. Although every effort was made to ensure the accuracy of this automated transcription, some errors in transcription may have occurred.

## 2022-06-20 DIAGNOSIS — E03.9 HYPOTHYROIDISM, UNSPECIFIED TYPE: ICD-10-CM

## 2022-06-20 RX ORDER — LEVOTHYROXINE SODIUM 112 UG/1
TABLET ORAL
Qty: 30 TABLET | Refills: 0 | Status: SHIPPED | OUTPATIENT
Start: 2022-06-20 | End: 2022-07-21 | Stop reason: SDUPTHER

## 2022-06-20 RX ORDER — FLUTICASONE PROPIONATE 50 MCG
SPRAY, SUSPENSION (ML) NASAL
Qty: 16 G | Refills: 0 | Status: SHIPPED | OUTPATIENT
Start: 2022-06-20 | End: 2022-07-25

## 2022-07-21 ENCOUNTER — HOSPITAL ENCOUNTER (OUTPATIENT)
Facility: HOSPITAL | Age: 47
Discharge: HOME | End: 2022-07-21
Payer: MEDICAID

## 2022-07-21 ENCOUNTER — OFFICE VISIT (OUTPATIENT)
Dept: FAMILY MEDICINE CLINIC | Age: 47
End: 2022-07-21
Payer: MEDICAID

## 2022-07-21 VITALS
OXYGEN SATURATION: 100 % | TEMPERATURE: 97.9 F | HEIGHT: 61 IN | HEART RATE: 70 BPM | SYSTOLIC BLOOD PRESSURE: 130 MMHG | DIASTOLIC BLOOD PRESSURE: 80 MMHG | BODY MASS INDEX: 34.17 KG/M2 | RESPIRATION RATE: 12 BRPM | WEIGHT: 181 LBS

## 2022-07-21 DIAGNOSIS — G89.29 CHRONIC PAIN OF LEFT KNEE: ICD-10-CM

## 2022-07-21 DIAGNOSIS — L98.9 SKIN LESION: ICD-10-CM

## 2022-07-21 DIAGNOSIS — E03.9 HYPOTHYROIDISM, UNSPECIFIED TYPE: ICD-10-CM

## 2022-07-21 DIAGNOSIS — M25.562 CHRONIC PAIN OF LEFT KNEE: ICD-10-CM

## 2022-07-21 DIAGNOSIS — R63.5 WEIGHT GAIN: ICD-10-CM

## 2022-07-21 DIAGNOSIS — R53.83 FATIGUE, UNSPECIFIED TYPE: ICD-10-CM

## 2022-07-21 DIAGNOSIS — E03.9 HYPOTHYROIDISM, UNSPECIFIED TYPE: Primary | ICD-10-CM

## 2022-07-21 DIAGNOSIS — M25.552 LEFT HIP PAIN: ICD-10-CM

## 2022-07-21 DIAGNOSIS — F41.9 ANXIETY: ICD-10-CM

## 2022-07-21 PROCEDURE — 99214 OFFICE O/P EST MOD 30 MIN: CPT | Performed by: FAMILY MEDICINE

## 2022-07-21 PROCEDURE — 85027 COMPLETE CBC AUTOMATED: CPT

## 2022-07-21 PROCEDURE — 84443 ASSAY THYROID STIM HORMONE: CPT

## 2022-07-21 PROCEDURE — 80053 COMPREHEN METABOLIC PANEL: CPT

## 2022-07-21 RX ORDER — CYANOCOBALAMIN 1000 UG/ML
1000 INJECTION INTRAMUSCULAR; SUBCUTANEOUS ONCE
Status: SHIPPED | OUTPATIENT
Start: 2022-07-21

## 2022-07-21 RX ORDER — PHENTERMINE HYDROCHLORIDE 37.5 MG/1
37.5 TABLET ORAL
Qty: 30 TABLET | Refills: 2 | Status: SHIPPED | OUTPATIENT
Start: 2022-07-21 | End: 2022-10-18 | Stop reason: SDUPTHER

## 2022-07-21 RX ORDER — LEVOTHYROXINE SODIUM 112 UG/1
TABLET ORAL
Qty: 30 TABLET | Refills: 5 | Status: SHIPPED | OUTPATIENT
Start: 2022-07-21

## 2022-07-21 RX ORDER — PHENTERMINE HYDROCHLORIDE 37.5 MG/1
37.5 TABLET ORAL DAILY
COMMUNITY
Start: 2022-07-01 | End: 2022-07-21 | Stop reason: SDUPTHER

## 2022-07-21 RX ORDER — CLONAZEPAM 0.5 MG/1
TABLET ORAL
Qty: 45 TABLET | Refills: 2 | Status: SHIPPED | OUTPATIENT
Start: 2022-07-21 | End: 2022-10-18 | Stop reason: SDUPTHER

## 2022-07-21 ASSESSMENT — ENCOUNTER SYMPTOMS
ABDOMINAL PAIN: 0
SHORTNESS OF BREATH: 0
EYE REDNESS: 0
NAUSEA: 0
TROUBLE SWALLOWING: 0
COLOR CHANGE: 0
EYE PAIN: 0
DIARRHEA: 0
VOMITING: 0
SORE THROAT: 0
CHEST TIGHTNESS: 0
BACK PAIN: 1
COUGH: 0

## 2022-07-21 NOTE — PROGRESS NOTES
SUBJECTIVE:    Patient ID: Hank Paredes is a 41 y.o. female. Chief Complaint   Patient presents with    Hypothyroidism    Knee Pain       HPI: office visit  Office today in follow-up of her hypothyroidism. She is feeling like she is a little more fatigued than usual.  There is been a lot of stress at home. They are having some issues with her stepson particularly. She is having quite a bit of knee and hip pain. On the left side she is really struggling. She continues to have a lot of back pain and sees pain management for that. She is had an x-ray of her knee but not her hip. She says she is having some popping in her hip when she tries to walk. She has not had any recent falls or injuries. She is denying any chest pain or increasing shortness of breath. She does not have any obvious medication problems. Place that she had on her right breast does now seem to be thick with some irregular appearance to the skin. She like to see dermatology for follow-up on that. We will schedule her an appointment. Review of Systems   Constitutional:  Negative for activity change, appetite change, chills and fever. HENT:  Negative for congestion, ear pain, nosebleeds, sore throat and trouble swallowing. Eyes:  Negative for pain and redness. Respiratory:  Negative for cough, chest tightness and shortness of breath. Cardiovascular:  Negative for chest pain, palpitations and leg swelling. Gastrointestinal:  Negative for abdominal pain, diarrhea, nausea and vomiting. Genitourinary:  Negative for difficulty urinating, dysuria and flank pain. Musculoskeletal:  Positive for arthralgias and back pain. Negative for gait problem. Skin:  Negative for color change, pallor, rash and wound. Allergic/Immunologic: Negative for environmental allergies and food allergies. Neurological:  Negative for dizziness, numbness and headaches. Hematological:  Negative for adenopathy. Does not bruise/bleed easily. Psychiatric/Behavioral:  Negative for agitation and sleep disturbance. The patient is not nervous/anxious. All other systems reviewed and are negative. OBJECTIVE:  /80 (Site: Right Upper Arm, Position: Sitting, Cuff Size: Large Adult)   Pulse 70   Temp 97.9 °F (36.6 °C) (Temporal)   Resp 12   Ht 5' 1\" (1.549 m)   Wt 181 lb (82.1 kg)   SpO2 100%   BMI 34.20 kg/m²    Wt Readings from Last 3 Encounters:   07/21/22 181 lb (82.1 kg)   05/24/22 184 lb 6.4 oz (83.6 kg)   04/21/22 192 lb 9.6 oz (87.4 kg)     BP Readings from Last 3 Encounters:   07/21/22 130/80   05/24/22 132/86   04/21/22 126/70      Pulse Readings from Last 3 Encounters:   07/21/22 70   05/24/22 101   04/21/22 113     Body mass index is 34.2 kg/m². Resp Readings from Last 3 Encounters:   07/21/22 12   05/24/22 18   04/21/22 18     Past medical, surgical, family and social history were reviewed and updated with the patient. Physical Exam  Vitals and nursing note reviewed. Constitutional:       Appearance: Normal appearance. She is well-developed. HENT:      Head: Normocephalic and atraumatic. Right Ear: Hearing and external ear normal.      Left Ear: Hearing and external ear normal.      Nose: Nose normal.      Mouth/Throat:      Pharynx: Uvula midline. Eyes:      General: Lids are normal.      Conjunctiva/sclera: Conjunctivae normal.      Pupils: Pupils are equal, round, and reactive to light. Neck:      Thyroid: No thyroid mass or thyromegaly. Vascular: No carotid bruit. Trachea: Trachea and phonation normal.   Cardiovascular:      Rate and Rhythm: Normal rate and regular rhythm. Pulses: Normal pulses. Heart sounds: Normal heart sounds, S1 normal and S2 normal. No murmur heard. No friction rub. No gallop. Pulmonary:      Effort: Pulmonary effort is normal.      Breath sounds: Normal breath sounds. Genitourinary:     Exam position: Supine.       Labia:         Right: No rash, tenderness, lesion or injury. Left: No rash, tenderness, lesion or injury. Vagina: No signs of injury and foreign body. No vaginal discharge, erythema, tenderness or bleeding. Rectum: Normal.      Comments: Status post hysterectomy    Musculoskeletal:      Cervical back: Normal range of motion and neck supple. Lumbar back: Spasms and tenderness present. Decreased range of motion. Left hip: Crepitus present. Decreased range of motion. Decreased strength. Left knee: Decreased range of motion. Tenderness present over the medial joint line. Comments: Left si joint tenderness    Skin:     General: Skin is warm and dry. Neurological:      Mental Status: She is alert and oriented to person, place, and time. Psychiatric:         Attention and Perception: Attention and perception normal.         Mood and Affect: Affect normal. Mood is anxious. Speech: Speech normal.         Behavior: Behavior normal. Behavior is cooperative. Thought Content: Thought content normal.         Cognition and Memory: Cognition and memory normal.         Judgment: Judgment normal.        No results found for requested labs within last 30 days. Hemoglobin A1C (%)   Date Value   02/06/2020 5.4     LDL Calculated (mg/dL)   Date Value   01/25/2022 102       Lab Results   Component Value Date/Time    WBC 8.5 04/21/2022 05:52 PM    NEUTROABS 5.0 04/24/2017 04:24 PM    HGB 9.8 04/21/2022 05:52 PM    HCT 33.8 04/21/2022 05:52 PM    MCV 81.8 04/21/2022 05:52 PM     04/21/2022 05:52 PM     Lab Results   Component Value Date    TSH 0.63 04/21/2022       ASSESSMENT/PLAN    Diagnosis Orders   1. Hypothyroidism, unspecified type  TSH    levothyroxine (SYNTHROID) 112 MCG tablet      2. Fatigue, unspecified type  CBC    Comprehensive Metabolic Panel      3. Anxiety  clonazePAM (KLONOPIN) 0.5 MG tablet      4. Weight gain  phentermine (ADIPEX-P) 37.5 MG tablet      5.  Skin lesion  External Referral To

## 2022-07-21 NOTE — TELEPHONE ENCOUNTER
Refills received from pharmacy today pending for approval. Patient needs to attend their scheduled appointment later today

## 2022-07-21 NOTE — PROGRESS NOTES
Chief Complaint   Patient presents with    Hypothyroidism    Knee Pain       Have you seen any other physician or provider since your last visit yes - pain clinic    Have you had any other diagnostic tests since your last visit? no    Have you changed or stopped any medications since your last visit? no     I have recommended that this patient have a mammogram but she due to refusal reason: no symptoms  I have discussed the risks and benefits of this examination with her. The patient verbalizes understanding. Provider will be informed of refusal.      Diabetic retinal exam completed this year?  Yes                       * If yes please have patient sign a records release to obtain record to update Health Maintenance                       * If no, please order referral for patient to be scheduled     Patient here for 3 month follow up

## 2022-07-22 LAB
A/G RATIO: 1.9 (ref 0.8–2)
ALBUMIN SERPL-MCNC: 4.7 G/DL (ref 3.4–4.8)
ALP BLD-CCNC: 118 U/L (ref 25–100)
ALT SERPL-CCNC: <5 U/L (ref 4–36)
ANION GAP SERPL CALCULATED.3IONS-SCNC: 11 MMOL/L (ref 3–16)
AST SERPL-CCNC: 15 U/L (ref 8–33)
BILIRUB SERPL-MCNC: 0.8 MG/DL (ref 0.3–1.2)
BUN BLDV-MCNC: 14 MG/DL (ref 6–20)
CALCIUM SERPL-MCNC: 9.8 MG/DL (ref 8.5–10.5)
CHLORIDE BLD-SCNC: 97 MMOL/L (ref 98–107)
CO2: 26 MMOL/L (ref 20–30)
CREAT SERPL-MCNC: 1.1 MG/DL (ref 0.4–1.2)
GFR AFRICAN AMERICAN: >59
GFR NON-AFRICAN AMERICAN: 54
GLOBULIN: 2.5 G/DL
GLUCOSE BLD-MCNC: 83 MG/DL (ref 74–106)
HCT VFR BLD CALC: 36 % (ref 37–47)
HEMOGLOBIN: 11 G/DL (ref 11.5–16.5)
MCH RBC QN AUTO: 24.7 PG (ref 27–32)
MCHC RBC AUTO-ENTMCNC: 30.6 G/DL (ref 31–35)
MCV RBC AUTO: 80.7 FL (ref 80–100)
PDW BLD-RTO: 17.4 % (ref 11–16)
PLATELET # BLD: 382 K/UL (ref 150–400)
PMV BLD AUTO: 10.6 FL (ref 6–10)
POTASSIUM SERPL-SCNC: 4.4 MMOL/L (ref 3.4–5.1)
RBC # BLD: 4.46 M/UL (ref 3.8–5.8)
SODIUM BLD-SCNC: 134 MMOL/L (ref 136–145)
TOTAL PROTEIN: 7.2 G/DL (ref 6.4–8.3)
TSH SERPL DL<=0.05 MIU/L-ACNC: 0.13 UIU/ML (ref 0.27–4.2)
WBC # BLD: 11.3 K/UL (ref 4–11)

## 2022-07-25 RX ORDER — FLUTICASONE PROPIONATE 50 MCG
SPRAY, SUSPENSION (ML) NASAL
Qty: 16 G | Refills: 5 | Status: SHIPPED | OUTPATIENT
Start: 2022-07-25

## 2022-07-25 RX ORDER — LEVOTHYROXINE SODIUM 112 UG/1
TABLET ORAL
Qty: 30 TABLET | Refills: 0 | OUTPATIENT
Start: 2022-07-25

## 2022-10-18 ENCOUNTER — OFFICE VISIT (OUTPATIENT)
Dept: FAMILY MEDICINE CLINIC | Age: 47
End: 2022-10-18
Payer: MEDICAID

## 2022-10-18 VITALS
SYSTOLIC BLOOD PRESSURE: 112 MMHG | OXYGEN SATURATION: 98 % | RESPIRATION RATE: 18 BRPM | WEIGHT: 183.8 LBS | DIASTOLIC BLOOD PRESSURE: 78 MMHG | TEMPERATURE: 97.5 F | HEIGHT: 61 IN | BODY MASS INDEX: 34.7 KG/M2 | HEART RATE: 118 BPM

## 2022-10-18 DIAGNOSIS — R53.83 FATIGUE, UNSPECIFIED TYPE: ICD-10-CM

## 2022-10-18 DIAGNOSIS — J43.8 OTHER EMPHYSEMA (HCC): ICD-10-CM

## 2022-10-18 DIAGNOSIS — F41.9 ANXIETY: ICD-10-CM

## 2022-10-18 DIAGNOSIS — E03.9 HYPOTHYROIDISM, UNSPECIFIED TYPE: ICD-10-CM

## 2022-10-18 DIAGNOSIS — M79.641 RIGHT HAND PAIN: Primary | ICD-10-CM

## 2022-10-18 DIAGNOSIS — R63.5 WEIGHT GAIN: ICD-10-CM

## 2022-10-18 PROCEDURE — 1036F TOBACCO NON-USER: CPT | Performed by: FAMILY MEDICINE

## 2022-10-18 PROCEDURE — 99214 OFFICE O/P EST MOD 30 MIN: CPT | Performed by: FAMILY MEDICINE

## 2022-10-18 PROCEDURE — G8417 CALC BMI ABV UP PARAM F/U: HCPCS | Performed by: FAMILY MEDICINE

## 2022-10-18 PROCEDURE — G8484 FLU IMMUNIZE NO ADMIN: HCPCS | Performed by: FAMILY MEDICINE

## 2022-10-18 PROCEDURE — G8427 DOCREV CUR MEDS BY ELIG CLIN: HCPCS | Performed by: FAMILY MEDICINE

## 2022-10-18 PROCEDURE — 3023F SPIROM DOC REV: CPT | Performed by: FAMILY MEDICINE

## 2022-10-18 RX ORDER — FAMOTIDINE 40 MG/1
TABLET, FILM COATED ORAL
Qty: 180 TABLET | Refills: 3 | Status: SHIPPED | OUTPATIENT
Start: 2022-10-18

## 2022-10-18 RX ORDER — DEXLANSOPRAZOLE 60 MG/1
CAPSULE, DELAYED RELEASE ORAL
Qty: 90 CAPSULE | Refills: 3 | Status: SHIPPED | OUTPATIENT
Start: 2022-10-18

## 2022-10-18 RX ORDER — MONTELUKAST SODIUM 10 MG/1
TABLET ORAL
Qty: 90 TABLET | Refills: 3 | Status: SHIPPED | OUTPATIENT
Start: 2022-10-18

## 2022-10-18 RX ORDER — CLONAZEPAM 0.5 MG/1
TABLET ORAL
Qty: 45 TABLET | Refills: 2 | Status: SHIPPED | OUTPATIENT
Start: 2022-10-18 | End: 2023-01-17

## 2022-10-18 RX ORDER — BUPROPION HYDROCHLORIDE 300 MG/1
TABLET ORAL
Qty: 90 TABLET | Refills: 3 | Status: SHIPPED | OUTPATIENT
Start: 2022-10-18

## 2022-10-18 RX ORDER — FUROSEMIDE 20 MG/1
TABLET ORAL
Qty: 90 TABLET | Refills: 3 | Status: SHIPPED | OUTPATIENT
Start: 2022-10-18

## 2022-10-18 RX ORDER — NAPROXEN 500 MG/1
500 TABLET ORAL 2 TIMES DAILY
Qty: 180 TABLET | Refills: 3 | Status: CANCELLED | OUTPATIENT
Start: 2022-10-18

## 2022-10-18 RX ORDER — LEVOCETIRIZINE DIHYDROCHLORIDE 5 MG/1
TABLET, FILM COATED ORAL
Qty: 90 TABLET | Refills: 3 | Status: SHIPPED | OUTPATIENT
Start: 2022-10-18

## 2022-10-18 RX ORDER — PHENTERMINE HYDROCHLORIDE 37.5 MG/1
37.5 TABLET ORAL
Qty: 30 TABLET | Refills: 2 | Status: SHIPPED | OUTPATIENT
Start: 2022-10-18 | End: 2022-11-17

## 2022-10-18 ASSESSMENT — ENCOUNTER SYMPTOMS
CHEST TIGHTNESS: 0
DIARRHEA: 0
VOMITING: 0
EYE PAIN: 0
BACK PAIN: 1
SORE THROAT: 0
TROUBLE SWALLOWING: 0
NAUSEA: 0
COLOR CHANGE: 0
EYE REDNESS: 0
ABDOMINAL PAIN: 0
COUGH: 0
SHORTNESS OF BREATH: 0

## 2022-10-18 NOTE — PROGRESS NOTES
Chief Complaint   Patient presents with    Hypothyroidism     Reg f/u       Have you seen any other physician or provider since your last visit yes - derm, pain center    Have you had any other diagnostic tests since your last visit?  yes - bx of breast    Have you changed or stopped any medications since your last visit? no

## 2022-10-18 NOTE — PROGRESS NOTES
SUBJECTIVE:    Patient ID: Holli Reis is a 41 y.o. female. Chief Complaint   Patient presents with    Hypothyroidism     Reg f/u       HPI: office visit  She is in the office today in follow-up of her hypothyroidism. She is still struggling with her right hand hurting. She says she still having a lot of pain. She says it nothing that they have done is made it any better. She is very frustrated with her weight gain. She says she is trying to be more active. She is trying to decrease the calorie intake. She says her anxiety is a little worse recently because her 's been hurt. She is not had any recent falls or injuries. She does still see pain management. She has not had any significant changes in her medicine. She does still complain of some fatigue. She still having some shortness of breath. She does continue to smoke. Review of Systems   Constitutional:  Negative for activity change, appetite change, chills and fever. HENT:  Negative for congestion, ear pain, nosebleeds, sore throat and trouble swallowing. Eyes:  Negative for pain and redness. Respiratory:  Negative for cough, chest tightness and shortness of breath. Cardiovascular:  Negative for chest pain, palpitations and leg swelling. Gastrointestinal:  Negative for abdominal pain, diarrhea, nausea and vomiting. Genitourinary:  Negative for difficulty urinating, dysuria and flank pain. Musculoskeletal:  Positive for arthralgias and back pain. Negative for gait problem. Skin:  Negative for color change, pallor, rash and wound. Allergic/Immunologic: Negative for environmental allergies and food allergies. Neurological:  Negative for dizziness, numbness and headaches. Hematological:  Negative for adenopathy. Does not bruise/bleed easily. Psychiatric/Behavioral:  Negative for agitation and sleep disturbance. The patient is not nervous/anxious. All other systems reviewed and are negative.      OBJECTIVE:  BP hysterectomy    Musculoskeletal:      Cervical back: Normal range of motion and neck supple. Lumbar back: Spasms and tenderness present. Decreased range of motion. Left hip: Crepitus present. Decreased range of motion. Decreased strength. Left knee: Decreased range of motion. Tenderness present over the medial joint line. Comments: Left si joint tenderness    Skin:     General: Skin is warm and dry. Neurological:      Mental Status: She is alert and oriented to person, place, and time. Psychiatric:         Attention and Perception: Attention and perception normal.         Mood and Affect: Affect normal. Mood is anxious. Speech: Speech normal.         Behavior: Behavior normal. Behavior is cooperative. Thought Content: Thought content normal.         Cognition and Memory: Cognition and memory normal.         Judgment: Judgment normal.        No results found for requested labs within last 30 days. Hemoglobin A1C (%)   Date Value   02/06/2020 5.4     LDL Calculated (mg/dL)   Date Value   01/25/2022 102       Lab Results   Component Value Date/Time    WBC 11.3 07/21/2022 06:20 PM    NEUTROABS 5.0 04/24/2017 04:24 PM    HGB 11.0 07/21/2022 06:20 PM    HCT 36.0 07/21/2022 06:20 PM    MCV 80.7 07/21/2022 06:20 PM     07/21/2022 06:20 PM     Lab Results   Component Value Date    TSH 0.13 (L) 07/21/2022       ASSESSMENT/PLAN    Diagnosis Orders   1. Anxiety  clonazePAM (KLONOPIN) 0.5 MG tablet      2.  Weight gain  phentermine (ADIPEX-P) 37.5 MG tablet          Orders Placed This Encounter   Medications    clonazePAM (KLONOPIN) 0.5 MG tablet     Sig: TAKE 1/2 TABLET EVERY MORNING AND 1 TABLET IN EVENING     Dispense:  45 tablet     Refill:  2    DEXILANT 60 MG CPDR delayed release capsule     Sig: TAKE ONE CAPSULE DAILY     Dispense:  90 capsule     Refill:  3    buPROPion (WELLBUTRIN XL) 300 MG extended release tablet     Sig: TAKE ONE TABLET EVERY MORNING     Dispense:  90 tablet     Refill:  3    furosemide (LASIX) 20 MG tablet     Sig: TAKE ONE TABLET BY MOUTH EVERY DAY     Dispense:  90 tablet     Refill:  3    famotidine (PEPCID) 40 MG tablet     Sig: Take one tablet daily     Dispense:  180 tablet     Refill:  3    levocetirizine (XYZAL) 5 MG tablet     Sig: TAKE ONE TABLET DAILY     Dispense:  90 tablet     Refill:  3    montelukast (SINGULAIR) 10 MG tablet     Sig: TAKE ONE TABLET BY MOUTH NIGHTLY     Dispense:  90 tablet     Refill:  3    phentermine (ADIPEX-P) 37.5 MG tablet     Sig: Take 1 tablet by mouth every morning (before breakfast) for 30 days. Dispense:  30 tablet     Refill:  2        Medications Discontinued During This Encounter   Medication Reason    DEXILANT 60 MG CPDR delayed release capsule REORDER    buPROPion (WELLBUTRIN XL) 300 MG extended release tablet REORDER    furosemide (LASIX) 20 MG tablet REORDER    famotidine (PEPCID) 40 MG tablet REORDER    levocetirizine (XYZAL) 5 MG tablet REORDER    montelukast (SINGULAIR) 10 MG tablet REORDER    clonazePAM (KLONOPIN) 0.5 MG tablet REORDER    phentermine (ADIPEX-P) 37.5 MG tablet REORDER       Controlled Substances Monitoring:      Please note: This chart was generated using Dragon dictation software. Although every effort was made to ensure the accuracy of this automated transcription, some errors in transcription may have occurred.

## 2022-10-20 ENCOUNTER — HOSPITAL ENCOUNTER (OUTPATIENT)
Dept: GENERAL RADIOLOGY | Facility: HOSPITAL | Age: 47
Discharge: HOME | End: 2022-10-20
Payer: MEDICAID

## 2022-10-20 ENCOUNTER — HOSPITAL ENCOUNTER (OUTPATIENT)
Facility: HOSPITAL | Age: 47
Discharge: HOME | End: 2022-10-20
Payer: MEDICAID

## 2022-10-20 DIAGNOSIS — M25.552 LEFT HIP PAIN: ICD-10-CM

## 2022-10-20 PROCEDURE — 73502 X-RAY EXAM HIP UNI 2-3 VIEWS: CPT

## 2022-11-17 ENCOUNTER — OFFICE VISIT (OUTPATIENT)
Dept: FAMILY MEDICINE CLINIC | Age: 47
End: 2022-11-17
Payer: MEDICAID

## 2022-11-17 VITALS
DIASTOLIC BLOOD PRESSURE: 70 MMHG | WEIGHT: 187 LBS | SYSTOLIC BLOOD PRESSURE: 138 MMHG | HEIGHT: 61 IN | RESPIRATION RATE: 18 BRPM | OXYGEN SATURATION: 97 % | BODY MASS INDEX: 35.3 KG/M2 | HEART RATE: 78 BPM

## 2022-11-17 DIAGNOSIS — J30.2 SEASONAL ALLERGIES: ICD-10-CM

## 2022-11-17 DIAGNOSIS — M79.7 FIBROMYALGIA: ICD-10-CM

## 2022-11-17 DIAGNOSIS — F41.9 ANXIETY: ICD-10-CM

## 2022-11-17 DIAGNOSIS — M79.641 RIGHT HAND PAIN: Primary | ICD-10-CM

## 2022-11-17 DIAGNOSIS — E03.9 HYPOTHYROIDISM, UNSPECIFIED TYPE: ICD-10-CM

## 2022-11-17 PROCEDURE — G8427 DOCREV CUR MEDS BY ELIG CLIN: HCPCS | Performed by: FAMILY MEDICINE

## 2022-11-17 PROCEDURE — 99214 OFFICE O/P EST MOD 30 MIN: CPT | Performed by: FAMILY MEDICINE

## 2022-11-17 PROCEDURE — G8417 CALC BMI ABV UP PARAM F/U: HCPCS | Performed by: FAMILY MEDICINE

## 2022-11-17 PROCEDURE — 1036F TOBACCO NON-USER: CPT | Performed by: FAMILY MEDICINE

## 2022-11-17 PROCEDURE — G8484 FLU IMMUNIZE NO ADMIN: HCPCS | Performed by: FAMILY MEDICINE

## 2022-11-17 RX ORDER — FLUTICASONE PROPIONATE 50 MCG
SPRAY, SUSPENSION (ML) NASAL
Qty: 16 G | Refills: 5 | Status: SHIPPED | OUTPATIENT
Start: 2022-11-17

## 2022-11-17 RX ORDER — LEVOTHYROXINE SODIUM 112 UG/1
TABLET ORAL
Qty: 30 TABLET | Refills: 5 | Status: SHIPPED | OUTPATIENT
Start: 2022-11-17

## 2022-11-17 RX ORDER — NAPROXEN 500 MG/1
500 TABLET ORAL 2 TIMES DAILY
Qty: 180 TABLET | Refills: 3 | Status: CANCELLED | OUTPATIENT
Start: 2022-11-17

## 2022-11-17 ASSESSMENT — ENCOUNTER SYMPTOMS
COLOR CHANGE: 0
TROUBLE SWALLOWING: 0
BACK PAIN: 1
NAUSEA: 0
CHEST TIGHTNESS: 0
SHORTNESS OF BREATH: 0
SORE THROAT: 0
COUGH: 0
VOMITING: 0
EYE REDNESS: 0
DIARRHEA: 0
ABDOMINAL PAIN: 0
EYE PAIN: 0

## 2022-11-17 NOTE — PROGRESS NOTES
Chief Complaint   Patient presents with    Hand Pain     Right hand pain       Have you seen any other physician or provider since your last visit yes - [facility]    Have you had any other diagnostic tests since your last visit? no    Have you changed or stopped any medications since your last visit? no

## 2022-11-17 NOTE — PROGRESS NOTES
SUBJECTIVE:    Patient ID: Kristi Penny is a 41 y.o. female. Chief Complaint   Patient presents with    Hand Pain     Right hand pain       HPI: office visit  She is still having pain in her right hand. She has not gotten any resolution to the symptoms at this point. She is not heard back about an appointment. She is not having any chest pain shortness of breath or other issues. She does not feel like she is getting as much relief out of her medicine. She is concerned about what to do. She is still seeing pain management about her back. She has not had any medication problems. Review of Systems   Constitutional:  Negative for activity change, appetite change, chills and fever. HENT:  Negative for congestion, ear pain, nosebleeds, sore throat and trouble swallowing. Eyes:  Negative for pain and redness. Respiratory:  Negative for cough, chest tightness and shortness of breath. Cardiovascular:  Negative for chest pain, palpitations and leg swelling. Gastrointestinal:  Negative for abdominal pain, diarrhea, nausea and vomiting. Genitourinary:  Negative for difficulty urinating, dysuria and flank pain. Musculoskeletal:  Positive for arthralgias and back pain. Negative for gait problem. Skin:  Negative for color change, pallor, rash and wound. Allergic/Immunologic: Negative for environmental allergies and food allergies. Neurological:  Negative for dizziness, numbness and headaches. Hematological:  Negative for adenopathy. Does not bruise/bleed easily. Psychiatric/Behavioral:  Negative for agitation and sleep disturbance. The patient is not nervous/anxious. All other systems reviewed and are negative.      OBJECTIVE:  /70   Pulse 78   Resp 18   Ht 5' 1\" (1.549 m)   Wt 187 lb (84.8 kg)   SpO2 97% Comment: ra  BMI 35.33 kg/m²    Wt Readings from Last 3 Encounters:   11/17/22 187 lb (84.8 kg)   10/18/22 183 lb 12.8 oz (83.4 kg)   07/21/22 181 lb (82.1 kg)     BP Readings from Last 3 Encounters:   11/17/22 138/70   10/18/22 112/78   07/21/22 130/80      Pulse Readings from Last 3 Encounters:   11/17/22 78   10/18/22 (!) 118   07/21/22 70     Body mass index is 35.33 kg/m². Resp Readings from Last 3 Encounters:   11/17/22 18   10/18/22 18   07/21/22 12     Past medical, surgical, family and social history were reviewed and updated with the patient. Physical Exam  Vitals and nursing note reviewed. Constitutional:       Appearance: Normal appearance. She is well-developed. HENT:      Head: Normocephalic and atraumatic. Right Ear: Hearing and external ear normal.      Left Ear: Hearing and external ear normal.      Nose: Nose normal.      Mouth/Throat:      Pharynx: Uvula midline. Eyes:      General: Lids are normal.      Conjunctiva/sclera: Conjunctivae normal.      Pupils: Pupils are equal, round, and reactive to light. Neck:      Thyroid: No thyroid mass or thyromegaly. Vascular: No carotid bruit. Trachea: Trachea and phonation normal.   Cardiovascular:      Rate and Rhythm: Normal rate and regular rhythm. Pulses: Normal pulses. Heart sounds: Normal heart sounds, S1 normal and S2 normal. No murmur heard. No friction rub. No gallop. Pulmonary:      Effort: Pulmonary effort is normal.      Breath sounds: Normal breath sounds. Genitourinary:     Exam position: Supine. Labia:         Right: No rash, tenderness, lesion or injury. Left: No rash, tenderness, lesion or injury. Vagina: No signs of injury and foreign body. No vaginal discharge, erythema, tenderness or bleeding. Rectum: Normal.      Comments: Status post hysterectomy    Musculoskeletal:      Cervical back: Normal range of motion and neck supple. Lumbar back: Spasms and tenderness present. Decreased range of motion. Left hip: Crepitus present. Decreased range of motion. Decreased strength. Left knee: Decreased range of motion.  Tenderness present over the medial joint line. Comments: Left si joint tenderness    Skin:     General: Skin is warm and dry. Neurological:      Mental Status: She is alert and oriented to person, place, and time. Psychiatric:         Attention and Perception: Attention and perception normal.         Mood and Affect: Affect normal. Mood is anxious. Speech: Speech normal.         Behavior: Behavior normal. Behavior is cooperative. Thought Content: Thought content normal.         Cognition and Memory: Cognition and memory normal.         Judgment: Judgment normal.        No results found for requested labs within last 30 days. Hemoglobin A1C (%)   Date Value   02/06/2020 5.4     LDL Calculated (mg/dL)   Date Value   01/25/2022 102       Lab Results   Component Value Date/Time    WBC 11.3 07/21/2022 06:20 PM    NEUTROABS 5.0 04/24/2017 04:24 PM    HGB 11.0 07/21/2022 06:20 PM    HCT 36.0 07/21/2022 06:20 PM    MCV 80.7 07/21/2022 06:20 PM     07/21/2022 06:20 PM     Lab Results   Component Value Date    TSH 0.13 (L) 07/21/2022       ASSESSMENT/PLAN    Diagnosis Orders   1. Right hand pain  Awaiting callback from the surgeon's office about her appointment      2. Hypothyroidism, unspecified type  levothyroxine (SYNTHROID) 112 MCG tablet      3. Anxiety  Stable with her current medicine      4. Fibromyalgia  Seems to be doing pretty well with her current regimen.       5. Seasonal allergies  fluticasone (FLONASE) 50 MCG/ACT nasal spray          Orders Placed This Encounter   Medications    levothyroxine (SYNTHROID) 112 MCG tablet     Sig: Take one tablet daily     Dispense:  30 tablet     Refill:  5    fluticasone (FLONASE) 50 MCG/ACT nasal spray     Sig: USE 1 SPRAY IN EACH NOSTRIL DAILY     Dispense:  16 g     Refill:  5        Medications Discontinued During This Encounter   Medication Reason    fluticasone (FLONASE) 50 MCG/ACT nasal spray REORDER    levothyroxine (SYNTHROID) 112 MCG tablet REORDER Controlled Substances Monitoring:      Please note: This chart was generated using Dragon dictation software. Although every effort was made to ensure the accuracy of this automated transcription, some errors in transcription may have occurred.

## 2023-01-19 DIAGNOSIS — R63.5 WEIGHT GAIN: ICD-10-CM

## 2023-01-19 RX ORDER — PHENTERMINE HYDROCHLORIDE 37.5 MG/1
TABLET ORAL
Qty: 30 TABLET | Refills: 2 | Status: SHIPPED | OUTPATIENT
Start: 2023-01-19 | End: 2023-04-19

## 2023-01-19 NOTE — TELEPHONE ENCOUNTER
Refill request received from pharmacy      Next Office Visit Date:  Future Appointments   Date Time Provider Petr Young   2/21/2023  3:15 PM Sunday MD [address] - Please review via [address]

## 2023-01-30 RX ORDER — NAPROXEN 500 MG/1
TABLET ORAL
Qty: 180 TABLET | Refills: 3 | Status: SHIPPED | OUTPATIENT
Start: 2023-01-30

## 2023-02-15 DIAGNOSIS — F41.9 ANXIETY: ICD-10-CM

## 2023-02-16 RX ORDER — CLONAZEPAM 0.5 MG/1
TABLET ORAL
Qty: 45 TABLET | Refills: 2 | Status: SHIPPED | OUTPATIENT
Start: 2023-02-16 | End: 2023-05-16

## 2023-02-16 NOTE — TELEPHONE ENCOUNTER
Refill request received from pharmacy      Next Office Visit Date:  Future Appointments   Date Time Provider Petr Young   2/21/2023  3:15 PM Jose Kohler MD [address] - Please review via [address]

## 2023-02-21 ENCOUNTER — OFFICE VISIT (OUTPATIENT)
Dept: FAMILY MEDICINE CLINIC | Age: 48
End: 2023-02-21

## 2023-02-21 ENCOUNTER — HOSPITAL ENCOUNTER (OUTPATIENT)
Facility: HOSPITAL | Age: 48
Discharge: HOME | End: 2023-02-21
Payer: MEDICAID

## 2023-02-21 VITALS
RESPIRATION RATE: 18 BRPM | OXYGEN SATURATION: 99 % | SYSTOLIC BLOOD PRESSURE: 124 MMHG | DIASTOLIC BLOOD PRESSURE: 78 MMHG | TEMPERATURE: 97.2 F | HEIGHT: 61 IN | WEIGHT: 186.2 LBS | HEART RATE: 78 BPM | BODY MASS INDEX: 35.16 KG/M2

## 2023-02-21 DIAGNOSIS — R53.83 FATIGUE, UNSPECIFIED TYPE: ICD-10-CM

## 2023-02-21 DIAGNOSIS — Z12.11 SCREENING FOR COLON CANCER: ICD-10-CM

## 2023-02-21 DIAGNOSIS — E53.8 B12 DEFICIENCY: ICD-10-CM

## 2023-02-21 DIAGNOSIS — Z13.220 SCREENING, LIPID: ICD-10-CM

## 2023-02-21 DIAGNOSIS — F41.9 ANXIETY: ICD-10-CM

## 2023-02-21 DIAGNOSIS — R63.5 WEIGHT GAIN: ICD-10-CM

## 2023-02-21 DIAGNOSIS — E03.9 HYPOTHYROIDISM, UNSPECIFIED TYPE: ICD-10-CM

## 2023-02-21 DIAGNOSIS — E03.9 HYPOTHYROIDISM, UNSPECIFIED TYPE: Primary | ICD-10-CM

## 2023-02-21 DIAGNOSIS — M79.641 RIGHT HAND PAIN: ICD-10-CM

## 2023-02-21 DIAGNOSIS — M79.7 FIBROMYALGIA: ICD-10-CM

## 2023-02-21 PROCEDURE — 80061 LIPID PANEL: CPT

## 2023-02-21 PROCEDURE — 80053 COMPREHEN METABOLIC PANEL: CPT

## 2023-02-21 PROCEDURE — 85027 COMPLETE CBC AUTOMATED: CPT

## 2023-02-21 PROCEDURE — 84439 ASSAY OF FREE THYROXINE: CPT

## 2023-02-21 PROCEDURE — 84443 ASSAY THYROID STIM HORMONE: CPT

## 2023-02-21 RX ORDER — CYANOCOBALAMIN 1000 UG/ML
1000 INJECTION, SOLUTION INTRAMUSCULAR; SUBCUTANEOUS ONCE
Status: COMPLETED | OUTPATIENT
Start: 2023-02-21 | End: 2023-02-21

## 2023-02-21 RX ORDER — MIRTAZAPINE 30 MG/1
30 TABLET, FILM COATED ORAL NIGHTLY
Qty: 30 TABLET | Refills: 2 | Status: SHIPPED | OUTPATIENT
Start: 2023-02-21

## 2023-02-21 RX ADMIN — CYANOCOBALAMIN 1000 MCG: 1000 INJECTION, SOLUTION INTRAMUSCULAR; SUBCUTANEOUS at 15:58

## 2023-02-21 ASSESSMENT — PATIENT HEALTH QUESTIONNAIRE - PHQ9
1. LITTLE INTEREST OR PLEASURE IN DOING THINGS: 1
8. MOVING OR SPEAKING SO SLOWLY THAT OTHER PEOPLE COULD HAVE NOTICED. OR THE OPPOSITE, BEING SO FIGETY OR RESTLESS THAT YOU HAVE BEEN MOVING AROUND A LOT MORE THAN USUAL: 1
4. FEELING TIRED OR HAVING LITTLE ENERGY: 1
SUM OF ALL RESPONSES TO PHQ9 QUESTIONS 1 & 2: 2
3. TROUBLE FALLING OR STAYING ASLEEP: 2
SUM OF ALL RESPONSES TO PHQ QUESTIONS 1-9: 9
7. TROUBLE CONCENTRATING ON THINGS, SUCH AS READING THE NEWSPAPER OR WATCHING TELEVISION: 1
5. POOR APPETITE OR OVEREATING: 1
6. FEELING BAD ABOUT YOURSELF - OR THAT YOU ARE A FAILURE OR HAVE LET YOURSELF OR YOUR FAMILY DOWN: 1
9. THOUGHTS THAT YOU WOULD BE BETTER OFF DEAD, OR OF HURTING YOURSELF: 0
SUM OF ALL RESPONSES TO PHQ QUESTIONS 1-9: 9
2. FEELING DOWN, DEPRESSED OR HOPELESS: 1

## 2023-02-21 ASSESSMENT — ENCOUNTER SYMPTOMS
VOMITING: 0
COLOR CHANGE: 0
COUGH: 0
BACK PAIN: 1
SHORTNESS OF BREATH: 0
ABDOMINAL PAIN: 0
DIARRHEA: 0
TROUBLE SWALLOWING: 0
SORE THROAT: 0
CHEST TIGHTNESS: 0
NAUSEA: 0
EYE REDNESS: 0
EYE PAIN: 0

## 2023-02-21 NOTE — PROGRESS NOTES
Chief Complaint   Patient presents with    COPD     F/U       Have you seen any other physician or provider since your last visit yes - Pain Management     Have you had any other diagnostic tests since your last visit? no    Have you changed or stopped any medications since your last visit? no     Administrations This Visit       cyanocobalamin injection 1,000 mcg       Admin Date  02/21/2023  15:58 Action  Given Dose  1,000 mcg Route  IntraMUSCular Site  Deltoid Left Administered By  Isha Lopez MA    Ordering Provider: Merary Marte MD    [address] [facility] [address]: [id number]    : [employer] [facility]    Patient Supplied?: No                    Patient tolerated injection well. Patient advised to wait 20 minutes in the office following the injection. No signs/symptoms of reaction noted after 20 minutes.

## 2023-02-21 NOTE — PROGRESS NOTES
SUBJECTIVE:    Patient ID: Yuliya Laws is a 57 y.o. female. Chief Complaint   Patient presents with    COPD     F/U       HPI: office visit  She is in the office today in follow-up of her hypothyroidism. She says she feels like she is having some more problems with her thyroid again. She is having more fatigue. She is having difficulty with her weight. She has not had any chest pain. She denies any shortness of breath. She is stressed over some family issues. She has not had any recent falls or injuries. She continues to see pain management about her back pain. Review of Systems   Constitutional:  Negative for activity change, appetite change, chills and fever. HENT:  Negative for congestion, ear pain, nosebleeds, sore throat and trouble swallowing. Eyes:  Negative for pain and redness. Respiratory:  Negative for cough, chest tightness and shortness of breath. Cardiovascular:  Negative for chest pain, palpitations and leg swelling. Gastrointestinal:  Negative for abdominal pain, diarrhea, nausea and vomiting. Genitourinary:  Negative for difficulty urinating, dysuria and flank pain. Musculoskeletal:  Positive for arthralgias and back pain. Negative for gait problem. Skin:  Negative for color change, pallor, rash and wound. Allergic/Immunologic: Negative for environmental allergies and food allergies. Neurological:  Negative for dizziness, numbness and headaches. Hematological:  Negative for adenopathy. Does not bruise/bleed easily. Psychiatric/Behavioral:  Negative for agitation and sleep disturbance. The patient is not nervous/anxious. All other systems reviewed and are negative.      OBJECTIVE:  /78   Pulse 78   Temp 97.2 °F (36.2 °C) (Infrared)   Resp 18   Ht 5' 1\" (1.549 m)   Wt 186 lb 3.2 oz (84.5 kg)   SpO2 99%   BMI 35.18 kg/m²    Wt Readings from Last 3 Encounters:   02/21/23 186 lb 3.2 oz (84.5 kg)   11/17/22 187 lb (84.8 kg)   10/18/22 183 lb 12.8 oz (83.4 kg)     BP Readings from Last 3 Encounters:   02/21/23 124/78   11/17/22 138/70   10/18/22 112/78      Pulse Readings from Last 3 Encounters:   02/21/23 78   11/17/22 78   10/18/22 (!) 118     Body mass index is 35.18 kg/m². Resp Readings from Last 3 Encounters:   02/21/23 18   11/17/22 18   10/18/22 18     Past medical, surgical, family and social history were reviewed and updated with the patient. Physical Exam  Vitals and nursing note reviewed. Constitutional:       Appearance: Normal appearance. She is well-developed. HENT:      Head: Normocephalic and atraumatic. Right Ear: Hearing and external ear normal.      Left Ear: Hearing and external ear normal.      Nose: Nose normal.      Mouth/Throat:      Pharynx: Uvula midline. Eyes:      General: Lids are normal.      Conjunctiva/sclera: Conjunctivae normal.      Pupils: Pupils are equal, round, and reactive to light. Neck:      Thyroid: No thyroid mass or thyromegaly. Vascular: No carotid bruit. Trachea: Trachea and phonation normal.   Cardiovascular:      Rate and Rhythm: Normal rate and regular rhythm. Pulses: Normal pulses. Heart sounds: Normal heart sounds, S1 normal and S2 normal. No murmur heard. No friction rub. No gallop. Pulmonary:      Effort: Pulmonary effort is normal.      Breath sounds: Normal breath sounds. Genitourinary:     Exam position: Supine. Labia:         Right: No rash, tenderness, lesion or injury. Left: No rash, tenderness, lesion or injury. Vagina: No signs of injury and foreign body. No vaginal discharge, erythema, tenderness or bleeding. Rectum: Normal.      Comments: Status post hysterectomy    Musculoskeletal:      Cervical back: Normal range of motion and neck supple. Lumbar back: Spasms and tenderness present. Decreased range of motion. Left hip: Crepitus present. Decreased range of motion. Decreased strength.       Left knee: Decreased range of motion. Tenderness present over the medial joint line. Comments: Left si joint tenderness    Skin:     General: Skin is warm and dry. Neurological:      Mental Status: She is alert and oriented to person, place, and time. Psychiatric:         Attention and Perception: Attention and perception normal.         Mood and Affect: Affect normal. Mood is anxious. Speech: Speech normal.         Behavior: Behavior normal. Behavior is cooperative. Thought Content:  Thought content normal.         Cognition and Memory: Cognition and memory normal.         Judgment: Judgment normal.        Results in Past 30 Days  Result Component Current Result Ref Range Previous Result Ref Range   Albumin/Globulin Ratio 1.8 (2/21/2023) 0.8 - 2.0 Not in Time Range    Albumin 4.2 (2/21/2023) 3.4 - 4.8 g/dL Not in Time Range    Alkaline Phosphatase 131 (H) (2/21/2023) 25 - 100 U/L Not in Time Range    ALT 6 (2/21/2023) 4 - 36 U/L Not in Time Range    AST 15 (2/21/2023) 8 - 33 U/L Not in Time Range    BUN 10 (2/21/2023) 6 - 20 mg/dL Not in Time Range    Calcium 8.9 (2/21/2023) 8.5 - 10.5 mg/dL Not in Time Range    Chloride 102 (2/21/2023) 98 - 107 mmol/L Not in Time Range    CO2 26 (2/21/2023) 20 - 30 mmol/L Not in Time Range    Creatinine 0.9 (2/21/2023) 0.4 - 1.2 mg/dL Not in Time Range    Est, Glom Filt Rate >60 (2/21/2023) >59 Not in Time Range    Globulin 2.4 (2/21/2023) Not Established g/dL Not in Time Range    Glucose 75 (2/21/2023) 74 - 106 mg/dL Not in Time Range    Potassium 4.3 (2/21/2023) 3.4 - 5.1 mmol/L Not in Time Range    Sodium 139 (2/21/2023) 136 - 145 mmol/L Not in Time Range    Total Bilirubin <0.2 (L) (2/21/2023) 0.3 - 1.2 mg/dL Not in Time Range    Total Protein 6.6 (2/21/2023) 6.4 - 8.3 g/dL Not in Time Range      Hemoglobin A1C (%)   Date Value   02/06/2020 5.4     LDL Calculated (mg/dL)   Date Value   02/21/2023 113       Lab Results   Component Value Date/Time    WBC 5.4 02/21/2023 04:00 PM NEUTROABS 5.0 04/24/2017 04:24 PM    HGB 10.2 02/21/2023 04:00 PM    HCT 34.4 02/21/2023 04:00 PM    MCV 84.9 02/21/2023 04:00 PM     02/21/2023 04:00 PM     Lab Results   Component Value Date    TSH 0.78 02/21/2023       ASSESSMENT/PLAN    Diagnosis Orders   1. Hypothyroidism, unspecified type  TSH    T4, Free      2. Screening for colon cancer  Fecal DNA Colorectal cancer screening (ColApprenNet[facility])      3. Right hand pain  External Referral To Orthopedic Surgery      4. Fibromyalgia        5. Anxiety        6. Screening, lipid  LIPID PANEL      7. Fatigue, unspecified type  CBC    Comprehensive Metabolic Panel      8. B12 deficiency  cyanocobalamin injection 1,000 mcg      9. Weight gain            Orders Placed This Encounter   Medications    mirtazapine (REMERON) 30 MG tablet     Sig: Take 1 tablet by mouth nightly     Dispense:  30 tablet     Refill:  2    cyanocobalamin injection 1,000 mcg          There are no discontinued medications. Controlled Substances Monitoring:      Please note: This chart was generated using Dragon dictation software. Although every effort was made to ensure the accuracy of this automated transcription, some errors in transcription may have occurred.

## 2023-02-22 LAB
A/G RATIO: 1.8 (ref 0.8–2)
ALBUMIN SERPL-MCNC: 4.2 G/DL (ref 3.4–4.8)
ALP BLD-CCNC: 131 U/L (ref 25–100)
ALT SERPL-CCNC: 6 U/L (ref 4–36)
ANION GAP SERPL CALCULATED.3IONS-SCNC: 11 MMOL/L (ref 3–16)
AST SERPL-CCNC: 15 U/L (ref 8–33)
BILIRUB SERPL-MCNC: <0.2 MG/DL (ref 0.3–1.2)
BUN BLDV-MCNC: 10 MG/DL (ref 6–20)
CALCIUM SERPL-MCNC: 8.9 MG/DL (ref 8.5–10.5)
CHLORIDE BLD-SCNC: 102 MMOL/L (ref 98–107)
CHOLESTEROL, TOTAL: 213 MG/DL (ref 0–200)
CO2: 26 MMOL/L (ref 20–30)
CREAT SERPL-MCNC: 0.9 MG/DL (ref 0.4–1.2)
GFR SERPL CREATININE-BSD FRML MDRD: >60 ML/MIN/{1.73_M2}
GLOBULIN: 2.4 G/DL
GLUCOSE BLD-MCNC: 75 MG/DL (ref 74–106)
HCT VFR BLD CALC: 34.4 % (ref 37–47)
HDLC SERPL-MCNC: 65 MG/DL (ref 40–60)
HEMOGLOBIN: 10.2 G/DL (ref 11.5–16.5)
LDL CHOLESTEROL CALCULATED: 113 MG/DL
MCH RBC QN AUTO: 25.2 PG (ref 27–32)
MCHC RBC AUTO-ENTMCNC: 29.7 G/DL (ref 31–35)
MCV RBC AUTO: 84.9 FL (ref 80–100)
PDW BLD-RTO: 17.2 % (ref 11–16)
PLATELET # BLD: 264 K/UL (ref 150–400)
PMV BLD AUTO: 10.5 FL (ref 6–10)
POTASSIUM SERPL-SCNC: 4.3 MMOL/L (ref 3.4–5.1)
RBC # BLD: 4.05 M/UL (ref 3.8–5.8)
SODIUM BLD-SCNC: 139 MMOL/L (ref 136–145)
T4 FREE: 1.68 NG/DL (ref 0.89–1.76)
TOTAL PROTEIN: 6.6 G/DL (ref 6.4–8.3)
TRIGL SERPL-MCNC: 173 MG/DL (ref 0–249)
TSH SERPL DL<=0.05 MIU/L-ACNC: 0.78 UIU/ML (ref 0.27–4.2)
VLDLC SERPL CALC-MCNC: 35 MG/DL
WBC # BLD: 5.4 K/UL (ref 4–11)

## 2023-02-28 ENCOUNTER — OFFICE VISIT (OUTPATIENT)
Dept: FAMILY MEDICINE CLINIC | Age: 48
End: 2023-02-28

## 2023-02-28 VITALS
OXYGEN SATURATION: 99 % | HEIGHT: 60 IN | BODY MASS INDEX: 36.52 KG/M2 | SYSTOLIC BLOOD PRESSURE: 140 MMHG | TEMPERATURE: 97.5 F | WEIGHT: 186 LBS | HEART RATE: 102 BPM | DIASTOLIC BLOOD PRESSURE: 80 MMHG

## 2023-02-28 DIAGNOSIS — J44.1 COPD EXACERBATION (HCC): Primary | ICD-10-CM

## 2023-02-28 RX ORDER — IPRATROPIUM BROMIDE AND ALBUTEROL SULFATE 2.5; .5 MG/3ML; MG/3ML
1 SOLUTION RESPIRATORY (INHALATION) EVERY 4 HOURS PRN
Qty: 540 ML | Refills: 0 | Status: SHIPPED | OUTPATIENT
Start: 2023-02-28 | End: 2023-03-30

## 2023-02-28 RX ORDER — FLUCONAZOLE 150 MG/1
150 TABLET ORAL ONCE
Qty: 1 TABLET | Refills: 0 | Status: SHIPPED | OUTPATIENT
Start: 2023-02-28 | End: 2023-02-28

## 2023-02-28 RX ORDER — ALBUTEROL SULFATE 90 UG/1
2 AEROSOL, METERED RESPIRATORY (INHALATION) 4 TIMES DAILY PRN
Qty: 18 G | Refills: 0 | Status: SHIPPED | OUTPATIENT
Start: 2023-02-28

## 2023-02-28 RX ORDER — DOXYCYCLINE HYCLATE 100 MG
100 TABLET ORAL 2 TIMES DAILY
Qty: 20 TABLET | Refills: 0 | Status: SHIPPED | OUTPATIENT
Start: 2023-02-28 | End: 2023-03-10

## 2023-02-28 RX ORDER — PREDNISONE 20 MG/1
20 TABLET ORAL 2 TIMES DAILY
Qty: 10 TABLET | Refills: 0 | Status: SHIPPED | OUTPATIENT
Start: 2023-02-28 | End: 2023-03-05

## 2023-02-28 ASSESSMENT — ENCOUNTER SYMPTOMS
CHEST TIGHTNESS: 1
WHEEZING: 1
SHORTNESS OF BREATH: 1
COUGH: 1

## 2023-02-28 NOTE — PROGRESS NOTES
Liz Haywood 48 y.o. presents today for   Chief Complaint   Patient presents with    Congestion     48 y.o female presented here today with c/o chest congestion and productive cough x 3 days.  Pt declined POCT        HPI:  Liz Haywood states 3 days ago she began wheezing, SOB with a dry cough. No fever. She quit smoking in .    History reviewed. No pertinent family history.     Social History     Socioeconomic History    Marital status: Single     Spouse name: Not on file    Number of children: Not on file    Years of education: Not on file    Highest education level: Not on file   Occupational History    Not on file   Tobacco Use    Smoking status: Former     Types: Cigarettes     Quit date: 2011     Years since quittin.0    Smokeless tobacco: Never   Substance and Sexual Activity    Alcohol use: No    Drug use: No    Sexual activity: Not on file   Other Topics Concern    Not on file   Social History Narrative    Not on file     Social Determinants of Health     Financial Resource Strain: Not on file   Food Insecurity: Not on file   Transportation Needs: Not on file   Physical Activity: Not on file   Stress: Not on file   Social Connections: Not on file   Intimate Partner Violence: Not on file   Housing Stability: Not on file        Past Surgical History:   Procedure Laterality Date    APPENDECTOMY      CHOLECYSTECTOMY      HEMORRHOID SURGERY      HYSTERECTOMY (CERVIX STATUS UNKNOWN)          Past Medical History:   Diagnosis Date    Allergic rhinitis     Anemia     Cancer (HCC)     cervical    Osteoarthritis         Current Outpatient Medications   Medication Sig Dispense Refill    doxycycline hyclate (VIBRA-TABS) 100 MG tablet Take 1 tablet by mouth 2 times daily for 10 days 20 tablet 0    predniSONE (DELTASONE) 20 MG tablet Take 1 tablet by mouth 2 times daily for 5 days 10 tablet 0    ipratropium-albuterol (DUONEB) 0.5-2.5 (3) MG/3ML SOLN nebulizer solution Inhale 3 mLs into the lungs  every 4 hours as needed for Shortness of Breath 540 mL 0    albuterol sulfate HFA (VENTOLIN HFA) 108 (90 Base) MCG/ACT inhaler Inhale 2 puffs into the lungs 4 times daily as needed for Wheezing 18 g 0    fluconazole (DIFLUCAN) 150 MG tablet Take 1 tablet by mouth once for 1 dose 1 tablet 0    mirtazapine (REMERON) 30 MG tablet Take 1 tablet by mouth nightly 30 tablet 2    clonazePAM (KLONOPIN) 0.5 MG tablet TAKE 1/2 TABLET EVERY MORNING AND 1 TABLET IN EVENING 45 tablet 2    naproxen (NAPROSYN) 500 MG tablet TAKE ONE TABLET 2 TIMES A  tablet 3    phentermine (ADIPEX-P) 37.5 MG tablet TAKE ONE TABLET EVERY MORNING BEFORE BREAKFAST 30 tablet 2    levothyroxine (SYNTHROID) 112 MCG tablet Take one tablet daily 30 tablet 5    fluticasone (FLONASE) 50 MCG/ACT nasal spray USE 1 SPRAY IN EACH NOSTRIL DAILY 16 g 5    DEXILANT 60 MG CPDR delayed release capsule TAKE ONE CAPSULE DAILY 90 capsule 3    buPROPion (WELLBUTRIN XL) 300 MG extended release tablet TAKE ONE TABLET EVERY MORNING 90 tablet 3    furosemide (LASIX) 20 MG tablet TAKE ONE TABLET BY MOUTH EVERY DAY 90 tablet 3    famotidine (PEPCID) 40 MG tablet Take one tablet daily 180 tablet 3    levocetirizine (XYZAL) 5 MG tablet TAKE ONE TABLET DAILY 90 tablet 3    montelukast (SINGULAIR) 10 MG tablet TAKE ONE TABLET BY MOUTH NIGHTLY 90 tablet 3    EPIPEN 2-TALITA 0.3 MG/0.3ML SOAJ injection Inject 1 pen as directed as needed 1 each 3    silver sulfADIAZINE (SILVADENE) 1 % cream Apply topically twice a day 50 g 0    cyclobenzaprine (FLEXERIL) 10 MG tablet       diclofenac sodium (VOLTAREN) 1 % GEL APPLY 4 GRAMS FOUR TIMES A  g 0    morphine sulfate ER (MS CONTIN) 15 MG CR tablet Take 1 tablet by mouth nightly      oxyCODONE-acetaminophen (PERCOCET) 7.5-325 MG per tablet Take 1 tablet by mouth every 8 hours as needed      gabapentin (NEURONTIN) 800 MG tablet Take 800 mg by mouth 4 times daily.  Pain management      albuterol (PROVENTIL;VENTOLIN) 90 MCG/ACT inhaler Inhale 2 puffs into the lungs every 6 hours as needed       Current Facility-Administered Medications   Medication Dose Route Frequency Provider Last Rate Last Admin    cyanocobalamin injection 1,000 mcg  1,000 mcg IntraMUSCular Once Malik Jimenez MD            Review of Systems   Respiratory:  Positive for cough, chest tightness, shortness of breath and wheezing. All other systems reviewed and are negative. BP (!) 140/80   Pulse (!) 102   Temp 97.5 °F (36.4 °C) (Temporal)   Ht 5' (1.524 m)   Wt 186 lb (84.4 kg)   SpO2 99%   BMI 36.33 kg/m²      Physical Exam  Vitals and nursing note reviewed. Constitutional:       Appearance: Normal appearance. HENT:      Head: Normocephalic and atraumatic. Right Ear: Tympanic membrane, ear canal and external ear normal.      Left Ear: Tympanic membrane, ear canal and external ear normal.      Nose: Nose normal.      Mouth/Throat:      Mouth: Mucous membranes are moist.      Pharynx: Oropharynx is clear. Eyes:      Extraocular Movements: Extraocular movements intact. Conjunctiva/sclera: Conjunctivae normal.      Pupils: Pupils are equal, round, and reactive to light. Cardiovascular:      Rate and Rhythm: Normal rate and regular rhythm. Pulses: Normal pulses. Heart sounds: Normal heart sounds. Pulmonary:      Effort: Pulmonary effort is normal.      Breath sounds: Wheezing and rhonchi present. Abdominal:      General: Bowel sounds are normal.      Palpations: Abdomen is soft. Musculoskeletal:         General: Normal range of motion. Cervical back: Normal range of motion and neck supple. Skin:     General: Skin is warm and dry. Capillary Refill: Capillary refill takes less than 2 seconds. Neurological:      General: No focal deficit present. Mental Status: She is alert and oriented to person, place, and time.    Psychiatric:         Mood and Affect: Mood normal.         Behavior: Behavior normal. ASSESSMENT/PLAN    1. COPD exacerbation ([facility] [address]  Provided pt with nebulizer machine. Advised her to take medication as directed and to use nebulizer machine as directed. She should f/u if s/s persist or worsen. She is agreeable to poc.   - doxycycline hyclate (VIBRA-TABS) 100 MG tablet; Take 1 tablet by mouth 2 times daily for 10 days  Dispense: 20 tablet; Refill: 0  - predniSONE (DELTASONE) 20 MG tablet; Take 1 tablet by mouth 2 times daily for 5 days  Dispense: 10 tablet; Refill: 0  - ipratropium-albuterol (DUONEB) 0.5-2.5 (3) MG/3ML SOLN nebulizer solution; Inhale 3 mLs into the lungs every 4 hours as needed for Shortness of Breath  Dispense: 540 mL; Refill: 0  - albuterol sulfate HFA (VENTOLIN HFA) 108 (90 Base) MCG/ACT inhaler; Inhale 2 puffs into the lungs 4 times daily as needed for Wheezing  Dispense: 18 g; Refill: 0  - fluconazole (DIFLUCAN) 150 MG tablet; Take 1 tablet by mouth once for 1 dose  Dispense: 1 tablet;  Refill: 0           YAIMA Martinez - CNP

## 2023-05-01 RX ORDER — PHENTERMINE HYDROCHLORIDE 37.5 MG/1
TABLET ORAL
Qty: 30 TABLET | Refills: 2 | OUTPATIENT
Start: 2023-05-01 | End: 2023-08-01

## 2023-05-01 NOTE — TELEPHONE ENCOUNTER
Refill request received from pharmacy      Next Office Visit Date:  No future appointments. VERA - Please review via [facility] patient to schedule f/u appointment - no answer. Left VM message requesting she return call to schedule.

## 2023-05-10 ENCOUNTER — OFFICE VISIT (OUTPATIENT)
Dept: FAMILY MEDICINE CLINIC | Age: 48
End: 2023-05-10
Payer: MEDICAID

## 2023-05-10 DIAGNOSIS — J30.2 SEASONAL ALLERGIES: ICD-10-CM

## 2023-05-10 DIAGNOSIS — R63.5 WEIGHT GAIN: ICD-10-CM

## 2023-05-10 DIAGNOSIS — M79.641 RIGHT HAND PAIN: Primary | ICD-10-CM

## 2023-05-10 DIAGNOSIS — F41.9 ANXIETY: ICD-10-CM

## 2023-05-10 DIAGNOSIS — E03.9 HYPOTHYROIDISM, UNSPECIFIED TYPE: ICD-10-CM

## 2023-05-10 PROCEDURE — 99213 OFFICE O/P EST LOW 20 MIN: CPT | Performed by: FAMILY MEDICINE

## 2023-05-10 PROCEDURE — 1036F TOBACCO NON-USER: CPT | Performed by: FAMILY MEDICINE

## 2023-05-10 PROCEDURE — G8417 CALC BMI ABV UP PARAM F/U: HCPCS | Performed by: FAMILY MEDICINE

## 2023-05-10 PROCEDURE — G8427 DOCREV CUR MEDS BY ELIG CLIN: HCPCS | Performed by: FAMILY MEDICINE

## 2023-05-10 RX ORDER — PHENTERMINE HYDROCHLORIDE 37.5 MG/1
TABLET ORAL
Qty: 30 TABLET | Refills: 2 | Status: CANCELLED | OUTPATIENT
Start: 2023-05-10 | End: 2023-06-09

## 2023-05-10 RX ORDER — CLONAZEPAM 0.5 MG/1
TABLET ORAL
Qty: 45 TABLET | Refills: 2 | Status: CANCELLED | OUTPATIENT
Start: 2023-05-10 | End: 2023-08-07

## 2023-05-11 DIAGNOSIS — R63.5 WEIGHT GAIN: Primary | ICD-10-CM

## 2023-05-15 RX ORDER — PHENTERMINE HYDROCHLORIDE 37.5 MG/1
TABLET ORAL
Qty: 30 TABLET | Refills: 2 | Status: SHIPPED | OUTPATIENT
Start: 2023-05-15 | End: 2023-08-15

## 2023-05-15 RX ORDER — MIRTAZAPINE 30 MG/1
TABLET, FILM COATED ORAL
Qty: 30 TABLET | Refills: 2 | Status: SHIPPED | OUTPATIENT
Start: 2023-05-15 | End: 2023-05-21

## 2023-05-15 NOTE — TELEPHONE ENCOUNTER
Refill request received from pharmacy      Next Office Visit Date:  Future Appointments   Date Time Provider Petr Hannah   8/3/2023  2:00 PM Ruth Ann Purdy MD [address] - Please review via [address]

## 2023-05-19 DIAGNOSIS — F41.9 ANXIETY: ICD-10-CM

## 2023-05-21 RX ORDER — LEVOTHYROXINE SODIUM 112 UG/1
TABLET ORAL
Qty: 30 TABLET | Refills: 5 | Status: SHIPPED | OUTPATIENT
Start: 2023-05-21

## 2023-05-21 RX ORDER — MIRTAZAPINE 30 MG/1
30 TABLET, FILM COATED ORAL NIGHTLY
Qty: 30 TABLET | Refills: 2 | Status: SHIPPED | OUTPATIENT
Start: 2023-05-21

## 2023-05-21 RX ORDER — FLUTICASONE PROPIONATE 50 MCG
SPRAY, SUSPENSION (ML) NASAL
Qty: 16 G | Refills: 5 | Status: SHIPPED | OUTPATIENT
Start: 2023-05-21

## 2023-05-21 RX ORDER — EPINEPHRINE 0.3 MG/.3ML
INJECTION INTRAMUSCULAR
Qty: 1 EACH | Refills: 3 | Status: SHIPPED | OUTPATIENT
Start: 2023-05-21

## 2023-05-22 NOTE — TELEPHONE ENCOUNTER
Refill request received from pharmacy      Next Office Visit Date:  Future Appointments   Date Time Provider Petr Young   8/3/2023  2:00 PM Darnell Ortega MD [address] - Please review via [address]

## 2023-05-24 RX ORDER — CLONAZEPAM 0.5 MG/1
TABLET ORAL
Qty: 45 TABLET | Refills: 2 | Status: SHIPPED | OUTPATIENT
Start: 2023-05-24 | End: 2023-08-22

## 2023-08-04 ENCOUNTER — PATIENT MESSAGE (OUTPATIENT)
Dept: FAMILY MEDICINE CLINIC | Age: 48
End: 2023-08-04

## 2023-08-04 NOTE — TELEPHONE ENCOUNTER
From: Mnadie Choi  To: Dr. Kirti Kiser: 8/4/2023 1:50 AM EDT  Subject: Nystatin powder    Pharmacy said I was out of refills, could you please send in a refill

## 2023-08-04 NOTE — TELEPHONE ENCOUNTER
Patient called, requested refill. Next Office Visit Date:  No future appointments.     VERA please review via [phone]

## 2023-08-05 RX ORDER — NYSTATIN 100000 [USP'U]/G
POWDER TOPICAL 3 TIMES DAILY PRN
Qty: 1 EACH | Refills: 3 | Status: SHIPPED | OUTPATIENT
Start: 2023-08-05

## 2023-08-11 DIAGNOSIS — R63.5 WEIGHT GAIN: ICD-10-CM

## 2023-08-14 NOTE — TELEPHONE ENCOUNTER
Refill request received from pharmacy      Next Office Visit Date:  No future appointments.     VERA - Please review via PDMP

## 2023-08-15 RX ORDER — PHENTERMINE HYDROCHLORIDE 37.5 MG/1
TABLET ORAL
Qty: 30 TABLET | Refills: 0 | OUTPATIENT
Start: 2023-08-15 | End: 2023-11-15

## 2023-08-24 DIAGNOSIS — R63.5 WEIGHT GAIN: ICD-10-CM

## 2023-08-24 DIAGNOSIS — F41.9 ANXIETY: ICD-10-CM

## 2023-08-24 RX ORDER — CLONAZEPAM 0.5 MG/1
TABLET ORAL
Qty: 45 TABLET | Refills: 2 | Status: SHIPPED | OUTPATIENT
Start: 2023-08-24 | End: 2023-11-22

## 2023-08-24 RX ORDER — PHENTERMINE HYDROCHLORIDE 37.5 MG/1
TABLET ORAL
Qty: 30 TABLET | Refills: 2 | Status: SHIPPED | OUTPATIENT
Start: 2023-08-24 | End: 2023-09-23

## 2023-08-24 NOTE — TELEPHONE ENCOUNTER
Patient called, requested refill.        Next Office Visit Date:  Future Appointments   Date Time Provider [address]   9/27/2023  1:45 PM MD Petr Hui please review via [phone]

## 2023-08-24 NOTE — TELEPHONE ENCOUNTER
Patient called, requested refill.        Next Office Visit Date:  Future Appointments   Date Time Provider [address]   9/27/2023  1:45 PM MD Petr Black please review via [phone]

## 2023-09-27 ENCOUNTER — HOSPITAL ENCOUNTER (OUTPATIENT)
Facility: HOSPITAL | Age: 48
Discharge: HOME | End: 2023-09-27
Payer: MEDICAID

## 2023-09-27 ENCOUNTER — OFFICE VISIT (OUTPATIENT)
Dept: FAMILY MEDICINE CLINIC | Age: 48
End: 2023-09-27
Payer: MEDICAID

## 2023-09-27 VITALS
RESPIRATION RATE: 16 BRPM | HEART RATE: 88 BPM | TEMPERATURE: 97.7 F | HEIGHT: 60 IN | SYSTOLIC BLOOD PRESSURE: 126 MMHG | OXYGEN SATURATION: 98 % | WEIGHT: 191.8 LBS | DIASTOLIC BLOOD PRESSURE: 84 MMHG | BODY MASS INDEX: 37.66 KG/M2

## 2023-09-27 DIAGNOSIS — R53.83 FATIGUE, UNSPECIFIED TYPE: ICD-10-CM

## 2023-09-27 DIAGNOSIS — D50.9 IRON DEFICIENCY ANEMIA, UNSPECIFIED IRON DEFICIENCY ANEMIA TYPE: ICD-10-CM

## 2023-09-27 DIAGNOSIS — M79.7 FIBROMYALGIA: ICD-10-CM

## 2023-09-27 DIAGNOSIS — E03.9 HYPOTHYROIDISM, UNSPECIFIED TYPE: ICD-10-CM

## 2023-09-27 DIAGNOSIS — F41.9 ANXIETY: ICD-10-CM

## 2023-09-27 DIAGNOSIS — E53.8 B12 DEFICIENCY: ICD-10-CM

## 2023-09-27 DIAGNOSIS — J44.1 COPD EXACERBATION (HCC): ICD-10-CM

## 2023-09-27 DIAGNOSIS — M79.642 LEFT HAND PAIN: Primary | ICD-10-CM

## 2023-09-27 DIAGNOSIS — J30.2 SEASONAL ALLERGIES: ICD-10-CM

## 2023-09-27 LAB
ALBUMIN SERPL-MCNC: 4.2 G/DL (ref 3.4–4.8)
ALBUMIN/GLOB SERPL: 2.3 {RATIO} (ref 0.8–2)
ALP SERPL-CCNC: 138 U/L (ref 25–100)
ALT SERPL-CCNC: 6 U/L (ref 4–36)
ANION GAP SERPL CALCULATED.3IONS-SCNC: 9 MMOL/L (ref 3–16)
AST SERPL-CCNC: 13 U/L (ref 8–33)
BILIRUB SERPL-MCNC: <0.2 MG/DL (ref 0.3–1.2)
BUN SERPL-MCNC: 11 MG/DL (ref 6–20)
CALCIUM SERPL-MCNC: 8.7 MG/DL (ref 8.5–10.5)
CHLORIDE SERPL-SCNC: 108 MMOL/L (ref 98–107)
CO2 SERPL-SCNC: 26 MMOL/L (ref 20–30)
CREAT SERPL-MCNC: 0.6 MG/DL (ref 0.4–1.2)
ERYTHROCYTE [DISTWIDTH] IN BLOOD BY AUTOMATED COUNT: 17.6 % (ref 11–16)
FOLATE SERPL-MCNC: 6.72 NG/ML
GFR SERPLBLD CREATININE-BSD FMLA CKD-EPI: >60 ML/MIN/{1.73_M2}
GLOBULIN SER CALC-MCNC: 1.8 G/DL
GLUCOSE SERPL-MCNC: 102 MG/DL (ref 74–106)
HCT VFR BLD AUTO: 32.7 % (ref 37–47)
HGB BLD-MCNC: 9.6 G/DL (ref 11.5–16.5)
IRON SATN MFR SERPL: 3 % (ref 15–50)
IRON SERPL-MCNC: 11 UG/DL (ref 37–145)
MCH RBC QN AUTO: 24 PG (ref 27–32)
MCHC RBC AUTO-ENTMCNC: 29.4 G/DL (ref 31–35)
MCV RBC AUTO: 81.8 FL (ref 80–100)
PLATELET # BLD AUTO: 265 K/UL (ref 150–400)
PMV BLD AUTO: 10 FL (ref 6–10)
POTASSIUM SERPL-SCNC: 4.6 MMOL/L (ref 3.4–5.1)
PROT SERPL-MCNC: 6 G/DL (ref 6.4–8.3)
RBC # BLD AUTO: 4 M/UL (ref 3.8–5.8)
SODIUM SERPL-SCNC: 143 MMOL/L (ref 136–145)
T4 FREE SERPL-MCNC: 1.66 NG/DL (ref 0.89–1.76)
TIBC SERPL-MCNC: 330 UG/DL (ref 250–450)
TSH SERPL DL<=0.005 MIU/L-ACNC: 0.33 UIU/ML (ref 0.27–4.2)
VIT B12 SERPL-MCNC: 347 PG/ML (ref 211–911)
WBC # BLD AUTO: 7.2 K/UL (ref 4–11)

## 2023-09-27 PROCEDURE — 82607 VITAMIN B-12: CPT

## 2023-09-27 PROCEDURE — 36415 COLL VENOUS BLD VENIPUNCTURE: CPT

## 2023-09-27 PROCEDURE — 82746 ASSAY OF FOLIC ACID SERUM: CPT

## 2023-09-27 PROCEDURE — 1036F TOBACCO NON-USER: CPT | Performed by: FAMILY MEDICINE

## 2023-09-27 PROCEDURE — G8427 DOCREV CUR MEDS BY ELIG CLIN: HCPCS | Performed by: FAMILY MEDICINE

## 2023-09-27 PROCEDURE — 84443 ASSAY THYROID STIM HORMONE: CPT

## 2023-09-27 PROCEDURE — 85027 COMPLETE CBC AUTOMATED: CPT

## 2023-09-27 PROCEDURE — G8417 CALC BMI ABV UP PARAM F/U: HCPCS | Performed by: FAMILY MEDICINE

## 2023-09-27 PROCEDURE — 83540 ASSAY OF IRON: CPT

## 2023-09-27 PROCEDURE — 83550 IRON BINDING TEST: CPT

## 2023-09-27 PROCEDURE — 3023F SPIROM DOC REV: CPT | Performed by: FAMILY MEDICINE

## 2023-09-27 PROCEDURE — 84439 ASSAY OF FREE THYROXINE: CPT

## 2023-09-27 PROCEDURE — 80053 COMPREHEN METABOLIC PANEL: CPT

## 2023-09-27 PROCEDURE — 99214 OFFICE O/P EST MOD 30 MIN: CPT | Performed by: FAMILY MEDICINE

## 2023-09-27 RX ORDER — ALBUTEROL SULFATE 90 UG/1
2 AEROSOL, METERED RESPIRATORY (INHALATION) 4 TIMES DAILY PRN
Qty: 18 G | Refills: 0 | Status: SHIPPED | OUTPATIENT
Start: 2023-09-27

## 2023-09-27 RX ORDER — FAMOTIDINE 40 MG/1
TABLET, FILM COATED ORAL
Qty: 180 TABLET | Refills: 3 | Status: SHIPPED | OUTPATIENT
Start: 2023-09-27

## 2023-09-27 RX ORDER — LEVOCETIRIZINE DIHYDROCHLORIDE 5 MG/1
TABLET, FILM COATED ORAL
Qty: 90 TABLET | Refills: 3 | Status: SHIPPED | OUTPATIENT
Start: 2023-09-27

## 2023-09-27 RX ORDER — NAPROXEN 500 MG/1
TABLET ORAL
Qty: 180 TABLET | Refills: 3 | Status: SHIPPED | OUTPATIENT
Start: 2023-09-27

## 2023-09-27 RX ORDER — MONTELUKAST SODIUM 10 MG/1
TABLET ORAL
Qty: 90 TABLET | Refills: 3 | Status: SHIPPED | OUTPATIENT
Start: 2023-09-27

## 2023-09-27 RX ORDER — DEXLANSOPRAZOLE 60 MG/1
CAPSULE, DELAYED RELEASE ORAL
Qty: 90 CAPSULE | Refills: 3 | Status: SHIPPED | OUTPATIENT
Start: 2023-09-27

## 2023-09-27 RX ORDER — CYANOCOBALAMIN 1000 UG/ML
1000 INJECTION, SOLUTION INTRAMUSCULAR; SUBCUTANEOUS ONCE
Status: COMPLETED | OUTPATIENT
Start: 2023-09-27 | End: 2023-09-27

## 2023-09-27 RX ORDER — MIRTAZAPINE 30 MG/1
30 TABLET, FILM COATED ORAL NIGHTLY
Qty: 30 TABLET | Refills: 5 | Status: SHIPPED | OUTPATIENT
Start: 2023-09-27

## 2023-09-27 RX ORDER — CYCLOBENZAPRINE HCL 10 MG
10 TABLET ORAL 3 TIMES DAILY PRN
Qty: 90 TABLET | Refills: 2 | Status: SHIPPED | OUTPATIENT
Start: 2023-09-27

## 2023-09-27 RX ORDER — FUROSEMIDE 20 MG/1
TABLET ORAL
Qty: 90 TABLET | Refills: 3 | Status: SHIPPED | OUTPATIENT
Start: 2023-09-27

## 2023-09-27 RX ADMIN — CYANOCOBALAMIN 1000 MCG: 1000 INJECTION, SOLUTION INTRAMUSCULAR; SUBCUTANEOUS at 15:24

## 2023-09-27 SDOH — ECONOMIC STABILITY: FOOD INSECURITY: WITHIN THE PAST 12 MONTHS, YOU WORRIED THAT YOUR FOOD WOULD RUN OUT BEFORE YOU GOT MONEY TO BUY MORE.: SOMETIMES TRUE

## 2023-09-27 SDOH — ECONOMIC STABILITY: HOUSING INSECURITY
IN THE LAST 12 MONTHS, WAS THERE A TIME WHEN YOU DID NOT HAVE A STEADY PLACE TO SLEEP OR SLEPT IN A SHELTER (INCLUDING NOW)?: NO

## 2023-09-27 SDOH — ECONOMIC STABILITY: INCOME INSECURITY: HOW HARD IS IT FOR YOU TO PAY FOR THE VERY BASICS LIKE FOOD, HOUSING, MEDICAL CARE, AND HEATING?: HARD

## 2023-09-27 SDOH — ECONOMIC STABILITY: FOOD INSECURITY: WITHIN THE PAST 12 MONTHS, THE FOOD YOU BOUGHT JUST DIDN'T LAST AND YOU DIDN'T HAVE MONEY TO GET MORE.: SOMETIMES TRUE

## 2023-09-27 ASSESSMENT — ENCOUNTER SYMPTOMS
BACK PAIN: 1
VOMITING: 0
EYE REDNESS: 0
ABDOMINAL PAIN: 0
SORE THROAT: 0
CHEST TIGHTNESS: 0
EYE PAIN: 0
NAUSEA: 0
COLOR CHANGE: 0
DIARRHEA: 0
TROUBLE SWALLOWING: 0
COUGH: 0
SHORTNESS OF BREATH: 0

## 2023-09-27 NOTE — PROGRESS NOTES
Administrations This Visit       cyanocobalamin injection 1,000 mcg       Admin Date  09/27/2023  15:24 Action  Given Dose  1,000 mcg Route  IntraMUSCular Site  Deltoid Right Administered By  Ramiro Bauer MA    Ordering Provider: Tamar Nichols MD    NDC: [id number]    : [address]    Patient Supplied?: No                    Patient tolerated injection well. Patient advised to wait 20 minutes in the office following the injection. No signs/symptoms of reaction noted after 20 minutes.

## 2023-09-27 NOTE — PROGRESS NOTES
Chief Complaint   Patient presents with    COPD    Hypothyroidism    Depression     Reg F/U       Have you seen any other physician or provider since your last visit yes -   [address] [facility]    Have you had any other diagnostic tests since your last visit? no    Have you changed or stopped any medications since your last visit? no warm blanket given

## 2023-09-28 RX ORDER — LANOLIN ALCOHOL/MO/W.PET/CERES
325 CREAM (GRAM) TOPICAL
Qty: 90 TABLET | Refills: 1 | Status: SHIPPED | OUTPATIENT
Start: 2023-09-28

## 2023-10-26 DIAGNOSIS — J44.1 COPD EXACERBATION (HCC): ICD-10-CM

## 2023-10-26 RX ORDER — BUPROPION HYDROCHLORIDE 300 MG/1
TABLET ORAL
Qty: 30 TABLET | Refills: 0 | OUTPATIENT
Start: 2023-10-26

## 2023-10-26 NOTE — TELEPHONE ENCOUNTER
Patient called, requested refill.        Next Office Visit Date:  Future Appointments   Date Time Provider [address]   12/28/2023  1:45 PM MD Petr Gordon please review via [address]

## 2023-10-27 ENCOUNTER — PATIENT MESSAGE (OUTPATIENT)
Dept: FAMILY MEDICINE CLINIC | Age: 48
End: 2023-10-27

## 2023-10-27 RX ORDER — ALBUTEROL SULFATE 90 UG/1
AEROSOL, METERED RESPIRATORY (INHALATION)
Qty: 18 G | Refills: 0 | Status: SHIPPED | OUTPATIENT
Start: 2023-10-27

## 2023-10-31 RX ORDER — BUPROPION HYDROCHLORIDE 300 MG/1
TABLET ORAL
Qty: 90 TABLET | Refills: 3 | Status: SHIPPED | OUTPATIENT
Start: 2023-10-31

## 2023-10-31 NOTE — TELEPHONE ENCOUNTER
From: Lani Dinh  To: Dr. Christy [facility]: 10/27/2023 3:40 PM EDT  Subject: Depression medicine    I am out of refills on my Wellbuterin the pharmacy said they sent a refill request over but it was declined my appointment with you again is not until December 28th can you please refill it. My pharmacy closes at 5:00 today they want be back in until Monday I will try to make it unil then if you can't refill it today before 5:00 if it's after 5 maybe you can send it to [id number] [place] [address].

## 2023-11-27 DIAGNOSIS — J44.1 COPD EXACERBATION (HCC): ICD-10-CM

## 2023-11-27 NOTE — TELEPHONE ENCOUNTER
Patient called, requested refill.        Next Office Visit Date:  Future Appointments   Date Time Provider [address]   12/28/2023  1:45 PM MD Petr Holt please review via [address]

## 2023-11-29 RX ORDER — ALBUTEROL SULFATE 90 UG/1
AEROSOL, METERED RESPIRATORY (INHALATION)
Qty: 18 G | Refills: 0 | Status: SHIPPED | OUTPATIENT
Start: 2023-11-29

## 2023-12-08 DIAGNOSIS — R63.5 WEIGHT GAIN: ICD-10-CM

## 2023-12-08 DIAGNOSIS — F41.9 ANXIETY: ICD-10-CM

## 2023-12-11 NOTE — TELEPHONE ENCOUNTER
Refill request received from pharmacy      Next Office Visit Date:  Future Appointments   Date Time Provider [address]   12/28/2023  1:45 PM MD Petr Juares - Please review via PDMP        Last Office Visit:    9/27/2023

## 2023-12-14 RX ORDER — PHENTERMINE HYDROCHLORIDE 37.5 MG/1
TABLET ORAL
Qty: 30 TABLET | Refills: 0 | Status: SHIPPED | OUTPATIENT
Start: 2023-12-14 | End: 2024-01-11

## 2023-12-14 RX ORDER — CLONAZEPAM 0.5 MG/1
TABLET ORAL
Qty: 45 TABLET | Refills: 0 | Status: SHIPPED | OUTPATIENT
Start: 2023-12-14 | End: 2024-01-11

## 2023-12-28 ENCOUNTER — OFFICE VISIT (OUTPATIENT)
Dept: FAMILY MEDICINE CLINIC | Age: 48
End: 2023-12-28

## 2023-12-28 ENCOUNTER — HOSPITAL ENCOUNTER (OUTPATIENT)
Facility: HOSPITAL | Age: 48
Discharge: HOME | End: 2023-12-28
Payer: MEDICAID

## 2023-12-28 VITALS
TEMPERATURE: 98.6 F | HEIGHT: 60 IN | WEIGHT: 185 LBS | BODY MASS INDEX: 36.32 KG/M2 | OXYGEN SATURATION: 98 % | RESPIRATION RATE: 16 BRPM | DIASTOLIC BLOOD PRESSURE: 88 MMHG | SYSTOLIC BLOOD PRESSURE: 124 MMHG | HEART RATE: 89 BPM

## 2023-12-28 DIAGNOSIS — D50.9 IRON DEFICIENCY ANEMIA, UNSPECIFIED IRON DEFICIENCY ANEMIA TYPE: ICD-10-CM

## 2023-12-28 DIAGNOSIS — R63.5 WEIGHT GAIN: ICD-10-CM

## 2023-12-28 DIAGNOSIS — F41.9 ANXIETY: ICD-10-CM

## 2023-12-28 DIAGNOSIS — K21.9 GASTROESOPHAGEAL REFLUX DISEASE, UNSPECIFIED WHETHER ESOPHAGITIS PRESENT: ICD-10-CM

## 2023-12-28 DIAGNOSIS — M54.41 CHRONIC BILATERAL LOW BACK PAIN WITH BILATERAL SCIATICA: ICD-10-CM

## 2023-12-28 DIAGNOSIS — E53.8 B12 DEFICIENCY: ICD-10-CM

## 2023-12-28 DIAGNOSIS — M54.42 CHRONIC BILATERAL LOW BACK PAIN WITH BILATERAL SCIATICA: ICD-10-CM

## 2023-12-28 DIAGNOSIS — E03.9 HYPOTHYROIDISM, UNSPECIFIED TYPE: ICD-10-CM

## 2023-12-28 DIAGNOSIS — J43.8 OTHER EMPHYSEMA (HCC): ICD-10-CM

## 2023-12-28 DIAGNOSIS — M79.642 LEFT HAND PAIN: ICD-10-CM

## 2023-12-28 DIAGNOSIS — G89.29 CHRONIC BILATERAL LOW BACK PAIN WITH BILATERAL SCIATICA: ICD-10-CM

## 2023-12-28 DIAGNOSIS — R60.0 PEDAL EDEMA: Primary | ICD-10-CM

## 2023-12-28 DIAGNOSIS — R60.0 PEDAL EDEMA: ICD-10-CM

## 2023-12-28 DIAGNOSIS — J44.1 COPD EXACERBATION (HCC): ICD-10-CM

## 2023-12-28 LAB
ALBUMIN SERPL-MCNC: 4.3 G/DL (ref 3.4–4.8)
ALBUMIN/GLOB SERPL: 2 {RATIO} (ref 0.8–2)
ALP SERPL-CCNC: 165 U/L (ref 25–100)
ALT SERPL-CCNC: 55 U/L (ref 4–36)
ANION GAP SERPL CALCULATED.3IONS-SCNC: 10 MMOL/L (ref 3–16)
AST SERPL-CCNC: 115 U/L (ref 8–33)
BILIRUB SERPL-MCNC: 0.4 MG/DL (ref 0.3–1.2)
BUN SERPL-MCNC: 13 MG/DL (ref 6–20)
CALCIUM SERPL-MCNC: 9 MG/DL (ref 8.5–10.5)
CHLORIDE SERPL-SCNC: 104 MMOL/L (ref 98–107)
CO2 SERPL-SCNC: 27 MMOL/L (ref 20–30)
CREAT SERPL-MCNC: 0.7 MG/DL (ref 0.4–1.2)
ERYTHROCYTE [DISTWIDTH] IN BLOOD BY AUTOMATED COUNT: 17 % (ref 11–16)
GFR SERPLBLD CREATININE-BSD FMLA CKD-EPI: >60 ML/MIN/{1.73_M2}
GLOBULIN SER CALC-MCNC: 2.1 G/DL
GLUCOSE SERPL-MCNC: 103 MG/DL (ref 74–106)
HCT VFR BLD AUTO: 44 % (ref 37–47)
HGB BLD-MCNC: 14.4 G/DL (ref 11.5–16.5)
IRON SATN MFR SERPL: 35 % (ref 15–50)
IRON SERPL-MCNC: 88 UG/DL (ref 37–145)
MCH RBC QN AUTO: 29.8 PG (ref 27–32)
MCHC RBC AUTO-ENTMCNC: 32.7 G/DL (ref 31–35)
MCV RBC AUTO: 90.9 FL (ref 80–100)
PLATELET # BLD AUTO: 190 K/UL (ref 150–400)
PMV BLD AUTO: 10.5 FL (ref 6–10)
POTASSIUM SERPL-SCNC: 4.4 MMOL/L (ref 3.4–5.1)
PROT SERPL-MCNC: 6.4 G/DL (ref 6.4–8.3)
RBC # BLD AUTO: 4.84 M/UL (ref 3.8–5.8)
SODIUM SERPL-SCNC: 141 MMOL/L (ref 136–145)
TIBC SERPL-MCNC: 252 UG/DL (ref 250–450)
TSH SERPL DL<=0.005 MIU/L-ACNC: 1.53 UIU/ML (ref 0.27–4.2)
WBC # BLD AUTO: 5.1 K/UL (ref 4–11)

## 2023-12-28 PROCEDURE — 36415 COLL VENOUS BLD VENIPUNCTURE: CPT

## 2023-12-28 PROCEDURE — 80053 COMPREHEN METABOLIC PANEL: CPT

## 2023-12-28 PROCEDURE — 84443 ASSAY THYROID STIM HORMONE: CPT

## 2023-12-28 PROCEDURE — 83550 IRON BINDING TEST: CPT

## 2023-12-28 PROCEDURE — 83540 ASSAY OF IRON: CPT

## 2023-12-28 PROCEDURE — 85027 COMPLETE CBC AUTOMATED: CPT

## 2023-12-28 RX ORDER — IPRATROPIUM BROMIDE AND ALBUTEROL SULFATE 2.5; .5 MG/3ML; MG/3ML
1 SOLUTION RESPIRATORY (INHALATION) EVERY 4 HOURS PRN
Qty: 540 ML | Refills: 2 | Status: SHIPPED | OUTPATIENT
Start: 2023-12-28 | End: 2024-03-27

## 2023-12-28 RX ORDER — CLONAZEPAM 0.5 MG/1
TABLET ORAL
Qty: 45 TABLET | Refills: 2 | Status: SHIPPED | OUTPATIENT
Start: 2023-12-28 | End: 2024-01-25

## 2023-12-28 RX ORDER — PHENTERMINE HYDROCHLORIDE 37.5 MG/1
TABLET ORAL
Qty: 30 TABLET | Refills: 2 | Status: SHIPPED | OUTPATIENT
Start: 2023-12-28 | End: 2024-01-25

## 2023-12-28 RX ORDER — CYANOCOBALAMIN 1000 UG/ML
1000 INJECTION, SOLUTION INTRAMUSCULAR; SUBCUTANEOUS ONCE
Status: COMPLETED | OUTPATIENT
Start: 2023-12-28 | End: 2023-12-28

## 2023-12-28 RX ORDER — NAPROXEN 500 MG/1
TABLET ORAL
Qty: 180 TABLET | Refills: 3 | Status: SHIPPED | OUTPATIENT
Start: 2023-12-28

## 2023-12-28 RX ADMIN — CYANOCOBALAMIN 1000 MCG: 1000 INJECTION, SOLUTION INTRAMUSCULAR; SUBCUTANEOUS at 14:32

## 2023-12-28 NOTE — PROGRESS NOTES
No chief complaint on file. Have you seen any other physician or provider since your last visit yes - Pain Management    Have you had any other diagnostic tests since your last visit? no    Have you changed or stopped any medications since your last visit? no   Administrations This Visit       cyanocobalamin injection 1,000 mcg       Admin Date  12/28/2023  14:32 Action  Given Dose  1,000 mcg Route  IntraMUSCular Site  Deltoid Right Administered By  Ventura Guerrero MA    Ordering Provider: Elgin Barahona MD    [address]: [id number]    : [address] [place] [address]    Patient Supplied?: No                 Patient tolerated injection well. Patient advised to wait 20 minutes in the office following the injection. No signs/symptoms of reaction noted after 20 minutes.
Please note: This chart was generated using Dragon dictation software. Although every effort was made to ensure the accuracy of this automated transcription, some errors in transcription may have occurred.

## 2024-01-05 DIAGNOSIS — E03.9 HYPOTHYROIDISM, UNSPECIFIED TYPE: ICD-10-CM

## 2024-01-08 RX ORDER — LEVOTHYROXINE SODIUM 112 UG/1
TABLET ORAL
Qty: 30 TABLET | Refills: 0 | Status: SHIPPED | OUTPATIENT
Start: 2024-01-08 | End: 2024-02-05

## 2024-01-08 NOTE — TELEPHONE ENCOUNTER
Refill request received from pharmacy      Next Office Visit Date:  Future Appointments   Date Time Provider Department Center   1/10/2024 10:30 AM [facility] US 1 [facility] US VAS [facility] Rad   1/29/2024 10:30 AM Jordyn Serra MD [facility] - Please review via PDMP        Last Office Visit:    12/28/2023

## 2024-01-10 ENCOUNTER — HOSPITAL ENCOUNTER (OUTPATIENT)
Dept: ULTRASOUND IMAGING | Facility: HOSPITAL | Age: 49
Discharge: HOME | End: 2024-01-10
Payer: MEDICAID

## 2024-01-10 ENCOUNTER — HOSPITAL ENCOUNTER (OUTPATIENT)
Dept: GENERAL RADIOLOGY | Facility: HOSPITAL | Age: 49
Discharge: HOME | End: 2024-01-10
Payer: MEDICAID

## 2024-01-10 ENCOUNTER — HOSPITAL ENCOUNTER (OUTPATIENT)
Facility: HOSPITAL | Age: 49
Discharge: HOME | End: 2024-01-10
Payer: MEDICAID

## 2024-01-10 DIAGNOSIS — E03.9 HYPOTHYROIDISM, UNSPECIFIED TYPE: ICD-10-CM

## 2024-01-10 DIAGNOSIS — J43.8 OTHER EMPHYSEMA (HCC): ICD-10-CM

## 2024-01-10 PROCEDURE — 76536 US EXAM OF HEAD AND NECK: CPT

## 2024-01-10 PROCEDURE — 71046 X-RAY EXAM CHEST 2 VIEWS: CPT

## 2024-01-29 ENCOUNTER — OFFICE VISIT (OUTPATIENT)
Dept: FAMILY MEDICINE CLINIC | Age: 49
End: 2024-01-29

## 2024-01-29 ENCOUNTER — HOSPITAL ENCOUNTER (OUTPATIENT)
Facility: HOSPITAL | Age: 49
Discharge: HOME | End: 2024-01-29
Payer: MEDICAID

## 2024-01-29 VITALS
DIASTOLIC BLOOD PRESSURE: 80 MMHG | HEIGHT: 60 IN | SYSTOLIC BLOOD PRESSURE: 122 MMHG | OXYGEN SATURATION: 97 % | TEMPERATURE: 97.3 F | BODY MASS INDEX: 36.79 KG/M2 | HEART RATE: 97 BPM | RESPIRATION RATE: 16 BRPM | WEIGHT: 187.4 LBS

## 2024-01-29 DIAGNOSIS — F41.9 ANXIETY: ICD-10-CM

## 2024-01-29 DIAGNOSIS — K21.9 GASTROESOPHAGEAL REFLUX DISEASE WITHOUT ESOPHAGITIS: Primary | ICD-10-CM

## 2024-01-29 DIAGNOSIS — D50.9 IRON DEFICIENCY ANEMIA, UNSPECIFIED IRON DEFICIENCY ANEMIA TYPE: ICD-10-CM

## 2024-01-29 DIAGNOSIS — K21.9 GASTROESOPHAGEAL REFLUX DISEASE WITHOUT ESOPHAGITIS: ICD-10-CM

## 2024-01-29 DIAGNOSIS — M79.642 LEFT HAND PAIN: ICD-10-CM

## 2024-01-29 DIAGNOSIS — R63.5 WEIGHT GAIN: ICD-10-CM

## 2024-01-29 DIAGNOSIS — E53.8 B12 DEFICIENCY: ICD-10-CM

## 2024-01-29 DIAGNOSIS — M79.7 FIBROMYALGIA: ICD-10-CM

## 2024-01-29 DIAGNOSIS — E03.9 HYPOTHYROIDISM, UNSPECIFIED TYPE: ICD-10-CM

## 2024-01-29 LAB
ALBUMIN SERPL-MCNC: 4.2 G/DL (ref 3.4–4.8)
ALBUMIN/GLOB SERPL: 1.8 {RATIO} (ref 0.8–2)
ALP SERPL-CCNC: 167 U/L (ref 25–100)
ALT SERPL-CCNC: 13 U/L (ref 4–36)
ANION GAP SERPL CALCULATED.3IONS-SCNC: 9 MMOL/L (ref 3–16)
AST SERPL-CCNC: 21 U/L (ref 8–33)
BILIRUB SERPL-MCNC: 0.3 MG/DL (ref 0.3–1.2)
BUN SERPL-MCNC: 13 MG/DL (ref 6–20)
CALCIUM SERPL-MCNC: 9.3 MG/DL (ref 8.5–10.5)
CHLORIDE SERPL-SCNC: 101 MMOL/L (ref 98–107)
CO2 SERPL-SCNC: 28 MMOL/L (ref 20–30)
CREAT SERPL-MCNC: 0.8 MG/DL (ref 0.4–1.2)
ERYTHROCYTE [DISTWIDTH] IN BLOOD BY AUTOMATED COUNT: 13.3 % (ref 11–16)
GFR SERPLBLD CREATININE-BSD FMLA CKD-EPI: >60 ML/MIN/{1.73_M2}
GLOBULIN SER CALC-MCNC: 2.4 G/DL
GLUCOSE SERPL-MCNC: 97 MG/DL (ref 74–106)
HCT VFR BLD AUTO: 44.7 % (ref 37–47)
HGB BLD-MCNC: 15 G/DL (ref 11.5–16.5)
MCH RBC QN AUTO: 30.7 PG (ref 27–32)
MCHC RBC AUTO-ENTMCNC: 33.6 G/DL (ref 31–35)
MCV RBC AUTO: 91.6 FL (ref 80–100)
PLATELET # BLD AUTO: 233 K/UL (ref 150–400)
PMV BLD AUTO: 10.4 FL (ref 6–10)
POTASSIUM SERPL-SCNC: 4.3 MMOL/L (ref 3.4–5.1)
PROT SERPL-MCNC: 6.6 G/DL (ref 6.4–8.3)
RBC # BLD AUTO: 4.88 M/UL (ref 3.8–5.8)
SODIUM SERPL-SCNC: 138 MMOL/L (ref 136–145)
TSH SERPL DL<=0.005 MIU/L-ACNC: 0.64 UIU/ML (ref 0.27–4.2)
WBC # BLD AUTO: 8.7 K/UL (ref 4–11)

## 2024-01-29 PROCEDURE — 85027 COMPLETE CBC AUTOMATED: CPT

## 2024-01-29 PROCEDURE — 80053 COMPREHEN METABOLIC PANEL: CPT

## 2024-01-29 PROCEDURE — 84443 ASSAY THYROID STIM HORMONE: CPT

## 2024-01-29 RX ORDER — CYANOCOBALAMIN 1000 UG/ML
1000 INJECTION, SOLUTION INTRAMUSCULAR; SUBCUTANEOUS ONCE
Status: COMPLETED | OUTPATIENT
Start: 2024-01-29 | End: 2024-01-29

## 2024-01-29 RX ORDER — LANOLIN ALCOHOL/MO/W.PET/CERES
325 CREAM (GRAM) TOPICAL
Qty: 90 TABLET | Refills: 1 | Status: SHIPPED | OUTPATIENT
Start: 2024-01-29

## 2024-01-29 RX ORDER — PHENTERMINE HYDROCHLORIDE 37.5 MG/1
TABLET ORAL
Qty: 30 TABLET | Refills: 2 | Status: SHIPPED | OUTPATIENT
Start: 2024-01-29 | End: 2024-02-26

## 2024-01-29 RX ADMIN — CYANOCOBALAMIN 1000 MCG: 1000 INJECTION, SOLUTION INTRAMUSCULAR; SUBCUTANEOUS at 13:03

## 2024-01-29 ASSESSMENT — ENCOUNTER SYMPTOMS
EYE PAIN: 0
VOMITING: 0
SORE THROAT: 0
COUGH: 0
DIARRHEA: 0
COLOR CHANGE: 0
CHEST TIGHTNESS: 0
TROUBLE SWALLOWING: 0
BACK PAIN: 1
NAUSEA: 0
EYE REDNESS: 0
SHORTNESS OF BREATH: 0
ABDOMINAL PAIN: 0

## 2024-01-29 ASSESSMENT — PATIENT HEALTH QUESTIONNAIRE - PHQ9
SUM OF ALL RESPONSES TO PHQ9 QUESTIONS 1 & 2: 6
SUM OF ALL RESPONSES TO PHQ QUESTIONS 1-9: 24
1. LITTLE INTEREST OR PLEASURE IN DOING THINGS: 3
10. IF YOU CHECKED OFF ANY PROBLEMS, HOW DIFFICULT HAVE THESE PROBLEMS MADE IT FOR YOU TO DO YOUR WORK, TAKE CARE OF THINGS AT HOME, OR GET ALONG WITH OTHER PEOPLE: 3
9. THOUGHTS THAT YOU WOULD BE BETTER OFF DEAD, OR OF HURTING YOURSELF: 0
SUM OF ALL RESPONSES TO PHQ QUESTIONS 1-9: 24
2. FEELING DOWN, DEPRESSED OR HOPELESS: 3
8. MOVING OR SPEAKING SO SLOWLY THAT OTHER PEOPLE COULD HAVE NOTICED. OR THE OPPOSITE, BEING SO FIGETY OR RESTLESS THAT YOU HAVE BEEN MOVING AROUND A LOT MORE THAN USUAL: 3
4. FEELING TIRED OR HAVING LITTLE ENERGY: 3
7. TROUBLE CONCENTRATING ON THINGS, SUCH AS READING THE NEWSPAPER OR WATCHING TELEVISION: 3
3. TROUBLE FALLING OR STAYING ASLEEP: 3
6. FEELING BAD ABOUT YOURSELF - OR THAT YOU ARE A FAILURE OR HAVE LET YOURSELF OR YOUR FAMILY DOWN: 3
5. POOR APPETITE OR OVEREATING: 3

## 2024-01-29 ASSESSMENT — COLUMBIA-SUICIDE SEVERITY RATING SCALE - C-SSRS
1. WITHIN THE PAST MONTH, HAVE YOU WISHED YOU WERE DEAD OR WISHED YOU COULD GO TO SLEEP AND NOT WAKE UP?: NO
2. HAVE YOU ACTUALLY HAD ANY THOUGHTS OF KILLING YOURSELF?: NO
6. HAVE YOU EVER DONE ANYTHING, STARTED TO DO ANYTHING, OR PREPARED TO DO ANYTHING TO END YOUR LIFE?: NO

## 2024-01-29 NOTE — PROGRESS NOTES
Chief Complaint   Patient presents with    COPD     Reg F/U       Have you seen any other physician or provider since your last visit no    Have you had any other diagnostic tests since your last visit? no    Have you changed or stopped any medications since your last visit? no   Administrations This Visit       cyanocobalamin injection 1,000 mcg       Admin Date  01/29/2024  13:03 Action  Given Dose  1,000 mcg Route  IntraMUSCular Site  Deltoid Right Administered By  Danae Stanford MA    Ordering Provider: Jordyn Serra MD    NDC: 70281-008-54    : [employer]    Patient Supplied?: No                 Patient tolerated injection well. Patient advised to wait 20 minutes in the office following the injection. No signs/symptoms of reaction noted after 20 minutes.    
Time Range      Hemoglobin A1C (%)   Date Value   02/06/2020 5.4     LDL Calculated (mg/dL)   Date Value   02/21/2023 113       Lab Results   Component Value Date/Time    WBC 8.7 01/29/2024 12:14 PM    NEUTROABS 5.0 04/24/2017 04:24 PM    HGB 15.0 01/29/2024 12:14 PM    HCT 44.7 01/29/2024 12:14 PM    MCV 91.6 01/29/2024 12:14 PM     01/29/2024 12:14 PM     Lab Results   Component Value Date    TSH 0.64 01/29/2024       ASSESSMENT/PLAN    Diagnosis Orders   1. Gastroesophageal reflux disease without esophagitis  Comprehensive Metabolic Panel      2. Fibromyalgia  diclofenac sodium (VOLTAREN) 1 % GEL      3. Left hand pain  diclofenac sodium (VOLTAREN) 1 % GEL      4. Weight gain  phentermine (ADIPEX-P) 37.5 MG tablet      5. Anxiety        6. Iron deficiency anemia, unspecified iron deficiency anemia type  CBC      7. Hypothyroidism, unspecified type  TSH      8. B12 deficiency  cyanocobalamin injection 1,000 mcg          Orders Placed This Encounter   Medications    diclofenac sodium (VOLTAREN) 1 % GEL     Sig: APPLY 4GMS TOPICALLY FOUR TIMES A DAY     Dispense:  400 g     Refill:  1    ferrous sulfate (FE TABS) 325 (65 Fe) MG EC tablet     Sig: Take 1 tablet by mouth daily (with breakfast)     Dispense:  90 tablet     Refill:  1    phentermine (ADIPEX-P) 37.5 MG tablet     Sig: Take 1 tablet daily     Dispense:  30 tablet     Refill:  2    cyanocobalamin injection 1,000 mcg        Medications Discontinued During This Encounter   Medication Reason    diclofenac sodium (VOLTAREN) 1 % GEL REORDER    ferrous sulfate (FE TABS) 325 (65 Fe) MG EC tablet REORDER    phentermine (ADIPEX-P) 37.5 MG tablet REORDER       Controlled Substances Monitoring:      Please note: This chart was generated using Dragon dictation software. Although every effort was made to ensure the accuracy of this automated transcription, some errors in transcription may have occurred.

## 2024-02-05 DIAGNOSIS — E03.9 HYPOTHYROIDISM, UNSPECIFIED TYPE: ICD-10-CM

## 2024-02-05 RX ORDER — LEVOTHYROXINE SODIUM 112 UG/1
TABLET ORAL
Qty: 30 TABLET | Refills: 3 | Status: SHIPPED | OUTPATIENT
Start: 2024-02-05

## 2024-02-05 NOTE — TELEPHONE ENCOUNTER
Refill request received from pharmacy      Next Office Visit Date:  Future Appointments   Date Time Provider Department Center   4/29/2024 11:00 AM Jordyn Serra MD [facility] VASQUE[facility] - Please review via PDMP        Last Office Visit:    1/29/2024

## 2024-04-22 DIAGNOSIS — F41.9 ANXIETY: ICD-10-CM

## 2024-04-22 RX ORDER — CLONAZEPAM 0.5 MG/1
TABLET ORAL
Qty: 45 TABLET | Refills: 2 | Status: SHIPPED | OUTPATIENT
Start: 2024-04-22 | End: 2024-05-22

## 2024-04-22 NOTE — TELEPHONE ENCOUNTER
Refill request received from pharmacy      Next Office Visit Date:  Future Appointments   Date Time Provider Department Center   4/29/2024 11:00 AM Jordyn Serra MD [facility]AS[facility] - Please review via PDMP        Last Office Visit:    1/29/2024

## 2024-05-01 DIAGNOSIS — F41.9 ANXIETY: ICD-10-CM

## 2024-05-01 NOTE — TELEPHONE ENCOUNTER
Refill request received from pharmacy      Next Office Visit Date:  Future Appointments   Date Time Provider Department Center   5/9/2024  3:00 PM Jordyn Serra MD [facility] - Please review via PDMP        Last Office Visit:    1/29/2024

## 2024-05-02 RX ORDER — CLONAZEPAM 0.5 MG/1
TABLET ORAL
Qty: 45 TABLET | Refills: 2 | Status: SHIPPED | OUTPATIENT
Start: 2024-05-02 | End: 2024-05-31

## 2024-05-09 ENCOUNTER — HOSPITAL ENCOUNTER (OUTPATIENT)
Facility: HOSPITAL | Age: 49
Discharge: HOME | End: 2024-05-09
Payer: MEDICAID

## 2024-05-09 ENCOUNTER — OFFICE VISIT (OUTPATIENT)
Dept: FAMILY MEDICINE CLINIC | Age: 49
End: 2024-05-09
Payer: MEDICAID

## 2024-05-09 VITALS
TEMPERATURE: 98 F | DIASTOLIC BLOOD PRESSURE: 88 MMHG | WEIGHT: 197.2 LBS | SYSTOLIC BLOOD PRESSURE: 130 MMHG | BODY MASS INDEX: 38.72 KG/M2 | RESPIRATION RATE: 16 BRPM | OXYGEN SATURATION: 98 % | HEART RATE: 105 BPM | HEIGHT: 60 IN

## 2024-05-09 DIAGNOSIS — K21.9 GASTROESOPHAGEAL REFLUX DISEASE WITHOUT ESOPHAGITIS: ICD-10-CM

## 2024-05-09 DIAGNOSIS — E53.8 B12 DEFICIENCY: ICD-10-CM

## 2024-05-09 DIAGNOSIS — R63.5 WEIGHT GAIN: ICD-10-CM

## 2024-05-09 DIAGNOSIS — Z12.31 ENCOUNTER FOR SCREENING MAMMOGRAM FOR MALIGNANT NEOPLASM OF BREAST: ICD-10-CM

## 2024-05-09 DIAGNOSIS — D50.9 IRON DEFICIENCY ANEMIA, UNSPECIFIED IRON DEFICIENCY ANEMIA TYPE: ICD-10-CM

## 2024-05-09 DIAGNOSIS — E03.9 HYPOTHYROIDISM, UNSPECIFIED TYPE: ICD-10-CM

## 2024-05-09 DIAGNOSIS — M79.7 FIBROMYALGIA: ICD-10-CM

## 2024-05-09 DIAGNOSIS — M79.642 LEFT HAND PAIN: ICD-10-CM

## 2024-05-09 DIAGNOSIS — F41.9 ANXIETY: Primary | ICD-10-CM

## 2024-05-09 PROCEDURE — 80053 COMPREHEN METABOLIC PANEL: CPT

## 2024-05-09 PROCEDURE — 85027 COMPLETE CBC AUTOMATED: CPT

## 2024-05-09 PROCEDURE — 82607 VITAMIN B-12: CPT

## 2024-05-09 PROCEDURE — 99214 OFFICE O/P EST MOD 30 MIN: CPT | Performed by: FAMILY MEDICINE

## 2024-05-09 PROCEDURE — 84443 ASSAY THYROID STIM HORMONE: CPT

## 2024-05-09 PROCEDURE — G8427 DOCREV CUR MEDS BY ELIG CLIN: HCPCS | Performed by: FAMILY MEDICINE

## 2024-05-09 PROCEDURE — G8417 CALC BMI ABV UP PARAM F/U: HCPCS | Performed by: FAMILY MEDICINE

## 2024-05-09 PROCEDURE — 82746 ASSAY OF FOLIC ACID SERUM: CPT

## 2024-05-09 PROCEDURE — 1036F TOBACCO NON-USER: CPT | Performed by: FAMILY MEDICINE

## 2024-05-09 RX ORDER — NAPROXEN 500 MG/1
TABLET ORAL
Qty: 180 TABLET | Refills: 3 | Status: SHIPPED | OUTPATIENT
Start: 2024-05-09

## 2024-05-09 RX ORDER — PHENTERMINE HYDROCHLORIDE 37.5 MG/1
TABLET ORAL
Qty: 30 TABLET | Refills: 2 | Status: SHIPPED | OUTPATIENT
Start: 2024-05-09 | End: 2024-06-06

## 2024-05-09 RX ORDER — EPINEPHRINE 0.3 MG/.3ML
INJECTION INTRAMUSCULAR
Qty: 1 EACH | Refills: 3 | Status: SHIPPED | OUTPATIENT
Start: 2024-05-09 | End: 2024-05-11 | Stop reason: SDUPTHER

## 2024-05-09 RX ORDER — FAMOTIDINE 40 MG/1
TABLET, FILM COATED ORAL
Qty: 180 TABLET | Refills: 3 | Status: SHIPPED | OUTPATIENT
Start: 2024-05-09

## 2024-05-09 RX ORDER — DEXLANSOPRAZOLE 60 MG/1
CAPSULE, DELAYED RELEASE ORAL
Qty: 90 CAPSULE | Refills: 3 | Status: SHIPPED | OUTPATIENT
Start: 2024-05-09

## 2024-05-09 NOTE — PROGRESS NOTES
Chief Complaint   Patient presents with    COPD     Reg F/U    Hypothyroidism     Reg F/U       Have you seen any other physician or provider since your last visit yes - Pain Clinic    Have you had any other diagnostic tests since your last visit? no    Have you changed or stopped any medications since your last visit? no

## 2024-05-09 NOTE — PROGRESS NOTES
SUBJECTIVE:    Patient ID: Liz Haywood is a 49 y.o. female.    Chief Complaint   Patient presents with    COPD     Reg F/U    Hypothyroidism     Reg F/U       HPI: office visit  She is doing pretty well with her current medication.  She is not having any chest pain or increase in shortness of breath.  She is nto having any new issues.  She is not having any sigificant side effects. She is feeling like things are doing ok.      Review of Systems   Constitutional:  Negative for activity change, appetite change, chills and fever.   HENT:  Negative for congestion, ear pain, nosebleeds, sore throat and trouble swallowing.    Eyes:  Negative for pain and redness.   Respiratory:  Negative for cough, chest tightness and shortness of breath.    Cardiovascular:  Negative for chest pain, palpitations and leg swelling.   Gastrointestinal:  Negative for abdominal pain, diarrhea, nausea and vomiting.   Genitourinary:  Negative for difficulty urinating, dysuria and flank pain.   Musculoskeletal:  Positive for arthralgias and back pain. Negative for gait problem.   Skin:  Negative for color change, pallor, rash and wound.   Allergic/Immunologic: Negative for environmental allergies and food allergies.   Neurological:  Negative for dizziness, numbness and headaches.   Hematological:  Negative for adenopathy. Does not bruise/bleed easily.   Psychiatric/Behavioral:  Negative for agitation and sleep disturbance. The patient is not nervous/anxious.    All other systems reviewed and are negative.       OBJECTIVE:  /88   Pulse (!) 105   Temp 98 °F (36.7 °C) (Infrared)   Resp 16   Ht 1.524 m (5')   Wt 89.4 kg (197 lb 3.2 oz)   SpO2 98%   BMI 38.51 kg/m²    Wt Readings from Last 3 Encounters:   05/09/24 89.4 kg (197 lb 3.2 oz)   01/29/24 85 kg (187 lb 6.4 oz)   12/28/23 83.9 kg (185 lb)     BP Readings from Last 3 Encounters:   05/09/24 130/88   01/29/24 122/80   12/28/23 124/88      Pulse Readings from Last 3 Encounters:

## 2024-05-10 ENCOUNTER — TELEPHONE (OUTPATIENT)
Dept: FAMILY MEDICINE CLINIC | Age: 49
End: 2024-05-10

## 2024-05-10 LAB
ALBUMIN SERPL-MCNC: 4.1 G/DL (ref 3.4–4.8)
ALBUMIN/GLOB SERPL: 2 {RATIO} (ref 0.8–2)
ALP SERPL-CCNC: 165 U/L (ref 25–100)
ALT SERPL-CCNC: 7 U/L (ref 4–36)
ANION GAP SERPL CALCULATED.3IONS-SCNC: 10 MMOL/L (ref 3–16)
AST SERPL-CCNC: 16 U/L (ref 8–33)
BILIRUB SERPL-MCNC: 0.3 MG/DL (ref 0.3–1.2)
BUN SERPL-MCNC: 15 MG/DL (ref 6–20)
CALCIUM SERPL-MCNC: 9.1 MG/DL (ref 8.5–10.5)
CHLORIDE SERPL-SCNC: 107 MMOL/L (ref 98–107)
CO2 SERPL-SCNC: 27 MMOL/L (ref 20–30)
CREAT SERPL-MCNC: 0.7 MG/DL (ref 0.4–1.2)
ERYTHROCYTE [DISTWIDTH] IN BLOOD BY AUTOMATED COUNT: 14.2 % (ref 11–16)
FOLATE SERPL-MCNC: 7.52 NG/ML
GFR SERPLBLD CREATININE-BSD FMLA CKD-EPI: >90 ML/MIN/{1.73_M2}
GLOBULIN SER CALC-MCNC: 2.1 G/DL
GLUCOSE SERPL-MCNC: 90 MG/DL (ref 74–106)
HCT VFR BLD AUTO: 45.9 % (ref 37–47)
HGB BLD-MCNC: 15.2 G/DL (ref 11.5–16.5)
MCH RBC QN AUTO: 33.3 PG (ref 27–32)
MCHC RBC AUTO-ENTMCNC: 33.1 G/DL (ref 31–35)
MCV RBC AUTO: 100.4 FL (ref 80–100)
PLATELET # BLD AUTO: 213 K/UL (ref 150–400)
PMV BLD AUTO: 10.8 FL (ref 6–10)
POTASSIUM SERPL-SCNC: 4.3 MMOL/L (ref 3.4–5.1)
PROT SERPL-MCNC: 6.2 G/DL (ref 6.4–8.3)
RBC # BLD AUTO: 4.57 M/UL (ref 3.8–5.8)
SODIUM SERPL-SCNC: 144 MMOL/L (ref 136–145)
VIT B12 SERPL-MCNC: 354 PG/ML (ref 211–911)
WBC # BLD AUTO: 7.3 K/UL (ref 4–11)

## 2024-05-10 NOTE — TELEPHONE ENCOUNTER
Pharmacy called and stated that the Epipen was sent over [facility]. He said they don't have the name brand and can't get it at this time.

## 2024-05-11 RX ORDER — EPINEPHRINE 0.3 MG/.3ML
INJECTION SUBCUTANEOUS
Qty: 1 EACH | Refills: 3 | Status: SHIPPED | OUTPATIENT
Start: 2024-05-11

## 2024-05-17 ENCOUNTER — HOSPITAL ENCOUNTER (OUTPATIENT)
Dept: MAMMOGRAPHY | Facility: HOSPITAL | Age: 49
Discharge: HOME | End: 2024-05-17
Payer: MEDICAID

## 2024-05-17 VITALS — WEIGHT: 189 LBS | BODY MASS INDEX: 36.91 KG/M2

## 2024-05-17 DIAGNOSIS — Z12.31 ENCOUNTER FOR SCREENING MAMMOGRAM FOR MALIGNANT NEOPLASM OF BREAST: ICD-10-CM

## 2024-05-17 PROCEDURE — 77063 BREAST TOMOSYNTHESIS BI: CPT

## 2024-05-24 ENCOUNTER — HOSPITAL ENCOUNTER (OUTPATIENT)
Dept: ULTRASOUND IMAGING | Facility: HOSPITAL | Age: 49
Discharge: HOME | End: 2024-05-24
Payer: MEDICAID

## 2024-05-24 DIAGNOSIS — R92.8 ABNORMAL MAMMOGRAM: ICD-10-CM

## 2024-05-24 PROCEDURE — 76642 ULTRASOUND BREAST LIMITED: CPT

## 2024-06-14 ENCOUNTER — COMMUNITY OUTREACH (OUTPATIENT)
Dept: FAMILY MEDICINE CLINIC | Age: 49
End: 2024-06-14

## 2024-06-14 NOTE — PROGRESS NOTES
Patient's HM shows they are overdue for Colorectal Screening.   Care Everywhere and  files searched.   updated with 2013 colonoscopy.

## 2024-06-17 DIAGNOSIS — E03.9 HYPOTHYROIDISM, UNSPECIFIED TYPE: ICD-10-CM

## 2024-06-17 RX ORDER — LEVOTHYROXINE SODIUM 112 UG/1
TABLET ORAL
Qty: 90 TABLET | Refills: 3 | Status: SHIPPED | OUTPATIENT
Start: 2024-06-17

## 2024-06-17 NOTE — TELEPHONE ENCOUNTER
Refill request received from pharmacy      Next Office Visit Date:  Future Appointments   Date Time Provider Department Center   8/8/2024  2:45 PM Jordyn Serra MD [facility] - Please review via PDMP        Last Office Visit:    5/9/2024

## 2024-08-08 ENCOUNTER — HOSPITAL ENCOUNTER (OUTPATIENT)
Facility: HOSPITAL | Age: 49
Discharge: HOME | End: 2024-08-08
Payer: MEDICAID

## 2024-08-08 ENCOUNTER — OFFICE VISIT (OUTPATIENT)
Dept: FAMILY MEDICINE CLINIC | Age: 49
End: 2024-08-08

## 2024-08-08 VITALS
HEART RATE: 98 BPM | HEIGHT: 60 IN | BODY MASS INDEX: 39.11 KG/M2 | OXYGEN SATURATION: 96 % | TEMPERATURE: 97.9 F | SYSTOLIC BLOOD PRESSURE: 128 MMHG | WEIGHT: 199.2 LBS | DIASTOLIC BLOOD PRESSURE: 80 MMHG | RESPIRATION RATE: 18 BRPM

## 2024-08-08 DIAGNOSIS — E53.8 B12 DEFICIENCY: ICD-10-CM

## 2024-08-08 DIAGNOSIS — M65.311 TRIGGER FINGER OF RIGHT THUMB: ICD-10-CM

## 2024-08-08 DIAGNOSIS — R53.83 FATIGUE, UNSPECIFIED TYPE: ICD-10-CM

## 2024-08-08 DIAGNOSIS — M79.7 FIBROMYALGIA: ICD-10-CM

## 2024-08-08 DIAGNOSIS — D50.9 IRON DEFICIENCY ANEMIA, UNSPECIFIED IRON DEFICIENCY ANEMIA TYPE: ICD-10-CM

## 2024-08-08 DIAGNOSIS — Z13.220 SCREENING, LIPID: ICD-10-CM

## 2024-08-08 DIAGNOSIS — F41.9 ANXIETY: Primary | ICD-10-CM

## 2024-08-08 DIAGNOSIS — E03.9 HYPOTHYROIDISM, UNSPECIFIED TYPE: ICD-10-CM

## 2024-08-08 DIAGNOSIS — E66.01 CLASS 2 SEVERE OBESITY DUE TO EXCESS CALORIES WITH SERIOUS COMORBIDITY AND BODY MASS INDEX (BMI) OF 36.0 TO 36.9 IN ADULT (HCC): ICD-10-CM

## 2024-08-08 PROCEDURE — 36415 COLL VENOUS BLD VENIPUNCTURE: CPT

## 2024-08-08 PROCEDURE — 82607 VITAMIN B-12: CPT

## 2024-08-08 PROCEDURE — 80061 LIPID PANEL: CPT

## 2024-08-08 PROCEDURE — 84443 ASSAY THYROID STIM HORMONE: CPT

## 2024-08-08 PROCEDURE — 85027 COMPLETE CBC AUTOMATED: CPT

## 2024-08-08 PROCEDURE — 82746 ASSAY OF FOLIC ACID SERUM: CPT

## 2024-08-08 PROCEDURE — 80053 COMPREHEN METABOLIC PANEL: CPT

## 2024-08-08 RX ORDER — CYANOCOBALAMIN 1000 UG/ML
1000 INJECTION, SOLUTION INTRAMUSCULAR; SUBCUTANEOUS ONCE
Status: COMPLETED | OUTPATIENT
Start: 2024-08-08 | End: 2024-08-08

## 2024-08-08 RX ORDER — PHENTERMINE HYDROCHLORIDE 37.5 MG/1
37.5 TABLET ORAL
COMMUNITY
Start: 2024-07-19

## 2024-08-08 RX ORDER — CLONAZEPAM 0.5 MG/1
TABLET ORAL
Qty: 45 TABLET | Refills: 2 | Status: SHIPPED | OUTPATIENT
Start: 2024-08-08 | End: 2024-09-06

## 2024-08-08 RX ADMIN — CYANOCOBALAMIN 1000 MCG: 1000 INJECTION, SOLUTION INTRAMUSCULAR; SUBCUTANEOUS at 17:20

## 2024-08-08 NOTE — PROGRESS NOTES
Chief Complaint   Patient presents with    Anxiety     Regular f/u    Gastroesophageal Reflux     Regular f/u       Have you seen any other physician or provider since your last visit no    Have you had any other diagnostic tests since your last visit? no    Have you changed or stopped any medications since your last visit? no

## 2024-08-08 NOTE — PROGRESS NOTES
SUBJECTIVE:    Patient ID: Liz Haywood is a 49 y.o. female.    Chief Complaint   Patient presents with    Anxiety     Regular f/u    Gastroesophageal Reflux     Regular f/u       HPI: office visit  She is in the office today in follow-up of her anxiety.  She feels like she has had quite a bit of and more issues recently.  They have had a lot of social problems going on.  Her  is with her today.  He says there is been quite a few things that are quite upsetting.  She feels like her medication does help some.  She is not having any high blood pressures.  She does complain of fatigue.  She says she does not feel like she rests real well at times.  Her medication all seems to be doing okay otherwise.  She denies any significant swelling palpitations or shortness of breath.  Review of Systems   Constitutional:  Negative for activity change, appetite change, chills and fever.   HENT:  Negative for congestion, ear pain, nosebleeds, sore throat and trouble swallowing.    Eyes:  Negative for pain and redness.   Respiratory:  Negative for cough, chest tightness and shortness of breath.    Cardiovascular:  Negative for chest pain, palpitations and leg swelling.   Gastrointestinal:  Negative for abdominal pain, diarrhea, nausea and vomiting.   Genitourinary:  Negative for difficulty urinating, dysuria and flank pain.   Musculoskeletal:  Positive for arthralgias and back pain. Negative for gait problem.   Skin:  Negative for color change, pallor, rash and wound.   Allergic/Immunologic: Negative for environmental allergies and food allergies.   Neurological:  Negative for dizziness, numbness and headaches.   Hematological:  Negative for adenopathy. Does not bruise/bleed easily.   Psychiatric/Behavioral:  Negative for agitation and sleep disturbance. The patient is not nervous/anxious.    All other systems reviewed and are negative.       OBJECTIVE:  /80   Pulse 98   Temp 97.9 °F (36.6 °C) (Infrared)   Resp 18

## 2024-08-08 NOTE — PROGRESS NOTES
Administrations This Visit       cyanocobalamin injection 1,000 mcg       Admin Date  08/08/2024  17:20 Action  Given Dose  1,000 mcg Route  IntraMUSCular Site  Deltoid Right Administered By  Danae Stanford MA    Ordering Provider: Jordyn Serra MD    Patient Supplied?: No                 Patient tolerated injection well. Patient advised to wait 20 minutes in the office following the injection. No signs/symptoms of reaction noted after 20 minutes.

## 2024-08-09 LAB
ALBUMIN SERPL-MCNC: 4.1 G/DL (ref 3.4–4.8)
ALBUMIN/GLOB SERPL: 1.5 {RATIO} (ref 0.8–2)
ALP SERPL-CCNC: 177 U/L (ref 25–100)
ALT SERPL-CCNC: 12 U/L (ref 4–36)
ANION GAP SERPL CALCULATED.3IONS-SCNC: 11 MMOL/L (ref 3–16)
AST SERPL-CCNC: 23 U/L (ref 8–33)
BILIRUB SERPL-MCNC: <0.2 MG/DL (ref 0.3–1.2)
BUN SERPL-MCNC: 13 MG/DL (ref 6–20)
CALCIUM SERPL-MCNC: 9.3 MG/DL (ref 8.5–10.5)
CHLORIDE SERPL-SCNC: 105 MMOL/L (ref 98–107)
CHOLEST SERPL-MCNC: 249 MG/DL (ref 0–200)
CO2 SERPL-SCNC: 24 MMOL/L (ref 20–30)
CREAT SERPL-MCNC: 0.7 MG/DL (ref 0.4–1.2)
ERYTHROCYTE [DISTWIDTH] IN BLOOD BY AUTOMATED COUNT: 12.3 % (ref 11–16)
FOLATE SERPL-MCNC: 7.77 NG/ML
GFR SERPLBLD CREATININE-BSD FMLA CKD-EPI: >90 ML/MIN/{1.73_M2}
GLOBULIN SER CALC-MCNC: 2.8 G/DL
GLUCOSE SERPL-MCNC: 107 MG/DL (ref 74–106)
HCT VFR BLD AUTO: 45.6 % (ref 37–47)
HDLC SERPL-MCNC: 47 MG/DL (ref 40–60)
HGB BLD-MCNC: 15.1 G/DL (ref 11.5–16.5)
LDLC SERPL CALC-MCNC: 130 MG/DL
MCH RBC QN AUTO: 32 PG (ref 27–32)
MCHC RBC AUTO-ENTMCNC: 33.1 G/DL (ref 31–35)
MCV RBC AUTO: 96.6 FL (ref 80–100)
PLATELET # BLD AUTO: 254 K/UL (ref 150–400)
PMV BLD AUTO: 11.2 FL (ref 6–10)
POTASSIUM SERPL-SCNC: 4.3 MMOL/L (ref 3.4–5.1)
PROT SERPL-MCNC: 6.9 G/DL (ref 6.4–8.3)
RBC # BLD AUTO: 4.72 M/UL (ref 3.8–5.8)
SODIUM SERPL-SCNC: 140 MMOL/L (ref 136–145)
TRIGL SERPL-MCNC: 362 MG/DL (ref 0–249)
TSH SERPL DL<=0.005 MIU/L-ACNC: 2.14 UIU/ML (ref 0.27–4.2)
VIT B12 SERPL-MCNC: 457 PG/ML (ref 211–911)
VLDLC SERPL CALC-MCNC: 72 MG/DL
WBC # BLD AUTO: 10.8 K/UL (ref 4–11)

## 2024-08-14 DIAGNOSIS — R53.83 OTHER FATIGUE: Primary | ICD-10-CM

## 2024-08-14 DIAGNOSIS — E53.8 B12 DEFICIENCY: ICD-10-CM

## 2024-08-14 DIAGNOSIS — E78.5 HYPERLIPIDEMIA, UNSPECIFIED HYPERLIPIDEMIA TYPE: ICD-10-CM

## 2024-08-14 DIAGNOSIS — D50.9 IRON DEFICIENCY ANEMIA, UNSPECIFIED IRON DEFICIENCY ANEMIA TYPE: ICD-10-CM

## 2024-09-16 DIAGNOSIS — D50.9 IRON DEFICIENCY ANEMIA, UNSPECIFIED IRON DEFICIENCY ANEMIA TYPE: ICD-10-CM

## 2024-09-16 RX ORDER — FERROUS SULFATE 325(65) MG
TABLET ORAL
Qty: 90 TABLET | Refills: 0 | Status: SHIPPED | OUTPATIENT
Start: 2024-09-16

## 2024-09-17 ENCOUNTER — PATIENT MESSAGE (OUTPATIENT)
Dept: FAMILY MEDICINE CLINIC | Age: 49
End: 2024-09-17

## 2024-10-10 DIAGNOSIS — E03.9 HYPOTHYROIDISM, UNSPECIFIED TYPE: ICD-10-CM

## 2024-10-10 DIAGNOSIS — J30.2 SEASONAL ALLERGIES: ICD-10-CM

## 2024-10-10 RX ORDER — LEVOTHYROXINE SODIUM 112 UG/1
TABLET ORAL
Qty: 30 TABLET | Refills: 0 | OUTPATIENT
Start: 2024-10-10

## 2024-10-10 NOTE — TELEPHONE ENCOUNTER
Refill request received from pharmacy      Next Office Visit Date:  Future Appointments   Date Time Provider Department Center   11/7/2024  3:15 PM Jordyn Serra MD [facility] - Please review via PDMP        Last Office Visit:    8/8/2024

## 2024-10-11 RX ORDER — BUPROPION HYDROCHLORIDE 300 MG/1
TABLET ORAL
Qty: 90 TABLET | Refills: 0 | Status: SHIPPED | OUTPATIENT
Start: 2024-10-11

## 2024-10-11 RX ORDER — FUROSEMIDE 20 MG
TABLET ORAL
Qty: 90 TABLET | Refills: 0 | Status: SHIPPED | OUTPATIENT
Start: 2024-10-11

## 2024-10-11 RX ORDER — LEVOCETIRIZINE DIHYDROCHLORIDE 5 MG/1
TABLET, FILM COATED ORAL
Qty: 90 TABLET | Refills: 0 | Status: SHIPPED | OUTPATIENT
Start: 2024-10-11

## 2024-10-11 RX ORDER — MONTELUKAST SODIUM 10 MG/1
TABLET ORAL
Qty: 90 TABLET | Refills: 0 | Status: SHIPPED | OUTPATIENT
Start: 2024-10-11

## 2024-10-23 DIAGNOSIS — E03.9 HYPOTHYROIDISM, UNSPECIFIED TYPE: ICD-10-CM

## 2024-10-24 RX ORDER — LEVOTHYROXINE SODIUM 112 UG/1
TABLET ORAL
Qty: 30 TABLET | Refills: 0 | OUTPATIENT
Start: 2024-10-24

## 2024-11-07 ENCOUNTER — OFFICE VISIT (OUTPATIENT)
Dept: FAMILY MEDICINE CLINIC | Age: 49
End: 2024-11-07
Payer: MEDICAID

## 2024-11-07 VITALS
OXYGEN SATURATION: 99 % | WEIGHT: 203.2 LBS | BODY MASS INDEX: 39.89 KG/M2 | DIASTOLIC BLOOD PRESSURE: 80 MMHG | SYSTOLIC BLOOD PRESSURE: 118 MMHG | RESPIRATION RATE: 16 BRPM | HEIGHT: 60 IN | TEMPERATURE: 97.4 F | HEART RATE: 91 BPM

## 2024-11-07 DIAGNOSIS — M79.7 FIBROMYALGIA: ICD-10-CM

## 2024-11-07 DIAGNOSIS — E03.8 OTHER SPECIFIED HYPOTHYROIDISM: ICD-10-CM

## 2024-11-07 DIAGNOSIS — G47.39 OTHER SLEEP APNEA: ICD-10-CM

## 2024-11-07 DIAGNOSIS — G47.09 OTHER INSOMNIA: ICD-10-CM

## 2024-11-07 DIAGNOSIS — E53.8 B12 DEFICIENCY: ICD-10-CM

## 2024-11-07 DIAGNOSIS — G47.19 EXCESSIVE DAYTIME SLEEPINESS: ICD-10-CM

## 2024-11-07 DIAGNOSIS — F41.9 ANXIETY: Primary | ICD-10-CM

## 2024-11-07 PROCEDURE — 90677 PCV20 VACCINE IM: CPT | Performed by: FAMILY MEDICINE

## 2024-11-07 PROCEDURE — 99214 OFFICE O/P EST MOD 30 MIN: CPT | Performed by: FAMILY MEDICINE

## 2024-11-07 PROCEDURE — 90471 IMMUNIZATION ADMIN: CPT | Performed by: FAMILY MEDICINE

## 2024-11-07 PROCEDURE — 96372 THER/PROPH/DIAG INJ SC/IM: CPT | Performed by: FAMILY MEDICINE

## 2024-11-07 RX ORDER — CLONAZEPAM 0.5 MG/1
TABLET ORAL
Qty: 45 TABLET | Refills: 2 | Status: SHIPPED | OUTPATIENT
Start: 2024-11-07 | End: 2024-12-06

## 2024-11-07 RX ORDER — CYANOCOBALAMIN 1000 UG/ML
1000 INJECTION, SOLUTION INTRAMUSCULAR; SUBCUTANEOUS ONCE
Status: COMPLETED | OUTPATIENT
Start: 2024-11-07 | End: 2024-11-07

## 2024-11-07 RX ORDER — TRIAMCINOLONE ACETONIDE 0.25 MG/G
CREAM TOPICAL
Qty: 80 G | Refills: 1 | Status: SHIPPED | OUTPATIENT
Start: 2024-11-07

## 2024-11-07 RX ORDER — MIRTAZAPINE 30 MG/1
30 TABLET, FILM COATED ORAL NIGHTLY
Qty: 30 TABLET | Refills: 5 | Status: SHIPPED | OUTPATIENT
Start: 2024-11-07

## 2024-11-07 RX ORDER — NYSTATIN 100000 [USP'U]/G
POWDER TOPICAL 3 TIMES DAILY PRN
Qty: 1 EACH | Refills: 3 | Status: SHIPPED | OUTPATIENT
Start: 2024-11-07

## 2024-11-07 RX ADMIN — CYANOCOBALAMIN 1000 MCG: 1000 INJECTION, SOLUTION INTRAMUSCULAR; SUBCUTANEOUS at 17:11

## 2024-11-07 SDOH — ECONOMIC STABILITY: FOOD INSECURITY: WITHIN THE PAST 12 MONTHS, YOU WORRIED THAT YOUR FOOD WOULD RUN OUT BEFORE YOU GOT MONEY TO BUY MORE.: NEVER TRUE

## 2024-11-07 SDOH — ECONOMIC STABILITY: FOOD INSECURITY: WITHIN THE PAST 12 MONTHS, THE FOOD YOU BOUGHT JUST DIDN'T LAST AND YOU DIDN'T HAVE MONEY TO GET MORE.: NEVER TRUE

## 2024-11-07 SDOH — ECONOMIC STABILITY: INCOME INSECURITY: HOW HARD IS IT FOR YOU TO PAY FOR THE VERY BASICS LIKE FOOD, HOUSING, MEDICAL CARE, AND HEATING?: NOT VERY HARD

## 2024-11-07 NOTE — PROGRESS NOTES
Immunizations Administered       Name Date Dose Route    Pneumococcal, PCV20, PREVNAR 20, (age 6w+), IM, 0.5mL 11/7/2024 0.5 mL Intramuscular    Site: Deltoid- Left    Lot: [id number]    NDC: 4947-3012-28         Patient tolerated injection well. Patient advised to wait 20 minutes in the office following the injection. No signs/symptoms of reaction noted after 20 minutes.    
\"Have you been to the ER, urgent care clinic since your last visit?  Hospitalized since your last visit?\"    NO    “Have you seen or consulted any other health care providers outside our system since your last visit?”    YES - When: approximately 2  weeks ago.  Where and Why: pain management.      “Have you had a colorectal cancer screening such as a colonoscopy/FIT/Cologuard?    NO    Date of last Colonoscopy: 9/24/2013  No cologuard on file  No FIT/FOBT on file   No flexible sigmoidoscopy on file          Patient tolerated injection well. Patient advised to wait 20 minutes in the office following the injection. No signs/symptoms of reaction noted after 20 minutes.  Administrations This Visit       cyanocobalamin injection 1,000 mcg       Admin Date  11/07/2024  17:11 Action  Given Dose  1,000 mcg Route  IntraMUSCular Site  Deltoid Right Documented By  Asim Garcia LPN    NDC: 56863-641-11    : [employer]    Patient Supplied?: No                    
  Psychiatric/Behavioral:  Negative for agitation and sleep disturbance. The patient is not nervous/anxious.    All other systems reviewed and are negative.       OBJECTIVE:  /80   Pulse 91   Temp 97.4 °F (36.3 °C) (Infrared)   Resp 16   Ht 1.524 m (5')   Wt 92.2 kg (203 lb 3.2 oz)   SpO2 99% Comment: ra  BMI 39.68 kg/m²    Wt Readings from Last 3 Encounters:   11/07/24 92.2 kg (203 lb 3.2 oz)   08/08/24 90.4 kg (199 lb 3.2 oz)   05/17/24 85.7 kg (189 lb)     BP Readings from Last 3 Encounters:   11/07/24 118/80   08/08/24 128/80   05/09/24 130/88      Pulse Readings from Last 3 Encounters:   11/07/24 91   08/08/24 98   05/09/24 (!) 105     Body mass index is 39.68 kg/m².   Resp Readings from Last 3 Encounters:   11/07/24 16   08/08/24 18   05/09/24 16     Past medical, surgical, family and social history were reviewed and updated with the patient.     Physical Exam  Vitals and nursing note reviewed.   Constitutional:       Appearance: Normal appearance. She is well-developed.   HENT:      Head: Normocephalic and atraumatic.      Right Ear: Hearing and external ear normal.      Left Ear: Hearing and external ear normal.      Nose: Nose normal.      Mouth/Throat:      Pharynx: Uvula midline.   Eyes:      General: Lids are normal.      Conjunctiva/sclera: Conjunctivae normal.      Pupils: Pupils are equal, round, and reactive to light.   Neck:      Thyroid: No thyroid mass or thyromegaly.      Vascular: No carotid bruit.      Trachea: Trachea and phonation normal.   Cardiovascular:      Rate and Rhythm: Normal rate and regular rhythm.      Pulses: Normal pulses.      Heart sounds: Normal heart sounds, S1 normal and S2 normal. No murmur heard.     No friction rub. No gallop.   Pulmonary:      Effort: Pulmonary effort is normal.      Breath sounds: Normal breath sounds.   Genitourinary:     Exam position: Supine.      Labia:         Right: No rash, tenderness, lesion or injury.         Left: No rash,

## 2024-11-23 ENCOUNTER — HOSPITAL ENCOUNTER (OUTPATIENT)
Dept: SLEEP CENTER | Facility: HOSPITAL | Age: 49
End: 2024-11-23
Payer: MEDICAID

## 2024-11-23 DIAGNOSIS — G47.39 OTHER SLEEP APNEA: ICD-10-CM

## 2024-11-23 DIAGNOSIS — G47.19 EXCESSIVE DAYTIME SLEEPINESS: ICD-10-CM

## 2024-11-23 PROCEDURE — 95810 POLYSOM 6/> YRS 4/> PARAM: CPT

## 2024-11-25 NOTE — PROCEDURES
Media Information                  Interpretation:     No evidence of sleep related breathing disorder.   Poor sleep efficiency and maintenance.   Obesity.   Snoring present.     Recommendations:    Weight loss and exercise.   Avoid risky activity such as driving if sleepy.   Discuss sleep hygiene with the patient.   Consider ENT evaluation for the snoring.        Marlyn White MD, FCCP, [facility]

## 2024-12-13 DIAGNOSIS — D50.9 IRON DEFICIENCY ANEMIA, UNSPECIFIED IRON DEFICIENCY ANEMIA TYPE: ICD-10-CM

## 2024-12-13 DIAGNOSIS — E66.812 CLASS 2 SEVERE OBESITY DUE TO EXCESS CALORIES WITH SERIOUS COMORBIDITY AND BODY MASS INDEX (BMI) OF 36.0 TO 36.9 IN ADULT: ICD-10-CM

## 2024-12-13 DIAGNOSIS — E66.01 CLASS 2 SEVERE OBESITY DUE TO EXCESS CALORIES WITH SERIOUS COMORBIDITY AND BODY MASS INDEX (BMI) OF 36.0 TO 36.9 IN ADULT: ICD-10-CM

## 2024-12-13 NOTE — TELEPHONE ENCOUNTER
Refill request received from pharmacy      Next Office Visit Date:  No future appointments.    VERA - Please review via PDMP        Last Office Visit:    11/7/2024

## 2024-12-16 RX ORDER — FERROUS SULFATE 325(65) MG
TABLET ORAL
Qty: 90 TABLET | Refills: 0 | Status: SHIPPED | OUTPATIENT
Start: 2024-12-16

## 2024-12-19 RX ORDER — PHENTERMINE HYDROCHLORIDE 37.5 MG/1
37.5 TABLET ORAL DAILY
Qty: 30 TABLET | Refills: 0 | Status: SHIPPED | OUTPATIENT
Start: 2024-12-19 | End: 2025-01-18

## 2025-01-09 DIAGNOSIS — J30.2 SEASONAL ALLERGIES: ICD-10-CM

## 2025-01-13 RX ORDER — LEVOCETIRIZINE DIHYDROCHLORIDE 5 MG/1
TABLET, FILM COATED ORAL
Qty: 90 TABLET | Refills: 0 | Status: SHIPPED | OUTPATIENT
Start: 2025-01-13

## 2025-01-13 RX ORDER — FUROSEMIDE 20 MG/1
TABLET ORAL
Qty: 90 TABLET | Refills: 0 | Status: SHIPPED | OUTPATIENT
Start: 2025-01-13

## 2025-01-13 RX ORDER — MONTELUKAST SODIUM 10 MG/1
TABLET ORAL
Qty: 90 TABLET | Refills: 0 | Status: SHIPPED | OUTPATIENT
Start: 2025-01-13

## 2025-01-13 RX ORDER — BUPROPION HYDROCHLORIDE 300 MG/1
TABLET ORAL
Qty: 90 TABLET | Refills: 0 | Status: SHIPPED | OUTPATIENT
Start: 2025-01-13

## 2025-01-13 NOTE — TELEPHONE ENCOUNTER
Refill request received from pharmacy      Next Office Visit Date:  No future appointments.    VERA - Please review via PDMP        Last Office Visit:    11/7/24

## 2025-01-16 DIAGNOSIS — E66.812 CLASS 2 SEVERE OBESITY DUE TO EXCESS CALORIES WITH SERIOUS COMORBIDITY AND BODY MASS INDEX (BMI) OF 36.0 TO 36.9 IN ADULT: ICD-10-CM

## 2025-01-16 DIAGNOSIS — E66.01 CLASS 2 SEVERE OBESITY DUE TO EXCESS CALORIES WITH SERIOUS COMORBIDITY AND BODY MASS INDEX (BMI) OF 36.0 TO 36.9 IN ADULT: ICD-10-CM

## 2025-01-16 RX ORDER — PHENTERMINE HYDROCHLORIDE 37.5 MG/1
37.5 TABLET ORAL DAILY
Qty: 30 TABLET | Refills: 0 | Status: SHIPPED | OUTPATIENT
Start: 2025-01-16 | End: 2025-02-15

## 2025-01-29 ENCOUNTER — PATIENT MESSAGE (OUTPATIENT)
Age: 50
End: 2025-01-29

## 2025-02-20 ENCOUNTER — OFFICE VISIT (OUTPATIENT)
Age: 50
End: 2025-02-20

## 2025-02-20 ENCOUNTER — HOSPITAL ENCOUNTER (OUTPATIENT)
Facility: HOSPITAL | Age: 50
Discharge: HOME | End: 2025-02-20
Payer: MEDICAID

## 2025-02-20 VITALS — TEMPERATURE: 97.7 F | DIASTOLIC BLOOD PRESSURE: 80 MMHG | SYSTOLIC BLOOD PRESSURE: 130 MMHG | HEART RATE: 87 BPM

## 2025-02-20 DIAGNOSIS — K21.9 GASTROESOPHAGEAL REFLUX DISEASE WITHOUT ESOPHAGITIS: ICD-10-CM

## 2025-02-20 DIAGNOSIS — D50.9 IRON DEFICIENCY ANEMIA, UNSPECIFIED IRON DEFICIENCY ANEMIA TYPE: ICD-10-CM

## 2025-02-20 DIAGNOSIS — M79.642 LEFT HAND PAIN: ICD-10-CM

## 2025-02-20 DIAGNOSIS — E03.9 HYPOTHYROIDISM, UNSPECIFIED TYPE: ICD-10-CM

## 2025-02-20 DIAGNOSIS — F41.9 ANXIETY: ICD-10-CM

## 2025-02-20 DIAGNOSIS — J30.2 SEASONAL ALLERGIES: ICD-10-CM

## 2025-02-20 DIAGNOSIS — J44.1 COPD EXACERBATION (HCC): Primary | ICD-10-CM

## 2025-02-20 DIAGNOSIS — G47.09 OTHER INSOMNIA: ICD-10-CM

## 2025-02-20 DIAGNOSIS — M79.7 FIBROMYALGIA: ICD-10-CM

## 2025-02-20 DIAGNOSIS — E53.8 B12 DEFICIENCY: ICD-10-CM

## 2025-02-20 LAB
ALBUMIN SERPL-MCNC: 4.1 G/DL (ref 3.4–4.8)
ALBUMIN/GLOB SERPL: 1.4 {RATIO} (ref 0.8–2)
ALP SERPL-CCNC: 163 U/L (ref 25–100)
ALT SERPL-CCNC: 17 U/L (ref 4–36)
ANION GAP SERPL CALCULATED.3IONS-SCNC: 13 MMOL/L (ref 3–16)
AST SERPL-CCNC: 32 U/L (ref 8–33)
BILIRUB SERPL-MCNC: 0.3 MG/DL (ref 0.3–1.2)
BUN SERPL-MCNC: 15 MG/DL (ref 6–20)
CALCIUM SERPL-MCNC: 9.6 MG/DL (ref 8.3–10.6)
CHLORIDE SERPL-SCNC: 101 MMOL/L (ref 98–107)
CO2 SERPL-SCNC: 25 MMOL/L (ref 20–30)
CREAT SERPL-MCNC: 0.7 MG/DL (ref 0.4–1.2)
ERYTHROCYTE [DISTWIDTH] IN BLOOD BY AUTOMATED COUNT: 12.7 % (ref 11–16)
GFR SERPLBLD CREATININE-BSD FMLA CKD-EPI: >90 ML/MIN/{1.73_M2}
GLOBULIN SER CALC-MCNC: 3 G/DL
GLUCOSE SERPL-MCNC: 81 MG/DL (ref 74–106)
HCT VFR BLD AUTO: 46.1 % (ref 37–47)
HGB BLD-MCNC: 15.6 G/DL (ref 11.5–16.5)
IRON SATN MFR SERPL: 39 % (ref 15–50)
IRON SERPL-MCNC: 94 UG/DL (ref 37–145)
MCH RBC QN AUTO: 32.3 PG (ref 27–32)
MCHC RBC AUTO-ENTMCNC: 33.8 G/DL (ref 31–35)
MCV RBC AUTO: 95.4 FL (ref 80–100)
PLATELET # BLD AUTO: 317 K/UL (ref 150–400)
PMV BLD AUTO: 9.9 FL (ref 6–10)
POTASSIUM SERPL-SCNC: 4.6 MMOL/L (ref 3.4–5.1)
PROT SERPL-MCNC: 7.1 G/DL (ref 6.4–8.3)
RBC # BLD AUTO: 4.83 M/UL (ref 3.8–5.8)
SODIUM SERPL-SCNC: 139 MMOL/L (ref 136–145)
T4 FREE SERPL-MCNC: 1.8 NG/DL (ref 0.92–1.68)
TIBC SERPL-MCNC: 240 UG/DL (ref 250–450)
TSH SERPL DL<=0.005 MIU/L-ACNC: 3.1 UIU/ML (ref 0.27–4.2)
WBC # BLD AUTO: 15.3 K/UL (ref 4–11)

## 2025-02-20 PROCEDURE — 80053 COMPREHEN METABOLIC PANEL: CPT

## 2025-02-20 PROCEDURE — 83540 ASSAY OF IRON: CPT

## 2025-02-20 PROCEDURE — 85027 COMPLETE CBC AUTOMATED: CPT

## 2025-02-20 PROCEDURE — 36415 COLL VENOUS BLD VENIPUNCTURE: CPT

## 2025-02-20 PROCEDURE — 84439 ASSAY OF FREE THYROXINE: CPT

## 2025-02-20 PROCEDURE — 84443 ASSAY THYROID STIM HORMONE: CPT

## 2025-02-20 PROCEDURE — 83550 IRON BINDING TEST: CPT

## 2025-02-20 RX ORDER — LEVOFLOXACIN 500 MG/1
500 TABLET, FILM COATED ORAL DAILY
Qty: 7 TABLET | Refills: 0 | Status: SHIPPED | OUTPATIENT
Start: 2025-02-20 | End: 2025-02-27

## 2025-02-20 RX ORDER — FERROUS SULFATE 325(65) MG
325 TABLET ORAL
Qty: 90 TABLET | Refills: 0 | Status: SHIPPED | OUTPATIENT
Start: 2025-02-20

## 2025-02-20 RX ORDER — MONTELUKAST SODIUM 10 MG/1
TABLET ORAL
Qty: 90 TABLET | Refills: 0 | Status: SHIPPED | OUTPATIENT
Start: 2025-02-20

## 2025-02-20 RX ORDER — DEXLANSOPRAZOLE 60 MG/1
CAPSULE, DELAYED RELEASE ORAL
Qty: 90 CAPSULE | Refills: 3 | Status: SHIPPED | OUTPATIENT
Start: 2025-02-20

## 2025-02-20 RX ORDER — CLONAZEPAM 0.5 MG/1
TABLET ORAL
Qty: 45 TABLET | Refills: 2 | Status: SHIPPED | OUTPATIENT
Start: 2025-02-20 | End: 2025-03-21

## 2025-02-20 RX ORDER — FUROSEMIDE 20 MG/1
TABLET ORAL
Qty: 90 TABLET | Refills: 0 | Status: SHIPPED | OUTPATIENT
Start: 2025-02-20

## 2025-02-20 RX ORDER — ALBUTEROL SULFATE 90 UG/1
2 INHALANT RESPIRATORY (INHALATION) EVERY 4 HOURS PRN
Qty: 18 G | Refills: 0 | Status: SHIPPED | OUTPATIENT
Start: 2025-02-20

## 2025-02-20 RX ORDER — NAPROXEN 500 MG/1
TABLET ORAL
Qty: 180 TABLET | Refills: 3 | Status: SHIPPED | OUTPATIENT
Start: 2025-02-20

## 2025-02-20 RX ORDER — MIRTAZAPINE 30 MG/1
30 TABLET, FILM COATED ORAL NIGHTLY
Qty: 30 TABLET | Refills: 5 | Status: SHIPPED | OUTPATIENT
Start: 2025-02-20

## 2025-02-20 RX ORDER — EPINEPHRINE 0.3 MG/.3ML
INJECTION SUBCUTANEOUS
Qty: 1 EACH | Refills: 3 | Status: SHIPPED | OUTPATIENT
Start: 2025-02-20

## 2025-02-20 RX ORDER — LEVOCETIRIZINE DIHYDROCHLORIDE 5 MG/1
TABLET, FILM COATED ORAL
Qty: 90 TABLET | Refills: 0 | Status: SHIPPED | OUTPATIENT
Start: 2025-02-20

## 2025-02-20 RX ORDER — CYANOCOBALAMIN 1000 UG/ML
1000 INJECTION, SOLUTION INTRAMUSCULAR; SUBCUTANEOUS ONCE
Status: COMPLETED | OUTPATIENT
Start: 2025-02-20 | End: 2025-02-20

## 2025-02-20 RX ORDER — LEVOTHYROXINE SODIUM 112 UG/1
TABLET ORAL
Qty: 90 TABLET | Refills: 3 | Status: SHIPPED | OUTPATIENT
Start: 2025-02-20

## 2025-02-20 RX ORDER — BUPROPION HYDROCHLORIDE 300 MG/1
TABLET ORAL
Qty: 90 TABLET | Refills: 0 | Status: SHIPPED | OUTPATIENT
Start: 2025-02-20

## 2025-02-20 RX ORDER — ONDANSETRON 8 MG/1
8 TABLET, ORALLY DISINTEGRATING ORAL EVERY 12 HOURS PRN
COMMUNITY
Start: 2025-01-09

## 2025-02-20 RX ORDER — TRIAMCINOLONE ACETONIDE 0.25 MG/G
CREAM TOPICAL
Qty: 80 G | Refills: 1 | Status: SHIPPED | OUTPATIENT
Start: 2025-02-20

## 2025-02-20 RX ORDER — IPRATROPIUM BROMIDE AND ALBUTEROL SULFATE 2.5; .5 MG/3ML; MG/3ML
1 SOLUTION RESPIRATORY (INHALATION) EVERY 4 HOURS PRN
Qty: 540 ML | Refills: 2 | Status: SHIPPED | OUTPATIENT
Start: 2025-02-20 | End: 2025-05-21

## 2025-02-20 RX ORDER — FLUTICASONE PROPIONATE 50 MCG
SPRAY, SUSPENSION (ML) NASAL
Qty: 16 G | Refills: 5 | Status: SHIPPED | OUTPATIENT
Start: 2025-02-20

## 2025-02-20 RX ORDER — NYSTATIN 100000 [USP'U]/G
POWDER TOPICAL 3 TIMES DAILY PRN
Qty: 1 EACH | Refills: 3 | Status: SHIPPED | OUTPATIENT
Start: 2025-02-20

## 2025-02-20 RX ORDER — FAMOTIDINE 40 MG/1
TABLET, FILM COATED ORAL
Qty: 180 TABLET | Refills: 3 | Status: SHIPPED | OUTPATIENT
Start: 2025-02-20

## 2025-02-20 RX ADMIN — CYANOCOBALAMIN 1000 MCG: 1000 INJECTION, SOLUTION INTRAMUSCULAR; SUBCUTANEOUS at 16:02

## 2025-02-20 SDOH — ECONOMIC STABILITY: FOOD INSECURITY: WITHIN THE PAST 12 MONTHS, YOU WORRIED THAT YOUR FOOD WOULD RUN OUT BEFORE YOU GOT MONEY TO BUY MORE.: NEVER TRUE

## 2025-02-20 SDOH — ECONOMIC STABILITY: FOOD INSECURITY: WITHIN THE PAST 12 MONTHS, THE FOOD YOU BOUGHT JUST DIDN'T LAST AND YOU DIDN'T HAVE MONEY TO GET MORE.: NEVER TRUE

## 2025-02-20 ASSESSMENT — PATIENT HEALTH QUESTIONNAIRE - PHQ9
SUM OF ALL RESPONSES TO PHQ QUESTIONS 1-9: 22
1. LITTLE INTEREST OR PLEASURE IN DOING THINGS: NEARLY EVERY DAY
SUM OF ALL RESPONSES TO PHQ QUESTIONS 1-9: 22
6. FEELING BAD ABOUT YOURSELF - OR THAT YOU ARE A FAILURE OR HAVE LET YOURSELF OR YOUR FAMILY DOWN: SEVERAL DAYS
2. FEELING DOWN, DEPRESSED OR HOPELESS: NEARLY EVERY DAY
SUM OF ALL RESPONSES TO PHQ QUESTIONS 1-9: 22
7. TROUBLE CONCENTRATING ON THINGS, SUCH AS READING THE NEWSPAPER OR WATCHING TELEVISION: NEARLY EVERY DAY
5. POOR APPETITE OR OVEREATING: NEARLY EVERY DAY
10. IF YOU CHECKED OFF ANY PROBLEMS, HOW DIFFICULT HAVE THESE PROBLEMS MADE IT FOR YOU TO DO YOUR WORK, TAKE CARE OF THINGS AT HOME, OR GET ALONG WITH OTHER PEOPLE: SOMEWHAT DIFFICULT
SUM OF ALL RESPONSES TO PHQ9 QUESTIONS 1 & 2: 6
SUM OF ALL RESPONSES TO PHQ QUESTIONS 1-9: 22
8. MOVING OR SPEAKING SO SLOWLY THAT OTHER PEOPLE COULD HAVE NOTICED. OR THE OPPOSITE, BEING SO FIGETY OR RESTLESS THAT YOU HAVE BEEN MOVING AROUND A LOT MORE THAN USUAL: NEARLY EVERY DAY
9. THOUGHTS THAT YOU WOULD BE BETTER OFF DEAD, OR OF HURTING YOURSELF: NOT AT ALL
4. FEELING TIRED OR HAVING LITTLE ENERGY: NEARLY EVERY DAY
3. TROUBLE FALLING OR STAYING ASLEEP: NEARLY EVERY DAY

## 2025-02-20 ASSESSMENT — ENCOUNTER SYMPTOMS
SHORTNESS OF BREATH: 0
COUGH: 0
EYE REDNESS: 0
EYE PAIN: 0
VOMITING: 0
ABDOMINAL PAIN: 0
COLOR CHANGE: 0
DIARRHEA: 0
SORE THROAT: 0
NAUSEA: 0
BACK PAIN: 1
CHEST TIGHTNESS: 0
TROUBLE SWALLOWING: 0

## 2025-02-20 ASSESSMENT — COLUMBIA-SUICIDE SEVERITY RATING SCALE - C-SSRS
2. HAVE YOU ACTUALLY HAD ANY THOUGHTS OF KILLING YOURSELF?: NO
1. WITHIN THE PAST MONTH, HAVE YOU WISHED YOU WERE DEAD OR WISHED YOU COULD GO TO SLEEP AND NOT WAKE UP?: NO
6. HAVE YOU EVER DONE ANYTHING, STARTED TO DO ANYTHING, OR PREPARED TO DO ANYTHING TO END YOUR LIFE?: NO

## 2025-02-20 NOTE — PROGRESS NOTES
\"Have you been to the ER, urgent care clinic since your last visit?  Hospitalized since your last visit?\"    NO    “Have you seen or consulted any other health care providers outside our system since your last visit?”    Yes Pain Management      “Have you had a colorectal cancer screening such as a colonoscopy/FIT/Cologuard?    NO    Date of last Colonoscopy: 9/24/2013  No cologuard on file  No FIT/FOBT on file   No flexible sigmoidoscopy on file      Patient tolerated injection well. Patient advised to wait 20 minutes in the office following the injection. No signs/symptoms of reaction noted after 20 minutes.

## 2025-02-20 NOTE — PROGRESS NOTES
SUBJECTIVE:    Patient ID: Liz Haywood is a 50 y.o. female.    Chief Complaint   Patient presents with    COPD     Patient is having breathing at times with coughing.     Hypothyroidism     Reg F/U       HPI: office visit  History of Present Illness  The patient presents for evaluation of suspected pneumonia, choking sensation, and anxiety.    She suspects a diagnosis of pneumonia, despite having received a pneumonia vaccine a few months prior. She reports experiencing deep chest pain, wheezing, and a productive cough that is most severe at night, often disrupting her sleep. The expectorated sputum is described as yellowish-green. Wheezing is constant throughout the day and night. She also notes an increase in shortness of breath compared to her baseline. She reports no nasal or ear symptoms, sore throat, influenza-like symptoms, fever, nausea, or vomiting. These symptoms have been present for approximately 2 to 3 weeks and have been progressively worsening, with an increase in phlegm production and wheezing. She does not experience chest tightness but reports a sensation of congestion. She reports no unusual swelling in her feet or ankles. Initially, she experienced rib pain due to coughing. She has been unable to use her nebulizer due to a malfunction and has been using a pediatric nebulizer as an alternative. She reports no sneezing or reflux but admits to occasional choking. She reports no adverse reactions to any medications. She has an albuterol inhaler at home.    She believes her thyroid may be swollen, which could be contributing to the choking sensation.    Her anxiety medication appears to be effective. Her sleep quality is good when not disrupted by coughing.    Supplemental Information  She has been taking iron supplements without any associated constipation. Diuretics have been effective in managing her swelling, and she reports no issues with fluid accumulation.    MEDICATIONS  Current: albuterol

## 2025-03-03 ENCOUNTER — PATIENT MESSAGE (OUTPATIENT)
Age: 50
End: 2025-03-03

## 2025-03-03 DIAGNOSIS — E66.812 CLASS 2 SEVERE OBESITY DUE TO EXCESS CALORIES WITH SERIOUS COMORBIDITY AND BODY MASS INDEX (BMI) OF 36.0 TO 36.9 IN ADULT: ICD-10-CM

## 2025-03-03 DIAGNOSIS — E66.01 CLASS 2 SEVERE OBESITY DUE TO EXCESS CALORIES WITH SERIOUS COMORBIDITY AND BODY MASS INDEX (BMI) OF 36.0 TO 36.9 IN ADULT: ICD-10-CM

## 2025-03-03 RX ORDER — PHENTERMINE HYDROCHLORIDE 37.5 MG/1
37.5 TABLET ORAL DAILY
Qty: 30 TABLET | Refills: 2 | Status: SHIPPED | OUTPATIENT
Start: 2025-03-03 | End: 2025-06-01

## 2025-03-03 NOTE — TELEPHONE ENCOUNTER
Refill request received from pharmacy      Next Office Visit Date:  Future Appointments   Date Time Provider Department Center   5/20/2025  2:00 PM Jordyn Serra MD [facility] - Please review via PDMP        Last Office Visit:    2/20/2025

## 2025-03-06 DIAGNOSIS — D50.9 IRON DEFICIENCY ANEMIA, UNSPECIFIED IRON DEFICIENCY ANEMIA TYPE: Primary | ICD-10-CM

## 2025-03-26 RX ORDER — TRIAMCINOLONE ACETONIDE 0.25 MG/G
CREAM TOPICAL
Qty: 80 G | Refills: 1 | Status: SHIPPED | OUTPATIENT
Start: 2025-03-26

## 2025-03-26 NOTE — TELEPHONE ENCOUNTER
Refill request received from pharmacy      Next Office Visit Date:  Future Appointments   Date Time Provider Department Center   5/20/2025  2:00 PM [facility]ordyn Serra MD [facility] - Please review via PDMP        Last Office Visit:    2/20/2025

## 2025-04-03 ENCOUNTER — HOSPITAL ENCOUNTER (OUTPATIENT)
Facility: HOSPITAL | Age: 50
Discharge: HOME | End: 2025-04-03
Payer: MEDICAID

## 2025-04-03 DIAGNOSIS — D50.9 IRON DEFICIENCY ANEMIA, UNSPECIFIED IRON DEFICIENCY ANEMIA TYPE: ICD-10-CM

## 2025-04-03 LAB
BASOPHILS # BLD: 0.1 K/UL (ref 0–0.1)
BASOPHILS NFR BLD: 0.8 %
EOSINOPHIL # BLD: 0.2 K/UL (ref 0–0.4)
EOSINOPHIL NFR BLD: 2.4 %
ERYTHROCYTE [DISTWIDTH] IN BLOOD BY AUTOMATED COUNT: 13.1 % (ref 11–16)
HCT VFR BLD AUTO: 47.7 % (ref 37–47)
HGB BLD-MCNC: 15.7 G/DL (ref 11.5–16.5)
IMM GRANULOCYTES # BLD: 0 K/UL
IMM GRANULOCYTES NFR BLD: 0.3 % (ref 0–5)
LYMPHOCYTES # BLD: 3.6 K/UL (ref 1.5–4)
LYMPHOCYTES NFR BLD: 40.9 %
MCH RBC QN AUTO: 31.8 PG (ref 27–32)
MCHC RBC AUTO-ENTMCNC: 32.9 G/DL (ref 31–35)
MCV RBC AUTO: 96.6 FL (ref 80–100)
MONOCYTES # BLD: 0.5 K/UL (ref 0.2–0.8)
MONOCYTES NFR BLD: 5.2 %
NEUTROPHILS # BLD: 4.5 K/UL (ref 2–7.5)
NEUTS SEG NFR BLD: 50.4 %
PLATELET # BLD AUTO: 263 K/UL (ref 150–400)
PMV BLD AUTO: 10.5 FL (ref 6–10)
RBC # BLD AUTO: 4.94 M/UL (ref 3.8–5.8)
WBC # BLD AUTO: 8.9 K/UL (ref 4–11)

## 2025-04-03 PROCEDURE — 85025 COMPLETE CBC W/AUTO DIFF WBC: CPT

## 2025-04-04 ENCOUNTER — RESULTS FOLLOW-UP (OUTPATIENT)
Age: 50
End: 2025-04-04

## 2025-04-05 DIAGNOSIS — M79.7 FIBROMYALGIA: ICD-10-CM

## 2025-04-05 DIAGNOSIS — M79.642 LEFT HAND PAIN: ICD-10-CM

## 2025-04-07 NOTE — TELEPHONE ENCOUNTER
Refill request received from pharmacy      Next Office Visit Date:  Future Appointments   Date Time Provider Department Center   5/20/2025  2:00 PM [facility] Ser[facility] - Please review via PDMP        Last Office Visit:    2/20/2025

## 2025-04-10 DIAGNOSIS — G47.09 OTHER INSOMNIA: ICD-10-CM

## 2025-04-10 RX ORDER — MIRTAZAPINE 30 MG/1
30 TABLET, FILM COATED ORAL NIGHTLY
Qty: 30 TABLET | Refills: 5 | OUTPATIENT
Start: 2025-04-10

## 2025-05-18 DIAGNOSIS — F41.9 ANXIETY: ICD-10-CM

## 2025-05-19 RX ORDER — CLONAZEPAM 0.5 MG/1
TABLET ORAL
Qty: 45 TABLET | Refills: 2 | Status: SHIPPED | OUTPATIENT
Start: 2025-05-19 | End: 2025-06-19

## 2025-05-19 NOTE — TELEPHONE ENCOUNTER
Refill request received from pharmacy      Next Office Visit Date:  Future Appointments   Date Time Provider Department Center   5/20/2025  2:00 PM Jordyn Serra MD [facility] - Please review via PDMP        Last Office Visit:    2/20/2025

## 2025-05-20 ENCOUNTER — HOSPITAL ENCOUNTER (OUTPATIENT)
Facility: HOSPITAL | Age: 50
Discharge: HOME | End: 2025-05-20
Payer: MEDICAID

## 2025-05-20 ENCOUNTER — OFFICE VISIT (OUTPATIENT)
Age: 50
End: 2025-05-20
Payer: MEDICAID

## 2025-05-20 VITALS
OXYGEN SATURATION: 99 % | HEART RATE: 93 BPM | RESPIRATION RATE: 18 BRPM | TEMPERATURE: 97.3 F | SYSTOLIC BLOOD PRESSURE: 118 MMHG | WEIGHT: 197.2 LBS | DIASTOLIC BLOOD PRESSURE: 72 MMHG | HEIGHT: 60 IN | BODY MASS INDEX: 38.72 KG/M2

## 2025-05-20 DIAGNOSIS — G89.29 CHRONIC LOW BACK PAIN WITH LEFT-SIDED SCIATICA, UNSPECIFIED BACK PAIN LATERALITY: ICD-10-CM

## 2025-05-20 DIAGNOSIS — E03.9 HYPOTHYROIDISM, UNSPECIFIED TYPE: ICD-10-CM

## 2025-05-20 DIAGNOSIS — M25.562 CHRONIC PAIN OF LEFT KNEE: Primary | ICD-10-CM

## 2025-05-20 DIAGNOSIS — F32.89 OTHER DEPRESSION: ICD-10-CM

## 2025-05-20 DIAGNOSIS — M54.42 CHRONIC LOW BACK PAIN WITH LEFT-SIDED SCIATICA, UNSPECIFIED BACK PAIN LATERALITY: ICD-10-CM

## 2025-05-20 DIAGNOSIS — G47.09 OTHER INSOMNIA: ICD-10-CM

## 2025-05-20 DIAGNOSIS — Z12.39 ENCOUNTER FOR SCREENING FOR MALIGNANT NEOPLASM OF BREAST, UNSPECIFIED SCREENING MODALITY: ICD-10-CM

## 2025-05-20 DIAGNOSIS — J43.8 OTHER EMPHYSEMA (HCC): ICD-10-CM

## 2025-05-20 DIAGNOSIS — J30.2 SEASONAL ALLERGIES: ICD-10-CM

## 2025-05-20 DIAGNOSIS — K21.9 GASTROESOPHAGEAL REFLUX DISEASE, UNSPECIFIED WHETHER ESOPHAGITIS PRESENT: ICD-10-CM

## 2025-05-20 DIAGNOSIS — M79.7 FIBROMYALGIA: ICD-10-CM

## 2025-05-20 DIAGNOSIS — Z13.220 SCREENING, LIPID: ICD-10-CM

## 2025-05-20 DIAGNOSIS — D50.9 IRON DEFICIENCY ANEMIA, UNSPECIFIED IRON DEFICIENCY ANEMIA TYPE: ICD-10-CM

## 2025-05-20 DIAGNOSIS — G89.29 CHRONIC PAIN OF LEFT KNEE: Primary | ICD-10-CM

## 2025-05-20 PROCEDURE — 99214 OFFICE O/P EST MOD 30 MIN: CPT | Performed by: FAMILY MEDICINE

## 2025-05-20 PROCEDURE — 80061 LIPID PANEL: CPT

## 2025-05-20 PROCEDURE — 84443 ASSAY THYROID STIM HORMONE: CPT

## 2025-05-20 PROCEDURE — 36415 COLL VENOUS BLD VENIPUNCTURE: CPT

## 2025-05-20 PROCEDURE — 85027 COMPLETE CBC AUTOMATED: CPT

## 2025-05-20 PROCEDURE — 84439 ASSAY OF FREE THYROXINE: CPT

## 2025-05-20 PROCEDURE — 80053 COMPREHEN METABOLIC PANEL: CPT

## 2025-05-20 RX ORDER — FERROUS SULFATE 325(65) MG
325 TABLET ORAL
Qty: 90 TABLET | Refills: 0 | Status: SHIPPED | OUTPATIENT
Start: 2025-05-20

## 2025-05-20 ASSESSMENT — ENCOUNTER SYMPTOMS
DIARRHEA: 0
TROUBLE SWALLOWING: 0
ABDOMINAL PAIN: 0
COUGH: 0
VOMITING: 0
BACK PAIN: 1
NAUSEA: 0
COLOR CHANGE: 0
EYE PAIN: 0
EYE REDNESS: 0
SORE THROAT: 0
SHORTNESS OF BREATH: 0
CHEST TIGHTNESS: 0

## 2025-05-20 NOTE — PROGRESS NOTES
SUBJECTIVE:    Patient ID: Liz Haywood is a 50 y.o. female.    Chief Complaint   Patient presents with    COPD     Reg follow up     Anxiety     Reg follow up     Other     Needs to do UDS        HPI: office visit  History of Present Illness  The patient presents for evaluation of left knee pain, low back pain, and health maintenance.    She reports persistent pain in her left knee, which she describes as being located centrally and extending to the sides. The pain is severe enough to disrupt her sleep and has been progressively worsening. She experiences a popping sensation in her knee during ambulation, accompanied by a grinding feeling and pain. Tenderness is noted upon palpation of the kneecap. There has been no recent injury or trauma to the knee. The knee does not exhibit any signs of inflammation such as redness or heat.    She continues to experience pain in the left side of her lower back, which has been attributed to nerve involvement.    She has received a notification from the hospital indicating that it is time to schedule her next mammogram. She reports no shortness of breath or falls. Her mood remains stable. She is not experiencing heartburn and reports that her sleep quality is good, aided by her current medication regimen.    FAMILY HISTORY  Her father had knee surgery. Her mother had knee surgery after falling and breaking her kneecap.        Review of Systems   Constitutional:  Negative for activity change, appetite change, chills and fever.   HENT:  Negative for congestion, ear pain, nosebleeds, sore throat and trouble swallowing.    Eyes:  Negative for pain and redness.   Respiratory:  Negative for cough, chest tightness and shortness of breath.    Cardiovascular:  Negative for chest pain, palpitations and leg swelling.   Gastrointestinal:  Negative for abdominal pain, diarrhea, nausea and vomiting.   Genitourinary:  Negative for difficulty urinating, dysuria and flank pain.

## 2025-05-20 NOTE — PROGRESS NOTES
Chief Complaint   Patient presents with    COPD     Reg follow up     Anxiety     Reg follow up        Have you seen any other physician or provider since your last visit yes - pain clinic     Have you had any other diagnostic tests since your last visit? yes - labs    Have you changed or stopped any medications since your last visit? no

## 2025-05-21 LAB
ALBUMIN SERPL-MCNC: 4.1 G/DL (ref 3.4–4.8)
ALBUMIN/GLOB SERPL: 1.6 {RATIO} (ref 0.8–2)
ALP SERPL-CCNC: 131 U/L (ref 25–100)
ALT SERPL-CCNC: 12 U/L (ref 4–36)
ANION GAP SERPL CALCULATED.3IONS-SCNC: 9 MMOL/L (ref 3–16)
AST SERPL-CCNC: 19 U/L (ref 8–33)
BILIRUB SERPL-MCNC: <0.2 MG/DL (ref 0.3–1.2)
BUN SERPL-MCNC: 17 MG/DL (ref 6–20)
CALCIUM SERPL-MCNC: 9.2 MG/DL (ref 8.3–10.6)
CHLORIDE SERPL-SCNC: 105 MMOL/L (ref 98–107)
CHOLEST SERPL-MCNC: 218 MG/DL (ref 0–200)
CO2 SERPL-SCNC: 27 MMOL/L (ref 20–30)
CREAT SERPL-MCNC: 0.8 MG/DL (ref 0.6–1.1)
ERYTHROCYTE [DISTWIDTH] IN BLOOD BY AUTOMATED COUNT: 12.9 % (ref 11–16)
GFR SERPLBLD CREATININE-BSD FMLA CKD-EPI: >90 ML/MIN/{1.73_M2}
GLOBULIN SER CALC-MCNC: 2.6 G/DL
GLUCOSE SERPL-MCNC: 96 MG/DL (ref 74–106)
HCT VFR BLD AUTO: 46.5 % (ref 37–47)
HDLC SERPL-MCNC: 42 MG/DL (ref 40–60)
HGB BLD-MCNC: 15.3 G/DL (ref 11.5–16.5)
LDLC SERPL CALC-MCNC: 126 MG/DL
MCH RBC QN AUTO: 32 PG (ref 27–32)
MCHC RBC AUTO-ENTMCNC: 32.9 G/DL (ref 31–35)
MCV RBC AUTO: 97.3 FL (ref 80–100)
PLATELET # BLD AUTO: 258 K/UL (ref 150–400)
PMV BLD AUTO: 10.9 FL (ref 6–10)
POTASSIUM SERPL-SCNC: 4.2 MMOL/L (ref 3.4–5.1)
PROT SERPL-MCNC: 6.7 G/DL (ref 6.4–8.3)
RBC # BLD AUTO: 4.78 M/UL (ref 3.8–5.8)
SODIUM SERPL-SCNC: 141 MMOL/L (ref 136–145)
T4 FREE SERPL-MCNC: 1.9 NG/DL (ref 0.92–1.68)
TRIGL SERPL-MCNC: 252 MG/DL (ref 0–249)
TSH SERPL DL<=0.005 MIU/L-ACNC: 0.95 UIU/ML (ref 0.27–4.2)
VLDLC SERPL CALC-MCNC: 50 MG/DL
WBC # BLD AUTO: 8.8 K/UL (ref 4–11)

## 2025-05-26 DIAGNOSIS — E66.812 CLASS 2 SEVERE OBESITY DUE TO EXCESS CALORIES WITH SERIOUS COMORBIDITY AND BODY MASS INDEX (BMI) OF 36.0 TO 36.9 IN ADULT (HCC): ICD-10-CM

## 2025-05-26 DIAGNOSIS — E66.01 CLASS 2 SEVERE OBESITY DUE TO EXCESS CALORIES WITH SERIOUS COMORBIDITY AND BODY MASS INDEX (BMI) OF 36.0 TO 36.9 IN ADULT (HCC): ICD-10-CM

## 2025-05-27 RX ORDER — PHENTERMINE HYDROCHLORIDE 37.5 MG/1
37.5 TABLET ORAL DAILY
Qty: 30 TABLET | Refills: 2 | Status: SHIPPED | OUTPATIENT
Start: 2025-05-27 | End: 2025-08-25

## 2025-05-27 NOTE — TELEPHONE ENCOUNTER
Refill request received from pharmacy      Next Office Visit Date:  Future Appointments   Date Time Provider Department Center   6/11/2025  2:00 PM [facility] MAMMO 1 [facility] MAMMO [facility] Rad   8/20/2025  1:45 PM Jordyn Serra MD [facility] TINO GAMEZ - Please review via PDMP        Last Office Visit:    5/20/2025

## 2025-06-04 ENCOUNTER — LAB (OUTPATIENT)
Age: 50
End: 2025-06-04
Payer: MEDICAID

## 2025-06-04 DIAGNOSIS — E53.8 B12 DEFICIENCY: Primary | ICD-10-CM

## 2025-06-04 DIAGNOSIS — J30.2 SEASONAL ALLERGIES: ICD-10-CM

## 2025-06-04 PROCEDURE — 96372 THER/PROPH/DIAG INJ SC/IM: CPT | Performed by: FAMILY MEDICINE

## 2025-06-04 RX ORDER — LEVOCETIRIZINE DIHYDROCHLORIDE 5 MG/1
TABLET, FILM COATED ORAL
Qty: 90 TABLET | Refills: 3 | Status: SHIPPED | OUTPATIENT
Start: 2025-06-04

## 2025-06-04 RX ORDER — CYANOCOBALAMIN 1000 UG/ML
1000 INJECTION, SOLUTION INTRAMUSCULAR; SUBCUTANEOUS ONCE
Status: COMPLETED | OUTPATIENT
Start: 2025-06-04 | End: 2025-06-04

## 2025-06-04 RX ADMIN — CYANOCOBALAMIN 1000 MCG: 1000 INJECTION, SOLUTION INTRAMUSCULAR; SUBCUTANEOUS at 11:06

## 2025-06-04 NOTE — PROGRESS NOTES
Patient tolerated injection well. Patient advised to wait 20 minutes in the office following the injection. No signs/symptoms of reaction noted after 20 minutes.

## 2025-06-11 ENCOUNTER — HOSPITAL ENCOUNTER (OUTPATIENT)
Dept: MAMMOGRAPHY | Facility: HOSPITAL | Age: 50
Discharge: HOME | End: 2025-06-11
Payer: MEDICAID

## 2025-06-11 DIAGNOSIS — Z12.31 ENCOUNTER FOR SCREENING MAMMOGRAM FOR BREAST CANCER: Primary | ICD-10-CM

## 2025-06-11 DIAGNOSIS — Z12.31 ENCOUNTER FOR SCREENING MAMMOGRAM FOR BREAST CANCER: ICD-10-CM

## 2025-06-11 PROCEDURE — 77063 BREAST TOMOSYNTHESIS BI: CPT

## 2025-06-12 DIAGNOSIS — M79.7 FIBROMYALGIA: ICD-10-CM

## 2025-06-12 DIAGNOSIS — M79.642 LEFT HAND PAIN: ICD-10-CM

## 2025-06-12 NOTE — TELEPHONE ENCOUNTER
Refill request received from pharmacy      Next Office Visit Date:  Future Appointments   Date Time Provider Department Center   8/20/2025  1:45 PM Jordyn Serra MD [facility] - Please review via PDMP        Last Office Visit:    5/20/2025

## 2025-06-16 ENCOUNTER — PATIENT MESSAGE (OUTPATIENT)
Age: 50
End: 2025-06-16

## 2025-06-18 ENCOUNTER — RESULTS FOLLOW-UP (OUTPATIENT)
Age: 50
End: 2025-06-18

## 2025-07-22 DIAGNOSIS — E66.812 CLASS 2 SEVERE OBESITY DUE TO EXCESS CALORIES WITH SERIOUS COMORBIDITY AND BODY MASS INDEX (BMI) OF 36.0 TO 36.9 IN ADULT (HCC): ICD-10-CM

## 2025-07-22 DIAGNOSIS — E66.01 CLASS 2 SEVERE OBESITY DUE TO EXCESS CALORIES WITH SERIOUS COMORBIDITY AND BODY MASS INDEX (BMI) OF 36.0 TO 36.9 IN ADULT (HCC): ICD-10-CM

## 2025-07-22 RX ORDER — PHENTERMINE HYDROCHLORIDE 37.5 MG/1
37.5 TABLET ORAL DAILY
Qty: 30 TABLET | Refills: 2 | Status: SHIPPED | OUTPATIENT
Start: 2025-07-22 | End: 2025-10-20

## 2025-07-22 NOTE — TELEPHONE ENCOUNTER
Refill request received from pharmacy      Next Office Visit Date:  Future Appointments   Date Time Provider Department Center   8/20/2025  1:45 PM [facility] Please review via PDMP        Last Office Visit:    5/20/2025

## 2025-08-18 DIAGNOSIS — D50.9 IRON DEFICIENCY ANEMIA, UNSPECIFIED IRON DEFICIENCY ANEMIA TYPE: ICD-10-CM

## 2025-08-18 DIAGNOSIS — J30.2 SEASONAL ALLERGIES: ICD-10-CM

## 2025-08-19 RX ORDER — FERROUS SULFATE 325(65) MG
TABLET ORAL
Qty: 90 TABLET | Refills: 0 | Status: SHIPPED | OUTPATIENT
Start: 2025-08-19

## 2025-08-19 RX ORDER — FLUTICASONE PROPIONATE 50 MCG
1 SPRAY, SUSPENSION (ML) NASAL DAILY
Qty: 16 G | Refills: 0 | Status: SHIPPED | OUTPATIENT
Start: 2025-08-19

## 2025-08-20 ENCOUNTER — OFFICE VISIT (OUTPATIENT)
Age: 50
End: 2025-08-20
Payer: MEDICAID

## 2025-08-20 VITALS
TEMPERATURE: 97.9 F | SYSTOLIC BLOOD PRESSURE: 128 MMHG | HEART RATE: 100 BPM | WEIGHT: 189.4 LBS | BODY MASS INDEX: 37.18 KG/M2 | RESPIRATION RATE: 18 BRPM | HEIGHT: 60 IN | DIASTOLIC BLOOD PRESSURE: 76 MMHG | OXYGEN SATURATION: 98 %

## 2025-08-20 DIAGNOSIS — J30.2 SEASONAL ALLERGIES: ICD-10-CM

## 2025-08-20 DIAGNOSIS — E53.8 B12 DEFICIENCY: ICD-10-CM

## 2025-08-20 DIAGNOSIS — F41.9 ANXIETY: ICD-10-CM

## 2025-08-20 DIAGNOSIS — J43.8 OTHER EMPHYSEMA (HCC): ICD-10-CM

## 2025-08-20 DIAGNOSIS — G57.52 TARSAL TUNNEL SYNDROME OF LEFT SIDE: Primary | ICD-10-CM

## 2025-08-20 PROCEDURE — 99214 OFFICE O/P EST MOD 30 MIN: CPT | Performed by: FAMILY MEDICINE

## 2025-08-20 RX ORDER — BUPROPION HYDROCHLORIDE 300 MG/1
300 TABLET ORAL EVERY MORNING
Qty: 30 TABLET | Refills: 3 | Status: SHIPPED | OUTPATIENT
Start: 2025-08-20

## 2025-08-20 RX ORDER — MORPHINE SULFATE 30 MG/1
TABLET, FILM COATED, EXTENDED RELEASE ORAL
COMMUNITY
Start: 2025-08-01

## 2025-08-20 RX ORDER — CYANOCOBALAMIN 1000 UG/ML
1000 INJECTION, SOLUTION INTRAMUSCULAR; SUBCUTANEOUS ONCE
Status: COMPLETED | OUTPATIENT
Start: 2025-08-20 | End: 2025-08-20

## 2025-08-20 RX ORDER — CLONAZEPAM 0.5 MG/1
TABLET ORAL
Qty: 45 TABLET | Refills: 2 | Status: SHIPPED | OUTPATIENT
Start: 2025-08-20 | End: 2025-11-21

## 2025-08-20 RX ORDER — EPINEPHRINE 0.3 MG/.3ML
INJECTION SUBCUTANEOUS
Qty: 1 EACH | Refills: 3 | Status: SHIPPED | OUTPATIENT
Start: 2025-08-20

## 2025-08-20 RX ORDER — TRIAMCINOLONE ACETONIDE 0.25 MG/G
CREAM TOPICAL
Qty: 80 G | Refills: 1 | Status: SHIPPED | OUTPATIENT
Start: 2025-08-20

## 2025-08-20 RX ADMIN — CYANOCOBALAMIN 1000 MCG: 1000 INJECTION, SOLUTION INTRAMUSCULAR; SUBCUTANEOUS at 16:06

## 2025-08-20 ASSESSMENT — ENCOUNTER SYMPTOMS
TROUBLE SWALLOWING: 0
ABDOMINAL PAIN: 0
COLOR CHANGE: 0
EYE PAIN: 0
SHORTNESS OF BREATH: 0
EYE REDNESS: 0
SORE THROAT: 0
CHEST TIGHTNESS: 0
VOMITING: 0
COUGH: 0
DIARRHEA: 0
NAUSEA: 0
BACK PAIN: 1